# Patient Record
Sex: FEMALE | Race: BLACK OR AFRICAN AMERICAN | NOT HISPANIC OR LATINO | URBAN - METROPOLITAN AREA
[De-identification: names, ages, dates, MRNs, and addresses within clinical notes are randomized per-mention and may not be internally consistent; named-entity substitution may affect disease eponyms.]

---

## 2017-07-17 ENCOUNTER — INPATIENT (INPATIENT)
Facility: HOSPITAL | Age: 82
LOS: 4 days | Discharge: HOME | End: 2017-07-22
Attending: INTERNAL MEDICINE | Admitting: INTERNAL MEDICINE
Payer: MEDICARE

## 2017-07-17 VITALS
SYSTOLIC BLOOD PRESSURE: 148 MMHG | TEMPERATURE: 98 F | HEIGHT: 62 IN | HEART RATE: 50 BPM | OXYGEN SATURATION: 99 % | WEIGHT: 175.93 LBS | DIASTOLIC BLOOD PRESSURE: 64 MMHG | RESPIRATION RATE: 18 BRPM

## 2017-07-17 DIAGNOSIS — Z90.49 ACQUIRED ABSENCE OF OTHER SPECIFIED PARTS OF DIGESTIVE TRACT: Chronic | ICD-10-CM

## 2017-07-17 DIAGNOSIS — Z85.038 PERSONAL HISTORY OF OTHER MALIGNANT NEOPLASM OF LARGE INTESTINE: ICD-10-CM

## 2017-07-17 DIAGNOSIS — K56.60 UNSPECIFIED INTESTINAL OBSTRUCTION: ICD-10-CM

## 2017-07-17 DIAGNOSIS — I10 ESSENTIAL (PRIMARY) HYPERTENSION: ICD-10-CM

## 2017-07-17 DIAGNOSIS — R42 DIZZINESS AND GIDDINESS: ICD-10-CM

## 2017-07-17 DIAGNOSIS — Z90.49 ACQUIRED ABSENCE OF OTHER SPECIFIED PARTS OF DIGESTIVE TRACT: ICD-10-CM

## 2017-07-17 PROCEDURE — 99285 EMERGENCY DEPT VISIT HI MDM: CPT

## 2017-07-17 NOTE — ED ADULT TRIAGE NOTE - CHIEF COMPLAINT QUOTE
c/o dizziness x few days with generalized abdominal pain today + nausea denies vomiting or diarrhea states "room spinning sensation" worse upon supine position denies fever/chills denies headache

## 2017-07-18 DIAGNOSIS — N39.0 URINARY TRACT INFECTION, SITE NOT SPECIFIED: ICD-10-CM

## 2017-07-18 DIAGNOSIS — K56.69 OTHER INTESTINAL OBSTRUCTION: ICD-10-CM

## 2017-07-18 DIAGNOSIS — R42 DIZZINESS AND GIDDINESS: ICD-10-CM

## 2017-07-18 DIAGNOSIS — R74.8 ABNORMAL LEVELS OF OTHER SERUM ENZYMES: ICD-10-CM

## 2017-07-18 DIAGNOSIS — Z90.49 ACQUIRED ABSENCE OF OTHER SPECIFIED PARTS OF DIGESTIVE TRACT: Chronic | ICD-10-CM

## 2017-07-18 DIAGNOSIS — I10 ESSENTIAL (PRIMARY) HYPERTENSION: ICD-10-CM

## 2017-07-18 LAB
ALBUMIN SERPL ELPH-MCNC: 3.7 G/DL — SIGNIFICANT CHANGE UP (ref 3.3–5)
ALP SERPL-CCNC: 65 U/L — SIGNIFICANT CHANGE UP (ref 40–120)
ALT FLD-CCNC: 24 U/L — SIGNIFICANT CHANGE UP (ref 12–78)
ANION GAP SERPL CALC-SCNC: 7 MMOL/L — SIGNIFICANT CHANGE UP (ref 5–17)
ANION GAP SERPL CALC-SCNC: 7 MMOL/L — SIGNIFICANT CHANGE UP (ref 5–17)
APPEARANCE UR: CLEAR — SIGNIFICANT CHANGE UP
AST SERPL-CCNC: 13 U/L — LOW (ref 15–37)
BACTERIA # UR AUTO: ABNORMAL
BASOPHILS # BLD AUTO: 0 K/UL — SIGNIFICANT CHANGE UP (ref 0–0.2)
BASOPHILS # BLD AUTO: 0.1 K/UL — SIGNIFICANT CHANGE UP (ref 0–0.2)
BASOPHILS NFR BLD AUTO: 0.5 % — SIGNIFICANT CHANGE UP (ref 0–2)
BASOPHILS NFR BLD AUTO: 1.5 % — SIGNIFICANT CHANGE UP (ref 0–2)
BILIRUB SERPL-MCNC: 0.8 MG/DL — SIGNIFICANT CHANGE UP (ref 0.2–1.2)
BILIRUB UR-MCNC: NEGATIVE — SIGNIFICANT CHANGE UP
BUN SERPL-MCNC: 14 MG/DL — SIGNIFICANT CHANGE UP (ref 7–23)
BUN SERPL-MCNC: 17 MG/DL — SIGNIFICANT CHANGE UP (ref 7–23)
CALCIUM SERPL-MCNC: 10 MG/DL — SIGNIFICANT CHANGE UP (ref 8.5–10.1)
CALCIUM SERPL-MCNC: 9.8 MG/DL — SIGNIFICANT CHANGE UP (ref 8.5–10.1)
CHLORIDE SERPL-SCNC: 110 MMOL/L — HIGH (ref 96–108)
CHLORIDE SERPL-SCNC: 111 MMOL/L — HIGH (ref 96–108)
CHOLEST SERPL-MCNC: 177 MG/DL — SIGNIFICANT CHANGE UP (ref 10–199)
CK SERPL-CCNC: 133 U/L — SIGNIFICANT CHANGE UP (ref 26–192)
CO2 SERPL-SCNC: 26 MMOL/L — SIGNIFICANT CHANGE UP (ref 22–31)
CO2 SERPL-SCNC: 27 MMOL/L — SIGNIFICANT CHANGE UP (ref 22–31)
COLOR SPEC: YELLOW — SIGNIFICANT CHANGE UP
CREAT SERPL-MCNC: 1.17 MG/DL — SIGNIFICANT CHANGE UP (ref 0.5–1.3)
CREAT SERPL-MCNC: 1.29 MG/DL — SIGNIFICANT CHANGE UP (ref 0.5–1.3)
DIFF PNL FLD: ABNORMAL
EOSINOPHIL # BLD AUTO: 0 K/UL — SIGNIFICANT CHANGE UP (ref 0–0.5)
EOSINOPHIL # BLD AUTO: 0.1 K/UL — SIGNIFICANT CHANGE UP (ref 0–0.5)
EOSINOPHIL NFR BLD AUTO: 0.7 % — SIGNIFICANT CHANGE UP (ref 0–6)
EOSINOPHIL NFR BLD AUTO: 0.9 % — SIGNIFICANT CHANGE UP (ref 0–6)
GLUCOSE SERPL-MCNC: 110 MG/DL — HIGH (ref 70–99)
GLUCOSE SERPL-MCNC: 129 MG/DL — HIGH (ref 70–99)
GLUCOSE UR QL: NEGATIVE MG/DL — SIGNIFICANT CHANGE UP
HCT VFR BLD CALC: 34.4 % — LOW (ref 34.5–45)
HCT VFR BLD CALC: 36 % — SIGNIFICANT CHANGE UP (ref 34.5–45)
HDLC SERPL-MCNC: 31 MG/DL — LOW (ref 40–125)
HGB BLD-MCNC: 12.6 G/DL — SIGNIFICANT CHANGE UP (ref 11.5–15.5)
HGB BLD-MCNC: 12.7 G/DL — SIGNIFICANT CHANGE UP (ref 11.5–15.5)
KETONES UR-MCNC: NEGATIVE — SIGNIFICANT CHANGE UP
LACTATE SERPL-SCNC: 0.9 MMOL/L — SIGNIFICANT CHANGE UP (ref 0.7–2)
LEUKOCYTE ESTERASE UR-ACNC: ABNORMAL
LIDOCAIN IGE QN: 122 U/L — SIGNIFICANT CHANGE UP (ref 73–393)
LIPID PNL WITH DIRECT LDL SERPL: 109 MG/DL — SIGNIFICANT CHANGE UP
LYMPHOCYTES # BLD AUTO: 1.2 K/UL — SIGNIFICANT CHANGE UP (ref 1–3.3)
LYMPHOCYTES # BLD AUTO: 1.3 K/UL — SIGNIFICANT CHANGE UP (ref 1–3.3)
LYMPHOCYTES # BLD AUTO: 16.3 % — SIGNIFICANT CHANGE UP (ref 13–44)
LYMPHOCYTES # BLD AUTO: 19.9 % — SIGNIFICANT CHANGE UP (ref 13–44)
MCHC RBC-ENTMCNC: 31.3 PG — SIGNIFICANT CHANGE UP (ref 27–34)
MCHC RBC-ENTMCNC: 32.4 PG — SIGNIFICANT CHANGE UP (ref 27–34)
MCHC RBC-ENTMCNC: 35.2 GM/DL — SIGNIFICANT CHANGE UP (ref 32–36)
MCHC RBC-ENTMCNC: 36.6 GM/DL — HIGH (ref 32–36)
MCV RBC AUTO: 88.5 FL — SIGNIFICANT CHANGE UP (ref 80–100)
MCV RBC AUTO: 88.9 FL — SIGNIFICANT CHANGE UP (ref 80–100)
MONOCYTES # BLD AUTO: 0.4 K/UL — SIGNIFICANT CHANGE UP (ref 0–0.9)
MONOCYTES # BLD AUTO: 0.5 K/UL — SIGNIFICANT CHANGE UP (ref 0–0.9)
MONOCYTES NFR BLD AUTO: 6.2 % — SIGNIFICANT CHANGE UP (ref 2–14)
MONOCYTES NFR BLD AUTO: 7.7 % — SIGNIFICANT CHANGE UP (ref 2–14)
NEUTROPHILS # BLD AUTO: 4.5 K/UL — SIGNIFICANT CHANGE UP (ref 1.8–7.4)
NEUTROPHILS # BLD AUTO: 5.5 K/UL — SIGNIFICANT CHANGE UP (ref 1.8–7.4)
NEUTROPHILS NFR BLD AUTO: 70.2 % — SIGNIFICANT CHANGE UP (ref 43–77)
NEUTROPHILS NFR BLD AUTO: 76 % — SIGNIFICANT CHANGE UP (ref 43–77)
NITRITE UR-MCNC: NEGATIVE — SIGNIFICANT CHANGE UP
PH UR: 6 — SIGNIFICANT CHANGE UP (ref 5–8)
PLATELET # BLD AUTO: 289 K/UL — SIGNIFICANT CHANGE UP (ref 150–400)
PLATELET # BLD AUTO: 292 K/UL — SIGNIFICANT CHANGE UP (ref 150–400)
POTASSIUM SERPL-MCNC: 4.3 MMOL/L — SIGNIFICANT CHANGE UP (ref 3.5–5.3)
POTASSIUM SERPL-MCNC: 4.7 MMOL/L — SIGNIFICANT CHANGE UP (ref 3.5–5.3)
POTASSIUM SERPL-SCNC: 4.3 MMOL/L — SIGNIFICANT CHANGE UP (ref 3.5–5.3)
POTASSIUM SERPL-SCNC: 4.7 MMOL/L — SIGNIFICANT CHANGE UP (ref 3.5–5.3)
PROT SERPL-MCNC: 7.5 GM/DL — SIGNIFICANT CHANGE UP (ref 6–8.3)
PROT UR-MCNC: 30 MG/DL
RBC # BLD: 3.89 M/UL — SIGNIFICANT CHANGE UP (ref 3.8–5.2)
RBC # BLD: 4.05 M/UL — SIGNIFICANT CHANGE UP (ref 3.8–5.2)
RBC # FLD: 13 % — SIGNIFICANT CHANGE UP (ref 11–15)
RBC # FLD: 13 % — SIGNIFICANT CHANGE UP (ref 11–15)
RBC CASTS # UR COMP ASSIST: SIGNIFICANT CHANGE UP /HPF (ref 0–4)
SODIUM SERPL-SCNC: 144 MMOL/L — SIGNIFICANT CHANGE UP (ref 135–145)
SODIUM SERPL-SCNC: 144 MMOL/L — SIGNIFICANT CHANGE UP (ref 135–145)
SP GR SPEC: 1.01 — SIGNIFICANT CHANGE UP (ref 1.01–1.02)
T3 SERPL-MCNC: 104 NG/DL — SIGNIFICANT CHANGE UP (ref 80–200)
T4 AB SER-ACNC: 6.6 UG/DL — SIGNIFICANT CHANGE UP (ref 4.6–12)
TOTAL CHOLESTEROL/HDL RATIO MEASUREMENT: 5.7 RATIO — SIGNIFICANT CHANGE UP (ref 3.3–7.1)
TRIGL SERPL-MCNC: 185 MG/DL — HIGH (ref 10–149)
TROPONIN I SERPL-MCNC: 0.09 NG/ML — HIGH (ref 0.01–0.04)
TROPONIN I SERPL-MCNC: 0.1 NG/ML — HIGH (ref 0.01–0.04)
TROPONIN I SERPL-MCNC: 0.1 NG/ML — HIGH (ref 0.01–0.04)
TSH SERPL-MCNC: 2.24 UU/ML — SIGNIFICANT CHANGE UP (ref 0.36–3.74)
UROBILINOGEN FLD QL: NEGATIVE MG/DL — SIGNIFICANT CHANGE UP
WBC # BLD: 6.5 K/UL — SIGNIFICANT CHANGE UP (ref 3.8–10.5)
WBC # BLD: 7.2 K/UL — SIGNIFICANT CHANGE UP (ref 3.8–10.5)
WBC # FLD AUTO: 6.5 K/UL — SIGNIFICANT CHANGE UP (ref 3.8–10.5)
WBC # FLD AUTO: 7.2 K/UL — SIGNIFICANT CHANGE UP (ref 3.8–10.5)
WBC UR QL: ABNORMAL

## 2017-07-18 PROCEDURE — 71010: CPT | Mod: 26,77

## 2017-07-18 PROCEDURE — 70450 CT HEAD/BRAIN W/O DYE: CPT | Mod: 26

## 2017-07-18 PROCEDURE — 93010 ELECTROCARDIOGRAM REPORT: CPT

## 2017-07-18 PROCEDURE — 99223 1ST HOSP IP/OBS HIGH 75: CPT

## 2017-07-18 PROCEDURE — 43752 NASAL/OROGASTRIC W/TUBE PLMT: CPT

## 2017-07-18 PROCEDURE — 71010: CPT | Mod: 26

## 2017-07-18 PROCEDURE — 74176 CT ABD & PELVIS W/O CONTRAST: CPT | Mod: 26

## 2017-07-18 RX ORDER — SODIUM CHLORIDE 9 MG/ML
1000 INJECTION INTRAMUSCULAR; INTRAVENOUS; SUBCUTANEOUS ONCE
Qty: 0 | Refills: 0 | Status: COMPLETED | OUTPATIENT
Start: 2017-07-18 | End: 2017-07-18

## 2017-07-18 RX ORDER — MORPHINE SULFATE 50 MG/1
2 CAPSULE, EXTENDED RELEASE ORAL EVERY 6 HOURS
Qty: 0 | Refills: 0 | Status: DISCONTINUED | OUTPATIENT
Start: 2017-07-18 | End: 2017-07-22

## 2017-07-18 RX ORDER — ONDANSETRON 8 MG/1
4 TABLET, FILM COATED ORAL EVERY 6 HOURS
Qty: 0 | Refills: 0 | Status: DISCONTINUED | OUTPATIENT
Start: 2017-07-18 | End: 2017-07-22

## 2017-07-18 RX ORDER — AMLODIPINE BESYLATE 2.5 MG/1
10 TABLET ORAL DAILY
Qty: 0 | Refills: 0 | Status: DISCONTINUED | OUTPATIENT
Start: 2017-07-18 | End: 2017-07-22

## 2017-07-18 RX ORDER — MECLIZINE HCL 12.5 MG
25 TABLET ORAL ONCE
Qty: 0 | Refills: 0 | Status: COMPLETED | OUTPATIENT
Start: 2017-07-18 | End: 2017-07-18

## 2017-07-18 RX ORDER — SODIUM CHLORIDE 9 MG/ML
1000 INJECTION INTRAMUSCULAR; INTRAVENOUS; SUBCUTANEOUS
Qty: 0 | Refills: 0 | Status: DISCONTINUED | OUTPATIENT
Start: 2017-07-18 | End: 2017-07-22

## 2017-07-18 RX ORDER — HEPARIN SODIUM 5000 [USP'U]/ML
5000 INJECTION INTRAVENOUS; SUBCUTANEOUS EVERY 12 HOURS
Qty: 0 | Refills: 0 | Status: DISCONTINUED | OUTPATIENT
Start: 2017-07-18 | End: 2017-07-22

## 2017-07-18 RX ORDER — CEFTRIAXONE 500 MG/1
1 INJECTION, POWDER, FOR SOLUTION INTRAMUSCULAR; INTRAVENOUS ONCE
Qty: 0 | Refills: 0 | Status: COMPLETED | OUTPATIENT
Start: 2017-07-18 | End: 2017-07-18

## 2017-07-18 RX ORDER — PIPERACILLIN AND TAZOBACTAM 4; .5 G/20ML; G/20ML
3.38 INJECTION, POWDER, LYOPHILIZED, FOR SOLUTION INTRAVENOUS ONCE
Qty: 0 | Refills: 0 | Status: COMPLETED | OUTPATIENT
Start: 2017-07-18 | End: 2017-07-18

## 2017-07-18 RX ORDER — CEFTRIAXONE 500 MG/1
1 INJECTION, POWDER, FOR SOLUTION INTRAMUSCULAR; INTRAVENOUS EVERY 24 HOURS
Qty: 0 | Refills: 0 | Status: DISCONTINUED | OUTPATIENT
Start: 2017-07-19 | End: 2017-07-18

## 2017-07-18 RX ORDER — ASPIRIN/CALCIUM CARB/MAGNESIUM 324 MG
325 TABLET ORAL ONCE
Qty: 0 | Refills: 0 | Status: COMPLETED | OUTPATIENT
Start: 2017-07-18 | End: 2017-07-18

## 2017-07-18 RX ORDER — ATENOLOL 25 MG/1
25 TABLET ORAL DAILY
Qty: 0 | Refills: 0 | Status: DISCONTINUED | OUTPATIENT
Start: 2017-07-18 | End: 2017-07-22

## 2017-07-18 RX ORDER — LISINOPRIL 2.5 MG/1
20 TABLET ORAL DAILY
Qty: 0 | Refills: 0 | Status: DISCONTINUED | OUTPATIENT
Start: 2017-07-18 | End: 2017-07-22

## 2017-07-18 RX ORDER — PIPERACILLIN AND TAZOBACTAM 4; .5 G/20ML; G/20ML
3.38 INJECTION, POWDER, LYOPHILIZED, FOR SOLUTION INTRAVENOUS EVERY 8 HOURS
Qty: 0 | Refills: 0 | Status: DISCONTINUED | OUTPATIENT
Start: 2017-07-18 | End: 2017-07-18

## 2017-07-18 RX ADMIN — Medication 25 MILLIGRAM(S): at 02:10

## 2017-07-18 RX ADMIN — MORPHINE SULFATE 2 MILLIGRAM(S): 50 CAPSULE, EXTENDED RELEASE ORAL at 23:18

## 2017-07-18 RX ADMIN — SODIUM CHLORIDE 100 MILLILITER(S): 9 INJECTION INTRAMUSCULAR; INTRAVENOUS; SUBCUTANEOUS at 08:54

## 2017-07-18 RX ADMIN — MORPHINE SULFATE 2 MILLIGRAM(S): 50 CAPSULE, EXTENDED RELEASE ORAL at 09:46

## 2017-07-18 RX ADMIN — ATENOLOL 25 MILLIGRAM(S): 25 TABLET ORAL at 09:41

## 2017-07-18 RX ADMIN — CEFTRIAXONE 100 GRAM(S): 500 INJECTION, POWDER, FOR SOLUTION INTRAMUSCULAR; INTRAVENOUS at 04:03

## 2017-07-18 RX ADMIN — ONDANSETRON 4 MILLIGRAM(S): 8 TABLET, FILM COATED ORAL at 09:57

## 2017-07-18 RX ADMIN — SODIUM CHLORIDE 500 MILLILITER(S): 9 INJECTION INTRAMUSCULAR; INTRAVENOUS; SUBCUTANEOUS at 02:09

## 2017-07-18 RX ADMIN — MORPHINE SULFATE 2 MILLIGRAM(S): 50 CAPSULE, EXTENDED RELEASE ORAL at 10:14

## 2017-07-18 RX ADMIN — PIPERACILLIN AND TAZOBACTAM 200 GRAM(S): 4; .5 INJECTION, POWDER, LYOPHILIZED, FOR SOLUTION INTRAVENOUS at 08:39

## 2017-07-18 RX ADMIN — Medication 325 MILLIGRAM(S): at 04:29

## 2017-07-18 NOTE — ED PROVIDER NOTE - PHYSICAL EXAMINATION
GEN: Alert, NAD  HEAD: atraumatic, EOMI, PERRL, normal lids/conjunctiva  ENT: normal hearing, patent oropharynx without erythema/exudate, uvula midline  NECK: +supple, no tenderness/meningismus, +Trachea midline  PULM: Bilateral BS, normal resp effort, no wheeze/stridor/retractions  CV: RRR, no M/R/G, +dist ext pulses  ABD: soft, NT/ND  BACK: no CVAT, no midline tenderness  MSK: no edema/erythema/cyanosis  SKIN: no rash  NEURO: AAOx3, no sensory/motor deficits, CN 2-12 intact  neg pepper mcnair darlenee b/l  sotero nagel b/l

## 2017-07-18 NOTE — H&P ADULT - ASSESSMENT
82 y/o woman with history colon resection for Ca, presents with diffuse abdominal pain with abdominal distention; no nausea or vomiting, she has been passing stools. CT shows at least partial  SBO. She had an episode of vertigo, head CT shows mastoid effusion. Also noted is a UTI and elevated troponin. She denies chest pain or SOB.

## 2017-07-18 NOTE — H&P ADULT - NSHPPHYSICALEXAM_GEN_ALL_CORE
T(C): 36.7 (18 Jul 2017 04:50), Max: 36.7 (17 Jul 2017 22:00)  T(F): 98 (18 Jul 2017 04:50), Max: 98.1 (17 Jul 2017 22:00)  HR: 77 (18 Jul 2017 04:50) (50 - 77)  BP: 148/69 (18 Jul 2017 04:50) (148/64 - 154/77)  BP(mean): --  RR: 17 (18 Jul 2017 04:50) (17 - 20)  SpO2: 99% (18 Jul 2017 04:50) (97% - 99%)     PHYSICAL EXAM:  GENERAL: NAD, well-groomed, well-developed  HEAD:  Atraumatic, Normocephalic  EYES: EOMI, PERRLA, conjunctiva and sclera clear  ENMT: No tonsillar erythema, exudates, or enlargement; No lesions  NECK: Supple, No JVD  NERVOUS SYSTEM:  Alert & Oriented X3, CN 2-12 intact; Motor Strength 5/5 B/L upper and lower extremities; DTRs 2+ intact and symmetric  CHEST/LUNG: Clear to percussion bilaterally; No rales, rhonchi, wheezing, or rubs  HEART: Regular rate and rhythm; No murmurs, rubs, or gallops  ABDOMEN: Soft, Nontender, no rebound or guarding, mildly distended; Bowel sounds present  EXTREMITIES: + Peripheral Pulses, No clubbing, cyanosis, or edema  LYMPH: No lymphadenopathy noted  SKIN: No rashes or lesions

## 2017-07-18 NOTE — ED PROVIDER NOTE - CARE PLAN
Principal Discharge DX:	NSTEMI (non-ST elevated myocardial infarction)  Secondary Diagnosis:	Urinary tract infection without hematuria, site unspecified  Secondary Diagnosis:	Abdominal pain, unspecified location

## 2017-07-18 NOTE — PROGRESS NOTE ADULT - SUBJECTIVE AND OBJECTIVE BOX
Patient is a 81y old  Female who presents with a chief complaint of Abdominal pain for several days. (2017 06:41)      INTERVAL HPI/OVERNIGHT EVENTS: admitted in AM, had some ep of vomiting     MEDICATIONS  (STANDING):  ATENolol  Tablet 25 milliGRAM(s) Oral daily  heparin  Injectable 5000 Unit(s) SubCutaneous every 12 hours  sodium chloride 0.9%. 1000 milliLiter(s) (100 mL/Hr) IV Continuous <Continuous>  amLODIPine   Tablet 10 milliGRAM(s) Oral daily  lisinopril 20 milliGRAM(s) Oral daily  cefTRIAXone   IVPB 1 Gram(s) IV Intermittent every 24 hours    MEDICATIONS  (PRN):  morphine  - Injectable 2 milliGRAM(s) IV Push every 6 hours PRN Severe Pain (7 - 10)  ondansetron Injectable 4 milliGRAM(s) IV Push every 6 hours PRN Nausea and/or Vomiting      Allergies    No Known Allergies    Intolerances      REVIEW OF SYSTEMS:  CONSTITUTIONAL: No fever, weight loss, or fatigue  EYES: No eye pain, visual disturbances, or discharge  ENMT:  No difficulty hearing, + vertigo; No sinus or throat pain  NECK: No pain or stiffness  BREASTS: No pain, masses, or nipple discharge  RESPIRATORY: No cough, wheezing, chills or hemoptysis; No shortness of breath  CARDIOVASCULAR: No chest pain, palpitations, dizziness, or leg swelling  GASTROINTESTINAL: + abdominal  pain. No nausea, vomiting, or hematemesis; No diarrhea or constipation. No melena or hematochezia.  GENITOURINARY: No dysuria, frequency, hematuria, or incontinence  NEUROLOGICAL: No headaches, memory loss, loss of strength, numbness, or tremors  SKIN: No itching, burning, rashes, or lesions   LYMPH NODES: No enlarged glands  ENDOCRINE: No heat or cold intolerance; No hair loss  MUSCULOSKELETAL: No joint pain or swelling; No muscle, back, or extremity pain  PSYCHIATRIC: No depression, anxiety, mood swings, or difficulty sleeping  HEME/LYMPH: No easy bruising, or bleeding gums  ALLERGY AND IMMUNOLOGIC: No hives or eczema    Vital Signs Last 24 Hrs  T(C): 36.9 (2017 09:35), Max: 36.9 (2017 09:35)  T(F): 98.5 (2017 09:35), Max: 98.5 (2017 09:35)  HR: 56 (2017 11:08) (50 - 77)  BP: 152/55 (2017 11:08) (148/64 - 182/69)  BP(mean): --  RR: 18 (2017 11:08) (15 - 20)  SpO2: 97% (2017 11:08) (96% - 99%)    PHYSICAL EXAM:  GENERAL: NAD, well-groomed, well-developed  HEAD:  Atraumatic, Normocephalic  EYES: EOMI, PERRLA, conjunctiva and sclera clear  ENMT: No tonsillar erythema, exudates, or enlargement; No lesions  NECK: Supple, No JVD  NERVOUS SYSTEM:  Alert & Oriented X3, CN 2-12 intact; Motor Strength 5/5 B/L upper and lower extremities; DTRs 2+ intact and symmetric  CHEST/LUNG: Clear to percussion bilaterally; No rales, rhonchi, wheezing, or rubs  HEART: Regular rate and rhythm; No murmurs, rubs, or gallops  ABDOMEN: Soft, Nontender, no rebound or guarding, mildly distended; Bowel sounds present  EXTREMITIES: + Peripheral Pulses, No clubbing, cyanosis, or edema  LYMPH: No lymphadenopathy noted  SKIN: No rashes or lesions    LABS:                        12.7   6.5   )-----------( 289      ( 2017 09:47 )             36.0     07-18    144  |  111<H>  |  14  ----------------------------<  110<H>  4.3   |  26  |  1.17    Ca    9.8      2017 09:47    TPro  7.5  /  Alb  3.7  /  TBili  0.8  /  DBili  x   /  AST  13<L>  /  ALT  24  /  AlkPhos  65  07-18      Urinalysis Basic - ( 2017 02:20 )    Color: Yellow / Appearance: Clear / S.010 / pH: x  Gluc: x / Ketone: Negative  / Bili: Negative / Urobili: Negative mg/dL   Blood: x / Protein: 30 mg/dL / Nitrite: Negative   Leuk Esterase: Moderate / RBC: 0-2 /HPF / WBC 11-25   Sq Epi: x / Non Sq Epi: x / Bacteria: x      CAPILLARY BLOOD GLUCOSE          RADIOLOGY & ADDITIONAL TESTS:    Imaging Personally Reviewed:  [ ] YES  [ ] NO    Consultant(s) Notes Reviewed:  [ ] YES  [ ] NO    Care Discussed with Consultants/Other Providers [ ] YES  [ ] NO

## 2017-07-18 NOTE — H&P ADULT - NSHPLABSRESULTS_GEN_ALL_CORE
LABS:                        12.6   7.2   )-----------( 292      ( 2017 02:09 )             34.4     -    144  |  110<H>  |  17  ----------------------------<  129<H>  4.7   |  27  |  1.29    Ca    10.0      2017 02:09    TPro  7.5  /  Alb  3.7  /  TBili  0.8  /  DBili  x   /  AST  13<L>  /  ALT  24  /  AlkPhos  65        Urinalysis Basic - ( 2017 02:20 )    Color: Yellow / Appearance: Clear / S.010 / pH: x  Gluc: x / Ketone: Negative  / Bili: Negative / Urobili: Negative mg/dL   Blood: x / Protein: 30 mg/dL / Nitrite: Negative   Leuk Esterase: Moderate / RBC: 0-2 /HPF / WBC 11-25   Sq Epi: x / Non Sq Epi: x / Bacteria: x      CAPILLARY BLOOD GLUCOSE      RADIOLOGY & ADDITIONAL TESTS:   CT Abdomen and Pelvis No Cont (17 @ 04:02) >  Small bowel obstruction, at least partial, transition point right lower   quadrant. No secondary signs of ischemia at this time.     CT Head No Cont (17 @ 04:01) >  No acute intracranial hemorrhage, mass effect or evidence of acute   territorial infarct. Mild to moderate chronic microvascular change.  There is a right mastoid effusion.    CXR: no infiltrate      Imaging Personally Reviewed:  [ x] YES  [ ] NO    EKG: sinus@85 LVH

## 2017-07-18 NOTE — H&P ADULT - NSHPREVIEWOFSYSTEMS_GEN_ALL_CORE
REVIEW OF SYSTEMS:  CONSTITUTIONAL: No fever, weight loss, or fatigue  EYES: No eye pain, visual disturbances, or discharge  ENMT:  No difficulty hearing, + vertigo; No sinus or throat pain  NECK: No pain or stiffness  BREASTS: No pain, masses, or nipple discharge  RESPIRATORY: No cough, wheezing, chills or hemoptysis; No shortness of breath  CARDIOVASCULAR: No chest pain, palpitations, dizziness, or leg swelling  GASTROINTESTINAL: + abdominal  pain. No nausea, vomiting, or hematemesis; No diarrhea or constipation. No melena or hematochezia.  GENITOURINARY: No dysuria, frequency, hematuria, or incontinence  NEUROLOGICAL: No headaches, memory loss, loss of strength, numbness, or tremors  SKIN: No itching, burning, rashes, or lesions   LYMPH NODES: No enlarged glands  ENDOCRINE: No heat or cold intolerance; No hair loss  MUSCULOSKELETAL: No joint pain or swelling; No muscle, back, or extremity pain  PSYCHIATRIC: No depression, anxiety, mood swings, or difficulty sleeping  HEME/LYMPH: No easy bruising, or bleeding gums  ALLERGY AND IMMUNOLOGIC: No hives or eczema

## 2017-07-18 NOTE — CONSULT NOTE ADULT - PROBLEM SELECTOR RECOMMENDATION 3
Poorly controlled. Resume beta blocker, calcium channel blocker and ACE inhibitor. Continue to monitor.

## 2017-07-18 NOTE — ED PROVIDER NOTE - PRESENTING SYMPTOMS DETAILS
81F w colon ca s/p resection remotely, htn comes ni w gen abd danielle and lightheadedness. states abd danielle started today, feels bloating, no n/v/d. no weaknes/snumbness. also fenrández shad wks worth of room spinning sensation worse laying down. no headache, currently vertiginous sx resolved  ROS: No fever/chills, No photophobia/eye pain/changes in vision, No ear pain/sore throat/dysphagia, No chest pain/palpitations, no SOB/cough/wheeze/stridor, No N/V/D, no dysuria/frequency/discharge, No neck/back pain, no rash, no changes in neurological status/function.

## 2017-07-18 NOTE — ED PROVIDER NOTE - MEDICAL DECISION MAKING DETAILS
ct shows partial sbo although clnically abd exam benign, no n/v/d, passing flatus. surg consulted. pt admitted for nstemi, sbo

## 2017-07-18 NOTE — CONSULT NOTE ADULT - SUBJECTIVE AND OBJECTIVE BOX
Dr. Quentin Nava contact information [phone]    GENERAL SURGERY CONSULT NOTE    Patient is a 81y old  Female who presents with a chief complaint of Abdominal pain for several days. (2017 06:41)      HPI:  80 y/o woman PMH colon ca s/p resection remotely, HTN comes in with generalized abdominal pain and lightheadedness.  She states has had abd pain on and off for several days, crampy, diffuse, mod to mild intensity, feels bloating, but no  n/v/d; she has been passing stools. Denies chest pain, SOB, cough, weakness/numbness. Denies headache, had episode vertiginous sx which has now resolved.  No fever/chills, No photophobia/eye pain/changes in vision, No ear pain/sore throat/dysphagia, No chest pain/palpitations, no SOB/cough/wheeze/stridor, No N/V/D, no dysuria/frequency/discharge, No neck/back pain, no rash, no changes in neurological status/function. (2017 06:41)      PAST MEDICAL & SURGICAL HISTORY:  Colon cancer  HTN (hypertension)  History of cholecystectomy  S/P colon resection      Review of Systems:    I have reviewed 9 systems with the patient and the only positive findings were     MEDICATIONS  (STANDING):  ATENolol  Tablet 25 milliGRAM(s) Oral daily  heparin  Injectable 5000 Unit(s) SubCutaneous every 12 hours  sodium chloride 0.9%. 1000 milliLiter(s) (100 mL/Hr) IV Continuous <Continuous>  piperacillin/tazobactam IVPB. 3.375 Gram(s) IV Intermittent every 8 hours    MEDICATIONS  (PRN):  morphine  - Injectable 2 milliGRAM(s) IV Push every 6 hours PRN Severe Pain (7 - 10)  ondansetron Injectable 4 milliGRAM(s) IV Push every 6 hours PRN Nausea and/or Vomiting      Allergies    No Known Allergies    Intolerances      FAMILY HISTORY:  No pertinent family history in first degree relatives      Vital Signs Last 24 Hrs  T(C): 36.8 (2017 07:50), Max: 36.8 (2017 07:50)  T(F): 98.2 (2017 07:50), Max: 98.2 (2017 07:50)  HR: 57 (2017 08:45) (50 - 77)  BP: 169/74 (2017 08:45) (148/64 - 169/74)  BP(mean): --  RR: 16 (2017 08:45) (15 - 20)  SpO2: 96% (2017 08:45) (96% - 99%)    I&O's Detail    2017 07:01  -  2017 09:04  --------------------------------------------------------  IN:    Solution: 100 mL  Total IN: 100 mL    OUT:  Total OUT: 0 mL    Total NET: 100 mL          Physical Exam:    General:  Appears stated age, well-nourished, no distress - Creole speaking   Abdomen:  Softly disstended with mild tenderness    Neuro/Psych:  Alert, oriented to time, place and person       LABS:                        12.6   7.2   )-----------( 292      ( 2017 02:09 )             34.4     07-18    144  |  110<H>  |  17  ----------------------------<  129<H>  4.7   |  27  |  1.29    Ca    10.0      2017 02:09    TPro  7.5  /  Alb  3.7  /  TBili  0.8  /  DBili  x   /  AST  13<L>  /  ALT  24  /  AlkPhos  65  07-18      Urinalysis Basic - ( 2017 02:20 )    Color: Yellow / Appearance: Clear / S.010 / pH: x  Gluc: x / Ketone: Negative  / Bili: Negative / Urobili: Negative mg/dL   Blood: x / Protein: 30 mg/dL / Nitrite: Negative   Leuk Esterase: Moderate / RBC: 0-2 /HPF / WBC 11-25   Sq Epi: x / Non Sq Epi: x / Bacteria: x        RADIOLOGY & ADDITIONAL STUDIES:  Dr. Quentin Nava contact information [phone]    GENERAL SURGERY CONSULT NOTE    Patient is a 81y old  Female who presents with a chief complaint of Abdominal pain for several days. (2017 06:41)      HPI:  80 y/o woman PMH colon ca s/p resection remotely, HTN comes in with generalized abdominal pain and lightheadedness.  She states has had abd pain on and off for several days, crampy, diffuse, mod to mild intensity, feels bloating, but no  n/v/d; she has been passing stools. Denies chest pain, SOB, cough, weakness/numbness. Denies headache, had episode vertiginous sx which has now resolved.  No fever/chills, No photophobia/eye pain/changes in vision, No ear pain/sore throat/dysphagia, No chest pain/palpitations, no SOB/cough/wheeze/stridor, No N/V/D, no dysuria/frequency/discharge, No neck/back pain, no rash, no changes in neurological status/function. (2017 06:41)      PAST MEDICAL & SURGICAL HISTORY:  Colon cancer  HTN (hypertension)  History of cholecystectomy  S/P colon resection      Review of Systems:    I have reviewed 9 systems with the patient and the only positive findings were     MEDICATIONS  (STANDING):  ATENolol  Tablet 25 milliGRAM(s) Oral daily  heparin  Injectable 5000 Unit(s) SubCutaneous every 12 hours  sodium chloride 0.9%. 1000 milliLiter(s) (100 mL/Hr) IV Continuous <Continuous>  piperacillin/tazobactam IVPB. 3.375 Gram(s) IV Intermittent every 8 hours    MEDICATIONS  (PRN):  morphine  - Injectable 2 milliGRAM(s) IV Push every 6 hours PRN Severe Pain (7 - 10)  ondansetron Injectable 4 milliGRAM(s) IV Push every 6 hours PRN Nausea and/or Vomiting      Allergies    No Known Allergies    Intolerances        SOCIAL HISTORY          Smoking: Yes [ ]  No [ ]   ______pk yrs          ETOH  Yes [ ]  No [ ]  Social [ ]          DRUGS:  Yes [ ]  No [ ]  if so what______________    FAMILY HISTORY:  No pertinent family history in first degree relatives      Vital Signs Last 24 Hrs  T(C): 36.8 (2017 07:50), Max: 36.8 (2017 07:50)  T(F): 98.2 (2017 07:50), Max: 98.2 (2017 07:50)  HR: 57 (2017 08:45) (50 - 77)  BP: 169/74 (2017 08:45) (148/64 - 169/74)  BP(mean): --  RR: 16 (2017 08:45) (15 - 20)  SpO2: 96% (2017 08:45) (96% - 99%)    I&O's Detail    2017 07:01  -  2017 09:04  --------------------------------------------------------  IN:    Solution: 100 mL  Total IN: 100 mL    OUT:  Total OUT: 0 mL    Total NET: 100 mL          Physical Exam:    General:  Appears stated age, well-groomed, well-nourished, no distress - Creole speaking    Abdomen:  softly distended - with mild tenderness but no peritoneal findings    Neuro/Psych:  Alert, oriented tp time, place and person       LABS:                        12.6   7.2   )-----------( 292      ( 2017 02:09 )             34.4     07-18    144  |  110<H>  |  17  ----------------------------<  129<H>  4.7   |  27  |  1.29    Ca    10.0      2017 02:09    TPro  7.5  /  Alb  3.7  /  TBili  0.8  /  DBili  x   /  AST  13<L>  /  ALT  24  /  AlkPhos  65  07-18      Urinalysis Basic - ( 2017 02:20 )    Color: Yellow / Appearance: Clear / S.010 / pH: x  Gluc: x / Ketone: Negative  / Bili: Negative / Urobili: Negative mg/dL   Blood: x / Protein: 30 mg/dL / Nitrite: Negative   Leuk Esterase: Moderate / RBC: 0-2 /HPF / WBC 11-25   Sq Epi: x / Non Sq Epi: x / Bacteria: x        RADIOLOGY & ADDITIONAL STUDIES:    < from: CT Abdomen and Pelvis No Cont (17 @ 04:02) >  IMPRESSION:    Small bowel obstruction, at least partial, transition point right lower   quadrant. No secondary signs of ischemia at this time.    < end of copied text >
Infectious Diseases - Attending at Dr. Little    HPI:  80 y/o woman PMH colon ca s/p resection remotely, HTN comes in with generalized abdominal pain and lightheadedness.  She states has had abd pain on and off for several days, crampy, diffuse, mod to mild intensity, feels bloating, but no  n/v/d; she has been passing stools. Denies chest pain, SOB, cough, weakness/numbness. Denies headache, had episode vertiginous sx which has now resolved.  No fever/chills, No photophobia/eye pain/changes in vision, No ear pain/sore throat/dysphagia, No chest pain/palpitations, no SOB/cough/wheeze/stridor, No N/V/D, no dysuria/frequency/discharge, No neck/back pain, no rash, no changes in neurological status/function. (2017 06:41)  Pt;s family creole speaking helped during conversation    PAST MEDICAL & SURGICAL HISTORY:  Colon cancer  HTN (hypertension)  History of cholecystectomy  S/P colon resection      Allergies    No Known Allergies    Intolerances        FAMILY HISTORY:  No pertinent family history in first degree relatives      SOCIAL HISTORYno smoking    REVIEW OF SYSTEMS:    Constitutional: No fever, weight loss or fatigue  Eyes: No eye pain, visual disturbances, or discharge  ENT:  No difficulty hearing, tinnitus, vertigo; No sinus or throat pain  Neck: No pain or stiffness  Respiratory: No cough, wheezing, chills or hemoptysis  Cardiovascular: No chest pain, palpitations, shortness of breath, dizziness or leg swelling  Gastrointestinal:+ abdominal pain and vomiting  Genitourinary: No dysuria, frequency, hematuria or incontinence  Neurological: No headaches, memory loss, loss of strength, numbness or tremors  Skin: No itching, burning, rashes or lesions       MEDICATIONS  (STANDING):  ATENolol  Tablet 25 milliGRAM(s) Oral daily  heparin  Injectable 5000 Unit(s) SubCutaneous every 12 hours  sodium chloride 0.9%. 1000 milliLiter(s) (100 mL/Hr) IV Continuous <Continuous>  amLODIPine   Tablet 10 milliGRAM(s) Oral daily  lisinopril 20 milliGRAM(s) Oral daily    MEDICATIONS  (PRN):  morphine  - Injectable 2 milliGRAM(s) IV Push every 6 hours PRN Severe Pain (7 - 10)  ondansetron Injectable 4 milliGRAM(s) IV Push every 6 hours PRN Nausea and/or Vomiting      Vital Signs Last 24 Hrs  Tmax:afebrile  HR: 62 (2017 05:27) (53 - 62)  BP: 147/69 (2017 05:27) (138/67 - 147/69)  BP(mean): --  RR: 18 (2017 05:27) (18 - 18)  SpO2: 93% (2017 05:27) (93% - 98%)    PHYSICAL EXAM:    Constitutional:appears lethargic, well-groomed, well-developed  HEENT: PERRLA, EOMI, Normal Hearing, MMM, NG tube present  Neck: No LAD, No JVD  Back: Normal spine flexure, No CVA tenderness  Respiratory: CTAB/L  Cardiovascular: S1 and S2, RRR, no M/G/R  Gastrointestinal: abdominal pain present+, NGT +  Extremities: No peripheral edema  Vascular: 2+ peripheral pulses  Neurological: A/O x 3, no focal deficits  Skin: No rashes      LABS:             WBC 6.5  cr 1.17  Trop 0.96-->0.96-->0.75      Urinalysis Basic - ( 2017 02:20 )    Color: Yellow / Appearance: Clear / S.010 / pH: x  Gluc: x / Ketone: Negative  / Bili: Negative / Urobili: Negative mg/dL   Blood: x / Protein: 30 mg/dL / Nitrite: Negative   Leuk Esterase: Moderate / RBC: 0-2 /HPF / WBC 11-25   Sq Epi: x / Non Sq Epi: x / Bacteria: x        MICROBIOLOGY:  RECENT CULTURES:        RADIOLOGY & ADDITIONAL STUDIES:  < from: CT Abdomen and Pelvis No Cont (17 @ 04:02) >  IMPRESSION:    Small bowel obstruction, at least partial, transition point right lower   quadrant. No secondary signs of ischemia at this time.    < end of copied text >
CARDIOLOGY CONSULT NOTE    Patient is a Creole speaking 81y Female with a stated history of colon ca s/p resection remotely, chronic HTN.   Admitted with c/o generalized abdominal pain on and off for several days, crampy, diffuse, mod to mild intensity, feels bloating, but no overt n/v/d, normal BM's. Denied chest pain, SOB, cough, weakness/numbness. Denied headache, but had single episode of lightheadedness which appears to have resolved. No fever/chills, No photophobia/eye pain/changes in vision, No ear pain/sore throat/dysphagia, No dysuria/frequency/discharge, No neck/back pain, no rash, no changes in neurological status/function.       REVIEW OF SYSTEMS: Not available, patient spoke poor English  MEDICATIONS  (STANDING):  ATENolol  Tablet 25 milliGRAM(s) Oral daily  Amlodipine-Benazapril      ALLERGIES: No Known Allergies      FAMILY HISTORY:  No pertinent family history in first degree relatives      PHYSICAL EXAMINATION:  -----------------------------  T(C): 36.9 (07-18-17 @ 09:35), Max: 36.9 (07-18-17 @ 09:35)  HR: 59 (07-18-17 @ 09:35) (50 - 77)  BP: 182/69 (07-18-17 @ 09:35) (148/64 - 182/69)  RR: 16 (07-18-17 @ 09:35) (15 - 20)  SpO2: 97% (07-18-17 @ 09:35) (96% - 99%)  Wt(kg): --    07-18 @ 07:01  -  07-18 @ 09:39  --------------------------------------------------------  IN:    Solution: 100 mL  Total IN: 100 mL    OUT:  Total OUT: 0 mL    Total NET: 100 mL        Height (cm): 157.48 (07-17 @ 22:00)  Weight (kg): 79.8 (07-17 @ 22:00)  BMI (kg/m2): 32.2 (07-17 @ 22:00)  BSA (m2): 1.81 (07-17 @ 22:00)    Constitutional: well developed, normal appearance, well groomed, well nourished, no deformities and no acute distress.   Eyes: the conjunctiva exhibited no abnormalities and the eyelids demonstrated no xanthelasmas.   HEENT: normal oral mucosa, no oral pallor and no oral cyanosis.   Neck: normal jugular venous A waves present, normal jugular venous V waves present and no jugular venous sauer A waves.   Pulmonary: no respiratory distress, normal respiratory rhythm and effort, no accessory muscle use and lungs were clear to auscultation bilaterally.   Cardiovascular: heart rate and rhythm were normal, normal S1 and S2 and no murmur, gallop, rub, heave or thrill are present.   Abdomen: soft, non-tender, no hepato-splenomegaly and no abdominal mass palpated.   Musculoskeletal: the gait could not be assessed..   Extremities: no clubbing of the fingernails, no localized cyanosis, no petechial hemorrhages and no ischemic changes.   Skin: normal skin color and pigmentation, no rash, no venous stasis, no skin lesions, no skin ulcer and no xanthoma was observed.   Psychiatric: oriented to person, place, and time, the affect was normal, the mood was normal and not feeling anxious.     LABS:   --------  07-18    144  |  110<H>  |  17  ----------------------------<  129<H>  4.7   |  27  |  1.29    Ca    10.0      18 Jul 2017 02:09    TPro  7.5  /  Alb  3.7  /  TBili  0.8  /  DBili  x   /  AST  13<L>  /  ALT  24  /  AlkPhos  65  07-18                         12.6   7.2   )-----------( 292      ( 18 Jul 2017 02:09 )             34.4         07-18 @ 02:09 CPK total:--, CKMB --, Troponin I - .096 ng/mL<H>          RADIOLOGY:  -----------------    < from: Xray Chest 1 View AP/PA. (07.18.17 @ 02:41) >    EXAM:  CHEST SINGLE VIEW                            PROCEDURE DATE:  07/18/2017          INTERPRETATION:  PROCEDURE: AP view of the chest.    CLINICAL INFORMATION: Chest pain    There is no prior radiograph available for comparison at the time of this   report.    FINDINGS:        There are no focal air space opacities. The cardiac and mediastinal   contours are prominent, which may be due to magnification from AP   technique and shallow inspiration. The osseous structures are intact.    IMPRESSION:    No focal air space opacities.                  CANDI MERIDA M.D., ATTENDING RADIOLOGIST  This document has been electronically signed. Jul 18 2017  8:26AM                < end of copied text >    < from: CT Abdomen and Pelvis No Cont (07.18.17 @ 04:02) >    EXAM:  CT ABDOMEN AND PELVIS                            PROCEDURE DATE:  07/18/2017          INTERPRETATION:  CLINICAL HISTORY: Generalized abdominal pain.    TECHNIQUE: CT of the abdomen and pelvis is performed with axial 3.75mm   images without the administration of IV contrast.  Oral contrast was   withheld as per the referring clinician's request. Sagittal and coronal   reformations are provided.     COMPARISON: CT of the abdomen and pelvis from 8/23/2016    FINDINGS:   The heart is mildly enlarged. The lung bases are clear.    Evaluation of the solid organs and vasculature is limited without the   administration of intravenous contrast. Lack of oral contrast limits   evaluation of the bowel for certain disease processes.    The unenhanced appearance of the liver,  spleen, pancreas, adrenal   glands, and kidneys is unremarkable. Gallbladder is surgically removed.    There are mildly distended loops of small bowel which appear to have a   single transition point in the right lower quadrant to collapsed distal   small bowel. There is no free air or ascites. There is no abnormal bowel   wall thickening. Patient is status post appendectomy.    There is no abdominal or pelvic lymphadenopathy. The abdominal aorta is   normal in caliber.    The urinary bladder is underdistended which sends evaluation of the wall   thickness. There are no pelvic soft tissue masses.    There are no acute osseous abnormalities.    IMPRESSION:    Small bowel obstruction, at least partial, transition point right lower   quadrant. No secondary signs of ischemia at this time.                DANISHA LAWRENCE M.D., RADIOLOGIST  This document has been electronically signed. Jul 18 2017  4:30AM                < end of copied text >    ECG: NSR+APC's, LVH with repol abn

## 2017-07-18 NOTE — CHART NOTE - NSCHARTNOTEFT_GEN_A_CORE
asked to place NGT for SBO    salem NGT placed  without difficulty  + output  +placement by Auscultation     CXR for confirmation

## 2017-07-18 NOTE — CONSULT NOTE ADULT - ASSESSMENT
In the emergency room the patient was found to have a STEMI and is admitted to telemetry  I reviewed the CT scan- the stomach is moderately large. I have suggested that the patient have an NG tube placed to decompress the stomach.  We will follow with you.

## 2017-07-18 NOTE — H&P ADULT - HISTORY OF PRESENT ILLNESS
80 y/o woman PMH colon ca s/p resection remotely, HTN comes in with generalized abdominal pain and lightheadedness.  She states has had abd pain on and off for several days, crampy, diffuse, mod to mild intensity, feels bloating, but no  n/v/d; she has been passing stools. Denies chest pain, SOB, cough, weakness/numbness. Denies headache, had episode vertiginous sx which has now resolved.  No fever/chills, No photophobia/eye pain/changes in vision, No ear pain/sore throat/dysphagia, No chest pain/palpitations, no SOB/cough/wheeze/stridor, No N/V/D, no dysuria/frequency/discharge, No neck/back pain, no rash, no changes in neurological status/function.

## 2017-07-18 NOTE — CONSULT NOTE ADULT - PROBLEM SELECTOR RECOMMENDATION 2
Non-pathognomonic for myocardial infarction at this time. Patient not having any chest pain at present. Further risk stratification pending. Serial cardiac enzymes to be followed, serial EKG, echo pending. Continue beta blocker. Hold aspirin due to possible need for surgical intervention if SBO worsens.

## 2017-07-18 NOTE — ED ADULT NURSE NOTE - OBJECTIVE STATEMENT
pt brought in by daughter with c/o dizziness  for few days  , with abd. pain  started today , denies vomiting or diarrhea but nausea , not in any distress, denies chest pain a s well

## 2017-07-18 NOTE — PROGRESS NOTE ADULT - PROBLEM SELECTOR PLAN 3
cardio consult appreciated, trend trops, ekg does not reflect acute event. no active chest pain. monitor tele, tte,

## 2017-07-19 DIAGNOSIS — R82.90 UNSPECIFIED ABNORMAL FINDINGS IN URINE: ICD-10-CM

## 2017-07-19 DIAGNOSIS — R10.9 UNSPECIFIED ABDOMINAL PAIN: ICD-10-CM

## 2017-07-19 LAB
ANION GAP SERPL CALC-SCNC: 6 MMOL/L — SIGNIFICANT CHANGE UP (ref 5–17)
BASOPHILS # BLD AUTO: 0.1 K/UL — SIGNIFICANT CHANGE UP (ref 0–0.2)
BASOPHILS NFR BLD AUTO: 1.1 % — SIGNIFICANT CHANGE UP (ref 0–2)
BUN SERPL-MCNC: 11 MG/DL — SIGNIFICANT CHANGE UP (ref 7–23)
CALCIUM SERPL-MCNC: 8.9 MG/DL — SIGNIFICANT CHANGE UP (ref 8.5–10.1)
CHLORIDE SERPL-SCNC: 115 MMOL/L — HIGH (ref 96–108)
CO2 SERPL-SCNC: 25 MMOL/L — SIGNIFICANT CHANGE UP (ref 22–31)
CREAT SERPL-MCNC: 1.09 MG/DL — SIGNIFICANT CHANGE UP (ref 0.5–1.3)
CULTURE RESULTS: SIGNIFICANT CHANGE UP
EOSINOPHIL # BLD AUTO: 0.1 K/UL — SIGNIFICANT CHANGE UP (ref 0–0.5)
EOSINOPHIL NFR BLD AUTO: 2.5 % — SIGNIFICANT CHANGE UP (ref 0–6)
GLUCOSE SERPL-MCNC: 100 MG/DL — HIGH (ref 70–99)
HCT VFR BLD CALC: 29.6 % — LOW (ref 34.5–45)
HGB BLD-MCNC: 10.6 G/DL — LOW (ref 11.5–15.5)
LYMPHOCYTES # BLD AUTO: 1 K/UL — SIGNIFICANT CHANGE UP (ref 1–3.3)
LYMPHOCYTES # BLD AUTO: 20.8 % — SIGNIFICANT CHANGE UP (ref 13–44)
MAGNESIUM SERPL-MCNC: 2 MG/DL — SIGNIFICANT CHANGE UP (ref 1.6–2.6)
MCHC RBC-ENTMCNC: 31.1 PG — SIGNIFICANT CHANGE UP (ref 27–34)
MCHC RBC-ENTMCNC: 35.7 GM/DL — SIGNIFICANT CHANGE UP (ref 32–36)
MCV RBC AUTO: 87.2 FL — SIGNIFICANT CHANGE UP (ref 80–100)
MONOCYTES # BLD AUTO: 0.4 K/UL — SIGNIFICANT CHANGE UP (ref 0–0.9)
MONOCYTES NFR BLD AUTO: 8.9 % — SIGNIFICANT CHANGE UP (ref 2–14)
NEUTROPHILS # BLD AUTO: 3.2 K/UL — SIGNIFICANT CHANGE UP (ref 1.8–7.4)
NEUTROPHILS NFR BLD AUTO: 66.7 % — SIGNIFICANT CHANGE UP (ref 43–77)
PHOSPHATE SERPL-MCNC: 3.1 MG/DL — SIGNIFICANT CHANGE UP (ref 2.5–4.5)
PLATELET # BLD AUTO: 253 K/UL — SIGNIFICANT CHANGE UP (ref 150–400)
POTASSIUM SERPL-MCNC: 4.1 MMOL/L — SIGNIFICANT CHANGE UP (ref 3.5–5.3)
POTASSIUM SERPL-SCNC: 4.1 MMOL/L — SIGNIFICANT CHANGE UP (ref 3.5–5.3)
RBC # BLD: 3.4 M/UL — LOW (ref 3.8–5.2)
RBC # FLD: 13.2 % — SIGNIFICANT CHANGE UP (ref 11–15)
SODIUM SERPL-SCNC: 146 MMOL/L — HIGH (ref 135–145)
SPECIMEN SOURCE: SIGNIFICANT CHANGE UP
TROPONIN I SERPL-MCNC: 0.07 NG/ML — HIGH (ref 0.01–0.04)
WBC # BLD: 4.8 K/UL — SIGNIFICANT CHANGE UP (ref 3.8–10.5)
WBC # FLD AUTO: 4.8 K/UL — SIGNIFICANT CHANGE UP (ref 3.8–10.5)

## 2017-07-19 PROCEDURE — 74020: CPT | Mod: 26

## 2017-07-19 PROCEDURE — 93306 TTE W/DOPPLER COMPLETE: CPT | Mod: 26

## 2017-07-19 PROCEDURE — 99232 SBSQ HOSP IP/OBS MODERATE 35: CPT

## 2017-07-19 PROCEDURE — 99233 SBSQ HOSP IP/OBS HIGH 50: CPT

## 2017-07-19 RX ORDER — CEFTRIAXONE 500 MG/1
1 INJECTION, POWDER, FOR SOLUTION INTRAMUSCULAR; INTRAVENOUS EVERY 24 HOURS
Qty: 0 | Refills: 0 | Status: DISCONTINUED | OUTPATIENT
Start: 2017-07-20 | End: 2017-07-20

## 2017-07-19 RX ORDER — CEFTRIAXONE 500 MG/1
INJECTION, POWDER, FOR SOLUTION INTRAMUSCULAR; INTRAVENOUS
Qty: 0 | Refills: 0 | Status: DISCONTINUED | OUTPATIENT
Start: 2017-07-19 | End: 2017-07-20

## 2017-07-19 RX ORDER — CEFTRIAXONE 500 MG/1
1 INJECTION, POWDER, FOR SOLUTION INTRAMUSCULAR; INTRAVENOUS ONCE
Qty: 0 | Refills: 0 | Status: COMPLETED | OUTPATIENT
Start: 2017-07-19 | End: 2017-07-19

## 2017-07-19 RX ADMIN — HEPARIN SODIUM 5000 UNIT(S): 5000 INJECTION INTRAVENOUS; SUBCUTANEOUS at 17:39

## 2017-07-19 RX ADMIN — LISINOPRIL 20 MILLIGRAM(S): 2.5 TABLET ORAL at 06:37

## 2017-07-19 RX ADMIN — CEFTRIAXONE 100 GRAM(S): 500 INJECTION, POWDER, FOR SOLUTION INTRAMUSCULAR; INTRAVENOUS at 15:09

## 2017-07-19 RX ADMIN — MORPHINE SULFATE 2 MILLIGRAM(S): 50 CAPSULE, EXTENDED RELEASE ORAL at 00:15

## 2017-07-19 RX ADMIN — ATENOLOL 25 MILLIGRAM(S): 25 TABLET ORAL at 06:37

## 2017-07-19 RX ADMIN — HEPARIN SODIUM 5000 UNIT(S): 5000 INJECTION INTRAVENOUS; SUBCUTANEOUS at 06:39

## 2017-07-19 RX ADMIN — SODIUM CHLORIDE 100 MILLILITER(S): 9 INJECTION INTRAMUSCULAR; INTRAVENOUS; SUBCUTANEOUS at 06:39

## 2017-07-19 RX ADMIN — AMLODIPINE BESYLATE 10 MILLIGRAM(S): 2.5 TABLET ORAL at 06:37

## 2017-07-19 NOTE — PROGRESS NOTE ADULT - SUBJECTIVE AND OBJECTIVE BOX
Patient is a 81y old  Female who presents with a chief complaint of Abdominal pain for several days. (2017 06:41)      INTERVAL HPI/OVERNIGHT EVENTS:no events, no bm/gas    MEDICATIONS  (STANDING):  ATENolol  Tablet 25 milliGRAM(s) Oral daily  heparin  Injectable 5000 Unit(s) SubCutaneous every 12 hours  sodium chloride 0.9%. 1000 milliLiter(s) (100 mL/Hr) IV Continuous <Continuous>  amLODIPine   Tablet 10 milliGRAM(s) Oral daily  lisinopril 20 milliGRAM(s) Oral daily  cefTRIAXone   IVPB      cefTRIAXone   IVPB 1 Gram(s) IV Intermittent once    MEDICATIONS  (PRN):  morphine  - Injectable 2 milliGRAM(s) IV Push every 6 hours PRN Severe Pain (7 - 10)  ondansetron Injectable 4 milliGRAM(s) IV Push every 6 hours PRN Nausea and/or Vomiting      Allergies    No Known Allergies    Intolerances        REVIEW OF SYSTEMS:  CONSTITUTIONAL: No fever, weight loss, or fatigue  EYES: No eye pain, visual disturbances, or discharge  ENMT:  No difficulty hearing, + vertigo; No sinus or throat pain  NECK: No pain or stiffness  BREASTS: No pain, masses, or nipple discharge  RESPIRATORY: No cough, wheezing, chills or hemoptysis; No shortness of breath  CARDIOVASCULAR: No chest pain, palpitations, dizziness, or leg swelling  GASTROINTESTINAL: + abdominal  pain. No nausea, vomiting, or hematemesis; No diarrhea or constipation. No melena or hematochezia.  GENITOURINARY: No dysuria, frequency, hematuria, or incontinence  NEUROLOGICAL: No headaches, memory loss, loss of strength, numbness, or tremors  SKIN: No itching, burning, rashes, or lesions   Vital Signs Last 24 Hrs  T(C): 37.1 (2017 12:18), Max: 37.1 (2017 00:54)  T(F): 98.8 (2017 12:18), Max: 98.8 (2017 00:54)  HR: 57 (2017 12:18) (53 - 62)  BP: 139/58 (2017 12:18) (138/67 - 147/69)  BP(mean): --  RR: 18 (2017 12:18) (18 - 18)  SpO2: 95% (2017 12:18) (93% - 98%)    PHYSICAL EXAM:  GENERAL: NAD, well-groomed, well-developed  HEAD:  Atraumatic, Normocephalic  EYES: EOMI, PERRLA, conjunctiva and sclera clear  ENMT: No tonsillar erythema, exudates, or enlargement; No lesions  NECK: Supple, No JVD  NERVOUS SYSTEM:  Alert & Oriented X3, CN 2-12 intact; Motor Strength 5/5 B/L upper and lower extremities; DTRs 2+ intact and symmetric  CHEST/LUNG: Clear to percussion bilaterally; No rales, rhonchi, wheezing, or rubs  HEART: Regular rate and rhythm; No murmurs, rubs, or gallops  ABDOMEN: Soft, Nontender, no rebound or guarding, mildly distended; Bowel sounds present  EXTREMITIES: + Peripheral Pulses, No clubbing, cyanosis, or edema  LYMPH: No lymphadenopathy noted  SKIN: No rashes or lesions    LABS:                        10.6   4.8   )-----------( 253      ( 2017 04:11 )             29.6     07-    146<H>  |  115<H>  |  11  ----------------------------<  100<H>  4.1   |  25  |  1.09    Ca    8.9      2017 03:59  Phos  3.1     -  Mg     2.0     -    TPro  7.5  /  Alb  3.7  /  TBili  0.8  /  DBili  x   /  AST  13<L>  /  ALT  24  /  AlkPhos  65  07-18      Urinalysis Basic - ( 2017 02:20 )    Color: Yellow / Appearance: Clear / S.010 / pH: x  Gluc: x / Ketone: Negative  / Bili: Negative / Urobili: Negative mg/dL   Blood: x / Protein: 30 mg/dL / Nitrite: Negative   Leuk Esterase: Moderate / RBC: 0-2 /HPF / WBC 11-25   Sq Epi: x / Non Sq Epi: x / Bacteria: x      CAPILLARY BLOOD GLUCOSE          RADIOLOGY & ADDITIONAL TESTS:    Imaging Personally Reviewed:  [ ] YES  [ ] NO    Consultant(s) Notes Reviewed:  [ ] YES  [ ] NO    Care Discussed with Consultants/Other Providers [ ] YES  [ ] NO

## 2017-07-19 NOTE — PROGRESS NOTE ADULT - ASSESSMENT
80 y/o woman with history colon resection for Ca, presents with diffuse abdominal pain with abdominal distention; no nausea or vomiting, she has been passing stools. CT shows at least partial  SBO. She had an episode of vertigo, head CT shows mastoid effusion. Also noted is a UTI and elevated troponin. She denies chest pain or SOB.

## 2017-07-19 NOTE — PROGRESS NOTE ADULT - PROBLEM SELECTOR PLAN 1
NPO, IV fluids, Ng tube , pain control , surgery following Surgery f/u  NPO, NGT to suction  monitor electrolytes  IV hydration, pain control  No evidence of ischemia noted on CAT scan.

## 2017-07-19 NOTE — PROGRESS NOTE ADULT - SUBJECTIVE AND OBJECTIVE BOX
Patient is a 81y old  Female who presents with a chief complaint of Abdominal pain for several days. (18 Jul 2017 06:41)      PAST MEDICAL & SURGICAL HISTORY:  Colon cancer  HTN (hypertension)  History of cholecystectomy  S/P colon resection      INTERVAL HISTORY: Resting in bed, not in any acute distress  	  MEDICATIONS:  MEDICATIONS  (STANDING):  ATENolol  Tablet 25 milliGRAM(s) Oral daily  heparin  Injectable 5000 Unit(s) SubCutaneous every 12 hours  sodium chloride 0.9%. 1000 milliLiter(s) (100 mL/Hr) IV Continuous <Continuous>  amLODIPine   Tablet 10 milliGRAM(s) Oral daily  lisinopril 20 milliGRAM(s) Oral daily    MEDICATIONS  (PRN):  morphine  - Injectable 2 milliGRAM(s) IV Push every 6 hours PRN Severe Pain (7 - 10)  ondansetron Injectable 4 milliGRAM(s) IV Push every 6 hours PRN Nausea and/or Vomiting      Vitals:  T(F): 98.8 (07-19-17 @ 05:27), Max: 98.8 (07-19-17 @ 00:54)  HR: 62 (07-19-17 @ 05:27) (53 - 62)  BP: 147/69 (07-19-17 @ 05:27) (138/67 - 147/69)  RR: 18 (07-19-17 @ 05:27) (18 - 18)  SpO2: 93% (07-19-17 @ 05:27) (93% - 98%)    07-18 @ 07:01  -  07-19 @ 07:00  --------------------------------------------------------  IN:    sodium chloride 0.9%.: 1200 mL    Solution: 100 mL  Total IN: 1300 mL    OUT:    Voided: 700 mL  Total OUT: 700 mL    Total NET: 600 mL      Weight (kg): 79.8 (07-18 @ 11:08)  BMI (kg/m2): 32.2 (07-18 @ 11:08)      PHYSICAL EXAM:  Neuro: Awake, responsive  CV: S1 S2 RRR  Lungs: CTABL  GI: Soft, BS +, ND, NT  Extremities: No edema    TELEMETRY: RSR  	      RADIOLOGY: < from: Xray Chest 1 View AP/PA. (07.18.17 @ 02:41) >  There are no focal air space opacities. The cardiac and mediastinal   contours are prominent, which may be due to magnification from AP   technique and shallow inspiration. The osseous structures are intact.    IMPRESSION:    No focal air space opacities.    < end of copied text >      < from: Xray Abdomen Multiple Views (07.19.17 @ 09:36) >  FINDINGS AND   IMPRESSION: There is an enteric tube with the tip in the stomach.   Surgical clips are noted in the right hemiabdomen. There is a nonspecific   bowel gas pattern with a few prominent air-filled loops of small bowel in   the midabdomen. No gross free air identified.    < end of copied text >    < from: CT Abdomen and Pelvis No Cont (07.18.17 @ 04:02) >  mall bowel obstruction, at least partial, transition point right lower   quadrant. No secondary signs of ischemia at this time.    < end of copied text >        DIAGNOSTIC TESTING:    [P ] Echocardiogram:    LABS:	 	    CARDIAC MARKERS:  Troponin I, Serum: .075 ng/mL (07-19 @ 03:59)  Troponin I, Serum: .089 ng/mL (07-18 @ 18:13)  Troponin I, Serum: .096 ng/mL (07-18 @ 09:47)  Troponin I, Serum: .096 ng/mL (07-18 @ 02:09)          19 Jul 2017 03:59    146    |  115    |  11     ----------------------------<  100    4.1     |  25     |  1.09   18 Jul 2017 09:47    144    |  111    |  14     ----------------------------<  110    4.3     |  26     |  1.17   18 Jul 2017 02:09    144    |  110    |  17     ----------------------------<  129    4.7     |  27     |  1.29     Ca    8.9        19 Jul 2017 03:59  Phos  3.1       19 Jul 2017 03:59  Mg     2.0       19 Jul 2017 03:59    TPro  7.5    /  Alb  3.7    /  TBili  0.8    /  DBili  x      /  AST  13     /  ALT  24     /  AlkPhos  65     18 Jul 2017 02:09                          10.6   4.8   )-----------( 253      ( 19 Jul 2017 04:11 )             29.6 ,                       12.7   6.5   )-----------( 289      ( 18 Jul 2017 09:47 )             36.0 ,                       12.6   7.2   )-----------( 292      ( 18 Jul 2017 02:09 )             34.4     Lipid Profile: 07-18 @ 14:54  HDL/Total Cholesterol: 5.7 RATIO  HDL Chol:              31 mg/dL  Serum Chol:            177 mg/dL  Direct LDL:            109 mg/dL  Triglycerides:         185 mg/dL      TSH: Thyroid Stimulating Hormone, Serum: 2.240 uU/mL (07-18 @ 09:47) Patient is a 81y old  Female who presents with a chief complaint of Abdominal pain for several days. (18 Jul 2017 06:41)      PAST MEDICAL & SURGICAL HISTORY:  Colon cancer  HTN (hypertension)  History of cholecystectomy  S/P colon resection      INTERVAL HISTORY: Resting in bed, not in any acute distress,  denies any sob, chest pain or abdomina pain, has some abdominal discomfort, no c/o nausea or vomiting, NGT to low suction.  	  MEDICATIONS:  MEDICATIONS  (STANDING):  ATENolol  Tablet 25 milliGRAM(s) Oral daily  heparin  Injectable 5000 Unit(s) SubCutaneous every 12 hours  sodium chloride 0.9%. 1000 milliLiter(s) (100 mL/Hr) IV Continuous <Continuous>  amLODIPine   Tablet 10 milliGRAM(s) Oral daily  lisinopril 20 milliGRAM(s) Oral daily    MEDICATIONS  (PRN):  morphine  - Injectable 2 milliGRAM(s) IV Push every 6 hours PRN Severe Pain (7 - 10)  ondansetron Injectable 4 milliGRAM(s) IV Push every 6 hours PRN Nausea and/or Vomiting      Vitals:  T(F): 98.8 (07-19-17 @ 05:27), Max: 98.8 (07-19-17 @ 00:54)  HR: 62 (07-19-17 @ 05:27) (53 - 62)  BP: 147/69 (07-19-17 @ 05:27) (138/67 - 147/69)  RR: 18 (07-19-17 @ 05:27) (18 - 18)  SpO2: 93% (07-19-17 @ 05:27) (93% - 98%)    07-18 @ 07:01  -  07-19 @ 07:00  --------------------------------------------------------  IN:    sodium chloride 0.9%.: 1200 mL    Solution: 100 mL  Total IN: 1300 mL    OUT:    Voided: 700 mL  Total OUT: 700 mL    Total NET: 600 mL      Weight (kg): 79.8 (07-18 @ 11:08)  BMI (kg/m2): 32.2 (07-18 @ 11:08)      PHYSICAL EXAM:  Neuro: Awake, responsive  CV: S1 S2 RRR, 2/6 soft systolic murmur  Lungs: CTABL  GI: Soft, BS +, ND, NT, + NGT  Extremities: No edema    TELEMETRY: RSR  	      RADIOLOGY: < from: Xray Chest 1 View AP/PA. (07.18.17 @ 02:41) >  There are no focal air space opacities. The cardiac and mediastinal   contours are prominent, which may be due to magnification from AP   technique and shallow inspiration. The osseous structures are intact.    IMPRESSION:    No focal air space opacities.    < end of copied text >      < from: Xray Abdomen Multiple Views (07.19.17 @ 09:36) >  FINDINGS AND   IMPRESSION: There is an enteric tube with the tip in the stomach.   Surgical clips are noted in the right hemiabdomen. There is a nonspecific   bowel gas pattern with a few prominent air-filled loops of small bowel in   the midabdomen. No gross free air identified.    < end of copied text >    < from: CT Abdomen and Pelvis No Cont (07.18.17 @ 04:02) >  mall bowel obstruction, at least partial, transition point right lower   quadrant. No secondary signs of ischemia at this time.    < end of copied text >        DIAGNOSTIC TESTING:    [P ] Echocardiogram:    LABS:	 	    CARDIAC MARKERS:  Troponin I, Serum: .075 ng/mL (07-19 @ 03:59)  Troponin I, Serum: .089 ng/mL (07-18 @ 18:13)  Troponin I, Serum: .096 ng/mL (07-18 @ 09:47)  Troponin I, Serum: .096 ng/mL (07-18 @ 02:09)          19 Jul 2017 03:59    146    |  115    |  11     ----------------------------<  100    4.1     |  25     |  1.09   18 Jul 2017 09:47    144    |  111    |  14     ----------------------------<  110    4.3     |  26     |  1.17   18 Jul 2017 02:09    144    |  110    |  17     ----------------------------<  129    4.7     |  27     |  1.29     Ca    8.9        19 Jul 2017 03:59  Phos  3.1       19 Jul 2017 03:59  Mg     2.0       19 Jul 2017 03:59    TPro  7.5    /  Alb  3.7    /  TBili  0.8    /  DBili  x      /  AST  13     /  ALT  24     /  AlkPhos  65     18 Jul 2017 02:09                          10.6   4.8   )-----------( 253      ( 19 Jul 2017 04:11 )             29.6 ,                       12.7   6.5   )-----------( 289      ( 18 Jul 2017 09:47 )             36.0 ,                       12.6   7.2   )-----------( 292      ( 18 Jul 2017 02:09 )             34.4     Lipid Profile: 07-18 @ 14:54  HDL/Total Cholesterol: 5.7 RATIO  HDL Chol:              31 mg/dL  Serum Chol:            177 mg/dL  Direct LDL:            109 mg/dL  Triglycerides:         185 mg/dL      TSH: Thyroid Stimulating Hormone, Serum: 2.240 uU/mL (07-18 @ 09:47)

## 2017-07-19 NOTE — PROGRESS NOTE ADULT - PROBLEM SELECTOR PLAN 1
NPO, IV fluids, Ng tube , pain control , surgery following  pt still did not have bm/gas. Abd xray noted

## 2017-07-19 NOTE — PROGRESS NOTE ADULT - SUBJECTIVE AND OBJECTIVE BOX
SURGERY PROGRESS HPI:  Pt seen and examined at bedside. Denies pain and complaints. States that she is feeling better than yesterday. Pt tolerating NGT. Pt denies nausea and vomiting. No recent BM/flatus. Pt denies chest pain, SOB, dizziness, fever, chills.     Vital Signs Last 24 Hrs  T(C): 37.1 (2017 05:27), Max: 37.1 (2017 00:54)  T(F): 98.8 (2017 05:27), Max: 98.8 (2017 00:54)  HR: 62 (2017 05:27) (53 - 62)  BP: 147/69 (2017 05:27) (138/67 - 182/69)  BP(mean): --  RR: 18 (2017 05:27) (15 - 18)  SpO2: 93% (2017 05:27) (93% - 98%)      PHYSICAL EXAM:    GENERAL: NAD  HEAD:  NGT with 400cc output since insertion yesterday  CHEST/LUNG: Clear to ausculation, bilaterally   HEART: RRR S1S2  ABDOMEN: non distended, minimal BS, soft, non tender, no guarding  EXTREMITIES:  calf soft, non tender b/l    I&O's Detail    2017 07:01  -  2017 06:25  --------------------------------------------------------  IN:    Solution: 100 mL  Total IN: 100 mL    OUT:  Total OUT: 0 mL    Total NET: 100 mL          LABS:                        10.6   4.8   )-----------( 253      ( 2017 04:11 )             29.6     07-19    146<H>  |  115<H>  |  11  ----------------------------<  100<H>  4.1   |  25  |  1.09    Ca    8.9      2017 03:59  Phos  3.1     07-  Mg     2.0     07-    TPro  7.5  /  Alb  3.7  /  TBili  0.8  /  DBili  x   /  AST  13<L>  /  ALT  24  /  AlkPhos  65  07-18      Urinalysis Basic - ( 2017 02:20 )    Color: Yellow / Appearance: Clear / S.010 / pH: x  Gluc: x / Ketone: Negative  / Bili: Negative / Urobili: Negative mg/dL   Blood: x / Protein: 30 mg/dL / Nitrite: Negative   Leuk Esterase: Moderate / RBC: 0-2 /HPF / WBC 11-25   Sq Epi: x / Non Sq Epi: x / Bacteria: x    Assessment: 82 y/o woman with history colon resection for Ca, presents with diffuse abdominal pain with abdominal distention; no nausea or vomiting, she has been passing stools prior to admission. CT shows at least partial SBO. NGT in.    Plan:  -continue NGT to LWS  -monitor bowel function  -pain management prn  -continue DVT prophylaxis, OOB, Ambulating   -f/u labs  -continue medical management  -will discuss pt with surgical attending

## 2017-07-19 NOTE — PROGRESS NOTE ADULT - ASSESSMENT
82 y/o woman with history colon resection for Ca, presents with diffuse abdominal pain with abdominal distention; no nausea or vomiting, she has been passing stools. CT shows at least partial  SBO. She had an episode of vertigo, head CT shows mastoid effusion. Also noted is a UTI and elevated troponin. She denies chest pain or SOB. 80 y/o woman with history colon resection for Ca, presents with diffuse abdominal pain with abdominal distention;  CT shows at least partial SBO. She had an episode of vertigo, head CT shows mastoid effusion. + UTI, mild troponin release.

## 2017-07-19 NOTE — PROGRESS NOTE ADULT - PROBLEM SELECTOR PLAN 3
cardio consult appreciated, trend trops, ekg does not reflect acute event. no active chest pain. monitor tele, tte, Non-pathognomonic for myocardial infarction at this time.   Patient not having any chest pain at present.    Troponin down trending, no acute findings in EKG  Echo pending.   Continue beta blocker.  c/w telemetry

## 2017-07-19 NOTE — PROGRESS NOTE ADULT - PROBLEM SELECTOR PLAN 2
monitor BP, continue medications BP better controlled now  c/w Norvasc and atenolol BP better controlled now  c/w Norvasc, lisinopril, and atenolol

## 2017-07-20 LAB
ANION GAP SERPL CALC-SCNC: 11 MMOL/L — SIGNIFICANT CHANGE UP (ref 5–17)
BUN SERPL-MCNC: 13 MG/DL — SIGNIFICANT CHANGE UP (ref 7–23)
CALCIUM SERPL-MCNC: 9.4 MG/DL — SIGNIFICANT CHANGE UP (ref 8.5–10.1)
CHLORIDE SERPL-SCNC: 115 MMOL/L — HIGH (ref 96–108)
CO2 SERPL-SCNC: 21 MMOL/L — LOW (ref 22–31)
CREAT SERPL-MCNC: 0.93 MG/DL — SIGNIFICANT CHANGE UP (ref 0.5–1.3)
GLUCOSE SERPL-MCNC: 64 MG/DL — LOW (ref 70–99)
HCT VFR BLD CALC: 31 % — LOW (ref 34.5–45)
HGB BLD-MCNC: 10.7 G/DL — LOW (ref 11.5–15.5)
MAGNESIUM SERPL-MCNC: 1.9 MG/DL — SIGNIFICANT CHANGE UP (ref 1.6–2.6)
MCHC RBC-ENTMCNC: 30.6 PG — SIGNIFICANT CHANGE UP (ref 27–34)
MCHC RBC-ENTMCNC: 34.5 GM/DL — SIGNIFICANT CHANGE UP (ref 32–36)
MCV RBC AUTO: 88.8 FL — SIGNIFICANT CHANGE UP (ref 80–100)
PHOSPHATE SERPL-MCNC: 2.6 MG/DL — SIGNIFICANT CHANGE UP (ref 2.5–4.5)
PLATELET # BLD AUTO: 176 K/UL — SIGNIFICANT CHANGE UP (ref 150–400)
POTASSIUM SERPL-MCNC: 4 MMOL/L — SIGNIFICANT CHANGE UP (ref 3.5–5.3)
POTASSIUM SERPL-SCNC: 4 MMOL/L — SIGNIFICANT CHANGE UP (ref 3.5–5.3)
RBC # BLD: 3.49 M/UL — LOW (ref 3.8–5.2)
RBC # FLD: 13 % — SIGNIFICANT CHANGE UP (ref 11–15)
SODIUM SERPL-SCNC: 147 MMOL/L — HIGH (ref 135–145)
WBC # BLD: 5.4 K/UL — SIGNIFICANT CHANGE UP (ref 3.8–10.5)
WBC # FLD AUTO: 5.4 K/UL — SIGNIFICANT CHANGE UP (ref 3.8–10.5)

## 2017-07-20 PROCEDURE — 99233 SBSQ HOSP IP/OBS HIGH 50: CPT

## 2017-07-20 RX ADMIN — AMLODIPINE BESYLATE 10 MILLIGRAM(S): 2.5 TABLET ORAL at 07:00

## 2017-07-20 RX ADMIN — HEPARIN SODIUM 5000 UNIT(S): 5000 INJECTION INTRAVENOUS; SUBCUTANEOUS at 17:36

## 2017-07-20 RX ADMIN — ATENOLOL 25 MILLIGRAM(S): 25 TABLET ORAL at 07:00

## 2017-07-20 RX ADMIN — LISINOPRIL 20 MILLIGRAM(S): 2.5 TABLET ORAL at 07:00

## 2017-07-20 RX ADMIN — SODIUM CHLORIDE 100 MILLILITER(S): 9 INJECTION INTRAMUSCULAR; INTRAVENOUS; SUBCUTANEOUS at 07:00

## 2017-07-20 NOTE — PROGRESS NOTE ADULT - PROBLEM SELECTOR PLAN 1
Surgery f/u  NPO, NGT to suction  monitor electrolytes  IV hydration, pain control  No evidence of ischemia noted on CAT scan. Surgery f/u  NPO  monitor electrolytes  IV hydration, pain control  No evidence of ischemia noted on CAT scan.

## 2017-07-20 NOTE — PROGRESS NOTE ADULT - SUBJECTIVE AND OBJECTIVE BOX
Patient is a 81y old  Female who presents with a chief complaint of Abdominal pain for several days. (18 Jul 2017 06:41)    PAST MEDICAL & SURGICAL HISTORY:  Colon cancer  HTN (hypertension)  History of cholecystectomy  S/P colon resection    INTERVAL HISTORY: resting in bed, not in acute distress, denies any chest pain or SOB  	  MEDICATIONS:  MEDICATIONS  (STANDING):  ATENolol  Tablet 25 milliGRAM(s) Oral daily  heparin  Injectable 5000 Unit(s) SubCutaneous every 12 hours  sodium chloride 0.9%. 1000 milliLiter(s) (100 mL/Hr) IV Continuous <Continuous>  amLODIPine   Tablet 10 milliGRAM(s) Oral daily  lisinopril 20 milliGRAM(s) Oral daily  cefTRIAXone   IVPB      cefTRIAXone   IVPB 1 Gram(s) IV Intermittent every 24 hours    MEDICATIONS  (PRN):  morphine  - Injectable 2 milliGRAM(s) IV Push every 6 hours PRN Severe Pain (7 - 10)  ondansetron Injectable 4 milliGRAM(s) IV Push every 6 hours PRN Nausea and/or Vomiting    Vitals:  T(F): 98.6 (07-20-17 @ 11:02), Max: 98.8 (07-19-17 @ 12:18)  HR: 54 (07-20-17 @ 11:02) (54 - 67)  BP: 135/57 (07-20-17 @ 11:02) (135/57 - 151/64)  RR: 17 (07-20-17 @ 11:02) (17 - 18)  SpO2: 100% (07-20-17 @ 11:02) (94% - 100%)    07-19 @ 07:01  -  07-20 @ 07:00  --------------------------------------------------------  IN:  Total IN: 0 mL    OUT:    Nasoenteral Tube: 400 mL    Voided: 600 mL  Total OUT: 1000 mL    Total NET: -1000 mL    Weight (kg): 79.8 (07-18 @ 11:08)  BMI (kg/m2): 32.2 (07-18 @ 11:08)    PHYSICAL EXAM:  Neuro: Awake, responsive  CV: S1 S2 RRR  Lungs: CTABL  GI: Soft, BS +, ND, NT  Extremities: No edema    TELEMETRY: RSR     RADIOLOGY: < from: Xray Abdomen Multiple Views (07.19.17 @ 09:36) >  IMPRESSION: There is an enteric tube with the tip in the stomach.   Surgical clips are noted in the right hemiabdomen. There is a nonspecific   bowel gas pattern with a few prominent air-filled loops of small bowel in   the midabdomen. Nogross free air identified.    < end of copied text >      DIAGNOSTIC TESTING:    [ x] Echocardiogram:< from: TTE Echo Doppler w/o Cont (07.19.17 @ 15:12) >   1. Left ventricular ejection fraction, by visual estimation, is 60 to   65%.   2. Mild mitral and tricuspid valve regurgitation.   3. Thickening of the anterior and posterior mitral valve leaflets.   4. Mild aortic and pulmonic valve regurgitation.   5. Estimated pulmonary artery systolic pressure is 37.6 mmHg assuming a   right atrial pressure of 5 mmHg, which is consistent with mild pulmonary   hypertension.    < end of copied text >    LABS:	 	    CARDIAC MARKERS:  Troponin I, Serum: .075 ng/mL (07-19 @ 03:59)  Troponin I, Serum: .089 ng/mL (07-18 @ 18:13)  Troponin I, Serum: .096 ng/mL (07-18 @ 09:47)  Troponin I, Serum: .096 ng/mL (07-18 @ 02:09)    20 Jul 2017 07:44    147    |  115    |  13     ----------------------------<  64     4.0     |  21     |  0.93   19 Jul 2017 03:59    146    |  115    |  11     ----------------------------<  100    4.1     |  25     |  1.09   18 Jul 2017 09:47    144    |  111    |  14     ----------------------------<  110    4.3     |  26     |  1.17     Ca    9.4        20 Jul 2017 07:44  Phos  2.6       20 Jul 2017 07:44  Mg     1.9       20 Jul 2017 07:44                            10.7   5.4   )-----------( 176      ( 20 Jul 2017 07:44 )             31.0 ,                       10.6   4.8   )-----------( 253      ( 19 Jul 2017 04:11 )             29.6 ,                       12.7   6.5   )-----------( 289      ( 18 Jul 2017 09:47 )             36.0 ,                       12.6   7.2   )-----------( 292      ( 18 Jul 2017 02:09 )             34.4     Lipid Profile: 07-18 @ 14:54  HDL/Total Cholesterol: 5.7 RATIO  HDL Chol:              31 mg/dL  Serum Chol:            177 mg/dL  Direct LDL:            109 mg/dL  Triglycerides:         185 mg/dL    TSH: Thyroid Stimulating Hormone, Serum: 2.240 uU/mL (07-18 @ 09:47) Patient is a 81y old  Female who presents with a chief complaint of Abdominal pain for several days. (18 Jul 2017 06:41)    PAST MEDICAL & SURGICAL HISTORY:  Colon cancer  HTN (hypertension)  History of cholecystectomy  S/P colon resection    INTERVAL HISTORY: resting in bed, not in acute distress, denies any chest pain or SOB, denies any abdominal pain, nausea or vomiting.   + flatus. 	  MEDICATIONS:  MEDICATIONS  (STANDING):  ATENolol  Tablet 25 milliGRAM(s) Oral daily  heparin  Injectable 5000 Unit(s) SubCutaneous every 12 hours  sodium chloride 0.9%. 1000 milliLiter(s) (100 mL/Hr) IV Continuous <Continuous>  amLODIPine   Tablet 10 milliGRAM(s) Oral daily  lisinopril 20 milliGRAM(s) Oral daily  cefTRIAXone   IVPB      cefTRIAXone   IVPB 1 Gram(s) IV Intermittent every 24 hours    MEDICATIONS  (PRN):  morphine  - Injectable 2 milliGRAM(s) IV Push every 6 hours PRN Severe Pain (7 - 10)  ondansetron Injectable 4 milliGRAM(s) IV Push every 6 hours PRN Nausea and/or Vomiting    Vitals:  T(F): 98.6 (07-20-17 @ 11:02), Max: 98.8 (07-19-17 @ 12:18)  HR: 54 (07-20-17 @ 11:02) (54 - 67)  BP: 135/57 (07-20-17 @ 11:02) (135/57 - 151/64)  RR: 17 (07-20-17 @ 11:02) (17 - 18)  SpO2: 100% (07-20-17 @ 11:02) (94% - 100%)    07-19 @ 07:01  -  07-20 @ 07:00  --------------------------------------------------------  IN:  Total IN: 0 mL    OUT:    Nasoenteral Tube: 400 mL    Voided: 600 mL  Total OUT: 1000 mL    Total NET: -1000 mL    Weight (kg): 79.8 (07-18 @ 11:08)  BMI (kg/m2): 32.2 (07-18 @ 11:08)    PHYSICAL EXAM:  Neuro: Awake, responsive  CV: S1 S2 RRR  Lungs: CTABL  GI: Soft, BS +, ND, NT  Extremities: No edema    TELEMETRY: RSR     RADIOLOGY: < from: Xray Abdomen Multiple Views (07.19.17 @ 09:36) >  IMPRESSION: There is an enteric tube with the tip in the stomach.   Surgical clips are noted in the right hemiabdomen. There is a nonspecific   bowel gas pattern with a few prominent air-filled loops of small bowel in   the midabdomen. Nogross free air identified.    < end of copied text >      DIAGNOSTIC TESTING:    [ x] Echocardiogram:< from: TTE Echo Doppler w/o Cont (07.19.17 @ 15:12) >   1. Left ventricular ejection fraction, by visual estimation, is 60 to   65%.   2. Mild mitral and tricuspid valve regurgitation.   3. Thickening of the anterior and posterior mitral valve leaflets.   4. Mild aortic and pulmonic valve regurgitation.   5. Estimated pulmonary artery systolic pressure is 37.6 mmHg assuming a   right atrial pressure of 5 mmHg, which is consistent with mild pulmonary   hypertension.    < end of copied text >    LABS:	 	    CARDIAC MARKERS:  Troponin I, Serum: .075 ng/mL (07-19 @ 03:59)  Troponin I, Serum: .089 ng/mL (07-18 @ 18:13)  Troponin I, Serum: .096 ng/mL (07-18 @ 09:47)  Troponin I, Serum: .096 ng/mL (07-18 @ 02:09)    20 Jul 2017 07:44    147    |  115    |  13     ----------------------------<  64     4.0     |  21     |  0.93   19 Jul 2017 03:59    146    |  115    |  11     ----------------------------<  100    4.1     |  25     |  1.09   18 Jul 2017 09:47    144    |  111    |  14     ----------------------------<  110    4.3     |  26     |  1.17     Ca    9.4        20 Jul 2017 07:44  Phos  2.6       20 Jul 2017 07:44  Mg     1.9       20 Jul 2017 07:44                            10.7   5.4   )-----------( 176      ( 20 Jul 2017 07:44 )             31.0 ,                       10.6   4.8   )-----------( 253      ( 19 Jul 2017 04:11 )             29.6 ,                       12.7   6.5   )-----------( 289      ( 18 Jul 2017 09:47 )             36.0 ,                       12.6   7.2   )-----------( 292      ( 18 Jul 2017 02:09 )             34.4     Lipid Profile: 07-18 @ 14:54  HDL/Total Cholesterol: 5.7 RATIO  HDL Chol:              31 mg/dL  Serum Chol:            177 mg/dL  Direct LDL:            109 mg/dL  Triglycerides:         185 mg/dL    TSH: Thyroid Stimulating Hormone, Serum: 2.240 uU/mL (07-18 @ 09:47)

## 2017-07-20 NOTE — PHYSICAL THERAPY INITIAL EVALUATION ADULT - PLANNED THERAPY INTERVENTIONS, PT EVAL
postural re-education/stretching/neuromuscular re-education/gait training/transfer training/lumbar stabilization/strengthening/balance training

## 2017-07-20 NOTE — PROGRESS NOTE ADULT - SUBJECTIVE AND OBJECTIVE BOX
Patient is a 81y old  Female who presents with a chief complaint of Abdominal pain for several days. (18 Jul 2017 06:41)      INTERVAL HPI/OVERNIGHT EVENTS: feels better today, still no bm/flatus    MEDICATIONS  (STANDING):  ATENolol  Tablet 25 milliGRAM(s) Oral daily  heparin  Injectable 5000 Unit(s) SubCutaneous every 12 hours  sodium chloride 0.9%. 1000 milliLiter(s) (100 mL/Hr) IV Continuous <Continuous>  amLODIPine   Tablet 10 milliGRAM(s) Oral daily  lisinopril 20 milliGRAM(s) Oral daily  cefTRIAXone   IVPB      cefTRIAXone   IVPB 1 Gram(s) IV Intermittent every 24 hours    MEDICATIONS  (PRN):  morphine  - Injectable 2 milliGRAM(s) IV Push every 6 hours PRN Severe Pain (7 - 10)  ondansetron Injectable 4 milliGRAM(s) IV Push every 6 hours PRN Nausea and/or Vomiting      Allergies    No Known Allergies    Intolerances        REVIEW OF SYSTEMS:  CONSTITUTIONAL: No fever, weight loss, or fatigue  EYES: No eye pain, visual disturbances, or discharge  ENMT:  No difficulty hearing, tinnitus, vertigo; No sinus or throat pain  NECK: No pain or stiffness  BREASTS: No pain, masses, or nipple discharge  RESPIRATORY: No cough, wheezing, chills or hemoptysis; No shortness of breath  CARDIOVASCULAR: No chest pain, palpitations, dizziness, or leg swelling  GASTROINTESTINAL: Mild abd pain. No nausea, vomiting, or hematemesis; No diarrhea or constipation. No melena or hematochezia.  GENITOURINARY: No dysuria, frequency, hematuria, or incontinence  NEUROLOGICAL: No headaches, memory loss, loss of strength, numbness, or tremors  SKIN: No itching, burning, rashes, or lesions   LYMPH NODES: No enlarged glands  ENDOCRINE: No heat or cold intolerance; No hair loss  MUSCULOSKELETAL: No joint pain or swelling; No muscle, back, or extremity pain  PSYCHIATRIC: No depression, anxiety, mood swings, or difficulty sleeping  HEME/LYMPH: No easy bruising, or bleeding gums  ALLERGY AND IMMUNOLOGIC: No hives or eczema    Vital Signs Last 24 Hrs  T(C): 37 (20 Jul 2017 11:02), Max: 37.1 (19 Jul 2017 16:39)  T(F): 98.6 (20 Jul 2017 11:02), Max: 98.8 (19 Jul 2017 16:39)  HR: 54 (20 Jul 2017 11:02) (54 - 67)  BP: 135/57 (20 Jul 2017 11:02) (135/57 - 151/64)  BP(mean): --  RR: 17 (20 Jul 2017 11:02) (17 - 18)  SpO2: 100% (20 Jul 2017 11:02) (94% - 100%)    PHYSICAL EXAM:  GENERAL: NAD, well-groomed, well-developed  HEAD:  Atraumatic, Normocephalic  EYES: EOMI, PERRLA, conjunctiva and sclera clear  ENMT: No tonsillar erythema, exudates, or enlargement; Moist mucous membranes, Good dentition, No lesions  NECK: Supple, No JVD, Normal thyroid  NERVOUS SYSTEM:  Alert & Oriented X3, Good concentration; Motor Strength 5/5 B/L upper and lower extremities; DTRs 2+ intact and symmetric  CHEST/LUNG: Clear to percussion bilaterally; No rales, rhonchi, wheezing, or rubs  HEART: Regular rate and rhythm; No murmurs, rubs, or gallops  ABDOMEN: Soft, Nontender, Nondistended; Bowel sounds present  EXTREMITIES:  2+ Peripheral Pulses, No clubbing, cyanosis, or edema  LYMPH: No lymphadenopathy noted  SKIN: No rashes or lesions    LABS:                        10.7   5.4   )-----------( 176      ( 20 Jul 2017 07:44 )             31.0     07-20    147<H>  |  115<H>  |  13  ----------------------------<  64<L>  4.0   |  21<L>  |  0.93    Ca    9.4      20 Jul 2017 07:44  Phos  2.6     07-20  Mg     1.9     07-20          CAPILLARY BLOOD GLUCOSE          RADIOLOGY & ADDITIONAL TESTS:    Imaging Personally Reviewed:  [ ] YES  [ ] NO    Consultant(s) Notes Reviewed:  [ ] YES  [ ] NO    Care Discussed with Consultants/Other Providers [ ] YES  [ ] NO

## 2017-07-20 NOTE — PROGRESS NOTE ADULT - PROBLEM SELECTOR PLAN 3
cardio consult appreciated, trend trops, ekg does not reflect acute event. no active chest pain.   tele w no events, echo wnl

## 2017-07-20 NOTE — PHYSICAL THERAPY INITIAL EVALUATION ADULT - MODIFIED CLINICAL TEST OF SENSORY INTEGRATION IN BALANCE TEST
Barthel Index: Feeding Score _10__, Bathing Score _0__, Grooming Score _ 5__, Dressing Score _5__, Bowels Score _5__, Bladder Score _5__, Toilet Score _5__, Transfers Score _10__, Mobility Score _10__, Stairs Score _0__,     Total Score _55__

## 2017-07-20 NOTE — PHYSICAL THERAPY INITIAL EVALUATION ADULT - CRITERIA FOR SKILLED THERAPEUTIC INTERVENTIONS
risk reduction/prevention/anticipated discharge recommendation/functional limitations in following categories/rehab potential/predicted duration of therapy intervention/impairments found/therapy frequency

## 2017-07-20 NOTE — PHYSICAL THERAPY INITIAL EVALUATION ADULT - ADDITIONAL COMMENTS
As per patient, lives c daughter in private house without stair steps to enter. Has HHA. Used a cane to ambulate indoors and outdoors c supervision.

## 2017-07-20 NOTE — PROGRESS NOTE ADULT - ASSESSMENT
Likely resolving ileus. Suggest: begin clear fluids and monitor tolerance. If any nausea/vomiting will need repeat imaging - likely abd CT with p.o. contrast

## 2017-07-20 NOTE — PROGRESS NOTE ADULT - PROBLEM SELECTOR PLAN 3
Non-pathognomonic for myocardial infarction at this time.   Patient not having any chest pain at present.    Troponin down trending, no acute findings in EKG  Echo pending.   Continue beta blocker.  c/w telemetry Non-pathognomonic for myocardial infarction at this time.   Patient not having any chest pain at present.    Troponin down trending, no acute findings in EKG  Echo: normal EF  Continue beta blocker.  can d/c telemetry

## 2017-07-20 NOTE — PHYSICAL THERAPY INITIAL EVALUATION ADULT - GENERAL OBSERVATIONS, REHAB EVAL
Patient encountered supine in bed, vital signs as charted. Attachments: cardiac monitor, IV fluid. AAOx4. [facility], intelligible english. Reports generalized abdominal pain (unable to rate); denies shortness of breath. Chest Auscultation: clear breathsounds throughout. Patient encountered supine in bed, vital signs as charted. Attachments: cardiac monitor, IV fluid. AAOx4. Declined [facility] services, intelligible english. Reports generalized abdominal pain (unable to rate); denies shortness of breath. Chest Auscultation: clear breathsounds throughout.

## 2017-07-20 NOTE — PHYSICAL THERAPY INITIAL EVALUATION ADULT - IMPAIRMENTS FOUND, PT EVAL
aerobic capacity/endurance/ROM/neuromotor development and sensory integration/ventilation and respiration/gas exchange/posture/joint integrity and mobility/gait, locomotion, and balance/muscle strength

## 2017-07-20 NOTE — PROGRESS NOTE ADULT - SUBJECTIVE AND OBJECTIVE BOX
Minimal NG output overnight. Patient self-discontinued her NG tube this AM. Has passed flatus but no BM. No C/O abd pain.    O/E abd is softly distended and completely non-tender.

## 2017-07-20 NOTE — PROGRESS NOTE ADULT - ASSESSMENT
82 y/o woman with history colon resection for Ca, presents with diffuse abdominal pain with abdominal distention;  CT shows at least partial SBO. She had an episode of vertigo, head CT shows mastoid effusion. + UTI, mild troponin release. 82 y/o woman with history colon resection for Ca, presents with diffuse abdominal pain with abdominal distention;  CT shows at least partial SBO. NGT now out. head CT shows mastoid effusion. + UTI, mild troponin release. Echo: normal EF

## 2017-07-21 ENCOUNTER — TRANSCRIPTION ENCOUNTER (OUTPATIENT)
Age: 82
End: 2017-07-21

## 2017-07-21 PROBLEM — Z00.00 ENCOUNTER FOR PREVENTIVE HEALTH EXAMINATION: Status: ACTIVE | Noted: 2017-07-21

## 2017-07-21 LAB
ALBUMIN SERPL ELPH-MCNC: 2.8 G/DL — LOW (ref 3.3–5)
ALP SERPL-CCNC: 80 U/L — SIGNIFICANT CHANGE UP (ref 40–120)
ALT FLD-CCNC: 50 U/L — SIGNIFICANT CHANGE UP (ref 12–78)
ANION GAP SERPL CALC-SCNC: 10 MMOL/L — SIGNIFICANT CHANGE UP (ref 5–17)
AST SERPL-CCNC: 27 U/L — SIGNIFICANT CHANGE UP (ref 15–37)
BILIRUB SERPL-MCNC: 1.1 MG/DL — SIGNIFICANT CHANGE UP (ref 0.2–1.2)
BUN SERPL-MCNC: 10 MG/DL — SIGNIFICANT CHANGE UP (ref 7–23)
CALCIUM SERPL-MCNC: 9.2 MG/DL — SIGNIFICANT CHANGE UP (ref 8.5–10.1)
CHLORIDE SERPL-SCNC: 110 MMOL/L — HIGH (ref 96–108)
CO2 SERPL-SCNC: 25 MMOL/L — SIGNIFICANT CHANGE UP (ref 22–31)
CREAT SERPL-MCNC: 0.89 MG/DL — SIGNIFICANT CHANGE UP (ref 0.5–1.3)
GLUCOSE SERPL-MCNC: 85 MG/DL — SIGNIFICANT CHANGE UP (ref 70–99)
HCT VFR BLD CALC: 31.4 % — LOW (ref 34.5–45)
HGB BLD-MCNC: 10.2 G/DL — LOW (ref 11.5–15.5)
MAGNESIUM SERPL-MCNC: 1.7 MG/DL — SIGNIFICANT CHANGE UP (ref 1.6–2.6)
MCHC RBC-ENTMCNC: 30.3 PG — SIGNIFICANT CHANGE UP (ref 27–34)
MCHC RBC-ENTMCNC: 32.3 GM/DL — SIGNIFICANT CHANGE UP (ref 32–36)
MCV RBC AUTO: 93.9 FL — SIGNIFICANT CHANGE UP (ref 80–100)
PHOSPHATE SERPL-MCNC: 2.3 MG/DL — LOW (ref 2.5–4.5)
PLATELET # BLD AUTO: 244 K/UL — SIGNIFICANT CHANGE UP (ref 150–400)
POTASSIUM SERPL-MCNC: 3.8 MMOL/L — SIGNIFICANT CHANGE UP (ref 3.5–5.3)
POTASSIUM SERPL-SCNC: 3.8 MMOL/L — SIGNIFICANT CHANGE UP (ref 3.5–5.3)
PROT SERPL-MCNC: 6.1 GM/DL — SIGNIFICANT CHANGE UP (ref 6–8.3)
RBC # BLD: 3.35 M/UL — LOW (ref 3.8–5.2)
RBC # FLD: 13 % — SIGNIFICANT CHANGE UP (ref 11–15)
SODIUM SERPL-SCNC: 145 MMOL/L — SIGNIFICANT CHANGE UP (ref 135–145)
WBC # BLD: 4 K/UL — SIGNIFICANT CHANGE UP (ref 3.8–10.5)
WBC # FLD AUTO: 4 K/UL — SIGNIFICANT CHANGE UP (ref 3.8–10.5)

## 2017-07-21 PROCEDURE — 99233 SBSQ HOSP IP/OBS HIGH 50: CPT

## 2017-07-21 PROCEDURE — 99232 SBSQ HOSP IP/OBS MODERATE 35: CPT

## 2017-07-21 RX ORDER — MAGNESIUM OXIDE 400 MG ORAL TABLET 241.3 MG
400 TABLET ORAL
Qty: 0 | Refills: 0 | Status: COMPLETED | OUTPATIENT
Start: 2017-07-21 | End: 2017-07-21

## 2017-07-21 RX ORDER — POTASSIUM PHOSPHATE, MONOBASIC POTASSIUM PHOSPHATE, DIBASIC 236; 224 MG/ML; MG/ML
15 INJECTION, SOLUTION INTRAVENOUS ONCE
Qty: 0 | Refills: 0 | Status: COMPLETED | OUTPATIENT
Start: 2017-07-21 | End: 2017-07-21

## 2017-07-21 RX ADMIN — MAGNESIUM OXIDE 400 MG ORAL TABLET 400 MILLIGRAM(S): 241.3 TABLET ORAL at 14:48

## 2017-07-21 RX ADMIN — SODIUM CHLORIDE 100 MILLILITER(S): 9 INJECTION INTRAMUSCULAR; INTRAVENOUS; SUBCUTANEOUS at 11:02

## 2017-07-21 RX ADMIN — HEPARIN SODIUM 5000 UNIT(S): 5000 INJECTION INTRAVENOUS; SUBCUTANEOUS at 17:07

## 2017-07-21 RX ADMIN — POTASSIUM PHOSPHATE, MONOBASIC POTASSIUM PHOSPHATE, DIBASIC 63.75 MILLIMOLE(S): 236; 224 INJECTION, SOLUTION INTRAVENOUS at 11:01

## 2017-07-21 RX ADMIN — MAGNESIUM OXIDE 400 MG ORAL TABLET 400 MILLIGRAM(S): 241.3 TABLET ORAL at 17:07

## 2017-07-21 RX ADMIN — AMLODIPINE BESYLATE 10 MILLIGRAM(S): 2.5 TABLET ORAL at 06:35

## 2017-07-21 RX ADMIN — LISINOPRIL 20 MILLIGRAM(S): 2.5 TABLET ORAL at 06:35

## 2017-07-21 NOTE — PROGRESS NOTE ADULT - SUBJECTIVE AND OBJECTIVE BOX
Patient is a 81y old  Female who presents with a chief complaint of Abdominal pain for several days. (18 Jul 2017 06:41)      PAST MEDICAL & SURGICAL HISTORY:  Colon cancer  HTN (hypertension)  History of cholecystectomy  S/P colon resection      INTERVAL HISTORY: resting in bed, not in any acute distress  	  MEDICATIONS:  MEDICATIONS  (STANDING):  ATENolol  Tablet 25 milliGRAM(s) Oral daily  heparin  Injectable 5000 Unit(s) SubCutaneous every 12 hours  sodium chloride 0.9%. 1000 milliLiter(s) (100 mL/Hr) IV Continuous <Continuous>  amLODIPine   Tablet 10 milliGRAM(s) Oral daily  lisinopril 20 milliGRAM(s) Oral daily  potassium phosphate IVPB 15 milliMole(s) IV Intermittent once    MEDICATIONS  (PRN):  morphine  - Injectable 2 milliGRAM(s) IV Push every 6 hours PRN Severe Pain (7 - 10)  ondansetron Injectable 4 milliGRAM(s) IV Push every 6 hours PRN Nausea and/or Vomiting      Vitals:  T(F): 98.2 (07-21-17 @ 05:36), Max: 98.6 (07-20-17 @ 11:02)  HR: 51 (07-21-17 @ 05:36) (46 - 54)  BP: 134/66 (07-21-17 @ 05:36) (119/48 - 135/57)  RR: 18 (07-21-17 @ 05:36) (17 - 18)  SpO2: 95% (07-21-17 @ 05:36) (95% - 100%)  Wt(kg): --    Weight (kg): 79.8 (07-18 @ 11:08)  BMI (kg/m2): 32.2 (07-18 @ 11:08)      PHYSICAL EXAM:  Neuro: Awake, responsive  CV: S1 S2 RRR  Lungs: CTABL  GI: Soft, BS +, ND, NT  Extremities: No edema    radiology:   < from: CT Abdomen and Pelvis No Cont (07.18.17 @ 04:02) >  Small bowel obstruction, at least partial, transition point right lower   quadrant. No secondary signs of ischemia at this time.    < end of copied text >    DIAGNOSTIC TESTING:    [ x] Echocardiogram: < from: TTE Echo Doppler w/o Cont (07.19.17 @ 15:12) >   1. Left ventricular ejection fraction, by visual estimation, is 60 to   65%.   2. Mild mitral and tricuspid valve regurgitation.   3. Thickening of the anterior and posterior mitral valve leaflets.   4. Mild aortic and pulmonic valve regurgitation.   5. Estimated pulmonary artery systolic pressure is 37.6 mmHg assuming a   right atrial pressure of 5 mmHg, which is consistent with mild pulmonary   hypertension.    < end of copied text >    LABS:	 	    CARDIAC MARKERS:  Troponin I, Serum: .075 ng/mL (07-19 @ 03:59)  Troponin I, Serum: .089 ng/mL (07-18 @ 18:13)    21 Jul 2017 07:09    145    |  110    |  10     ----------------------------<  85     3.8     |  25     |  0.89   20 Jul 2017 07:44    147    |  115    |  13     ----------------------------<  64     4.0     |  21     |  0.93   19 Jul 2017 03:59    146    |  115    |  11     ----------------------------<  100    4.1     |  25     |  1.09     Ca    9.2        21 Jul 2017 07:09  Phos  2.3       21 Jul 2017 07:09  Mg     1.7       21 Jul 2017 07:09    TPro  6.1    /  Alb  2.8    /  TBili  1.1    /  DBili  x      /  AST  27     /  ALT  50     /  AlkPhos  80     21 Jul 2017 07:09                          10.2   4.0   )-----------( 244      ( 21 Jul 2017 07:09 )             31.4 ,                       10.7   5.4   )-----------( 176      ( 20 Jul 2017 07:44 )             31.0 ,                       10.6   4.8   )-----------( 253      ( 19 Jul 2017 04:11 )             29.6     Lipid Profile: 07-18 @ 14:54  HDL/Total Cholesterol: 5.7 RATIO  HDL Chol:              31 mg/dL  Serum Chol:            177 mg/dL  Direct LDL:            109 mg/dL  Triglycerides:         185 mg/dL    TSH: Thyroid Stimulating Hormone, Serum: 2.240 uU/mL (07-18 @ 09:47) Patient is a 81y old  Female who presents with a chief complaint of Abdominal pain for several days. (18 Jul 2017 06:41)      PAST MEDICAL & SURGICAL HISTORY:  Colon cancer  HTN (hypertension)  History of cholecystectomy  S/P colon resection      INTERVAL HISTORY: resting in bed, not in any acute distress, denies any chest pain or sob, tolerating clear liquids, no c/o nausea , vomiting or abdominal pain.  	  MEDICATIONS:  MEDICATIONS  (STANDING):  ATENolol  Tablet 25 milliGRAM(s) Oral daily  heparin  Injectable 5000 Unit(s) SubCutaneous every 12 hours  sodium chloride 0.9%. 1000 milliLiter(s) (100 mL/Hr) IV Continuous <Continuous>  amLODIPine   Tablet 10 milliGRAM(s) Oral daily  lisinopril 20 milliGRAM(s) Oral daily  potassium phosphate IVPB 15 milliMole(s) IV Intermittent once    MEDICATIONS  (PRN):  morphine  - Injectable 2 milliGRAM(s) IV Push every 6 hours PRN Severe Pain (7 - 10)  ondansetron Injectable 4 milliGRAM(s) IV Push every 6 hours PRN Nausea and/or Vomiting      Vitals:  T(F): 98.2 (07-21-17 @ 05:36), Max: 98.6 (07-20-17 @ 11:02)  HR: 51 (07-21-17 @ 05:36) (46 - 54)  BP: 134/66 (07-21-17 @ 05:36) (119/48 - 135/57)  RR: 18 (07-21-17 @ 05:36) (17 - 18)  SpO2: 95% (07-21-17 @ 05:36) (95% - 100%)    Weight (kg): 79.8 (07-18 @ 11:08)  BMI (kg/m2): 32.2 (07-18 @ 11:08)      PHYSICAL EXAM:  Neuro: Awake, responsive  CV: S1 S2 RRR  Lungs: CTABL  GI: Soft, BS +, ND, NT  Extremities: No edema    radiology:   < from: CT Abdomen and Pelvis No Cont (07.18.17 @ 04:02) >  Small bowel obstruction, at least partial, transition point right lower   quadrant. No secondary signs of ischemia at this time.    < end of copied text >    DIAGNOSTIC TESTING:    [ x] Echocardiogram: < from: TTE Echo Doppler w/o Cont (07.19.17 @ 15:12) >   1. Left ventricular ejection fraction, by visual estimation, is 60 to   65%.   2. Mild mitral and tricuspid valve regurgitation.   3. Thickening of the anterior and posterior mitral valve leaflets.   4. Mild aortic and pulmonic valve regurgitation.   5. Estimated pulmonary artery systolic pressure is 37.6 mmHg assuming a   right atrial pressure of 5 mmHg, which is consistent with mild pulmonary   hypertension.    < end of copied text >    LABS:	 	    CARDIAC MARKERS:  Troponin I, Serum: .075 ng/mL (07-19 @ 03:59)  Troponin I, Serum: .089 ng/mL (07-18 @ 18:13)    21 Jul 2017 07:09    145    |  110    |  10     ----------------------------<  85     3.8     |  25     |  0.89   20 Jul 2017 07:44    147    |  115    |  13     ----------------------------<  64     4.0     |  21     |  0.93   19 Jul 2017 03:59    146    |  115    |  11     ----------------------------<  100    4.1     |  25     |  1.09     Ca    9.2        21 Jul 2017 07:09  Phos  2.3       21 Jul 2017 07:09  Mg     1.7       21 Jul 2017 07:09    TPro  6.1    /  Alb  2.8    /  TBili  1.1    /  DBili  x      /  AST  27     /  ALT  50     /  AlkPhos  80     21 Jul 2017 07:09                          10.2   4.0   )-----------( 244      ( 21 Jul 2017 07:09 )             31.4 ,                       10.7   5.4   )-----------( 176      ( 20 Jul 2017 07:44 )             31.0 ,                       10.6   4.8   )-----------( 253      ( 19 Jul 2017 04:11 )             29.6     Lipid Profile: 07-18 @ 14:54  HDL/Total Cholesterol: 5.7 RATIO  HDL Chol:              31 mg/dL  Serum Chol:            177 mg/dL  Direct LDL:            109 mg/dL  Triglycerides:         185 mg/dL    TSH: Thyroid Stimulating Hormone, Serum: 2.240 uU/mL (07-18 @ 09:47)

## 2017-07-21 NOTE — PROGRESS NOTE ADULT - PROBLEM SELECTOR PLAN 1
Tolerating clear liquid diet, pain improved ,   surgical f/u Tolerating clear liquid diet, pain improved ,   d/c as per surgical f/u

## 2017-07-21 NOTE — PROGRESS NOTE ADULT - PROBLEM SELECTOR PLAN 1
Surgery f/u  Clear liquids  monitor electrolytes  IV hydration, pain control  No evidence of ischemia noted on CAT scan. Surgery f/u  Tolerating Clear liquids  monitor electrolytes  IV hydration, pain control  No evidence of ischemia noted on CAT scan.

## 2017-07-21 NOTE — PROGRESS NOTE ADULT - SUBJECTIVE AND OBJECTIVE BOX
Surgery NP note    Patient seen and examined bedside   No new complaints offered.   No Abdominal pain, Denies nausea and vomiting. Tolerating diet.  +  flatus/BM.       T(F): 98 (07-21-17 @ 15:58), Max: 98.4 (07-20-17 @ 17:35)  HR: 48 (07-21-17 @ 15:58) (46 - 53)  BP: 145/66 (07-21-17 @ 15:58) (119/48 - 145/66)  RR: 17 (07-21-17 @ 15:58) (17 - 18)  SpO2: 98% (07-21-17 @ 15:58) (95% - 98%)  Wt(kg): --  CAPILLARY BLOOD GLUCOSE          PHYSICAL EXAM:  General: NAD.   Neuro:  Alert & oriented x 3  HEENT: NCAT, EOMI, conjunctiva clear  CV: +S1+S2 regular rate and rhythm  Lung: clear to ausculation bilaterally, respirations nonlabored, good inspiratory effort  Abdomen: soft,  non tender, no distention, + bowel sounds  Extremities: no pedal edema or calf tenderness noted   : No CVA or SP tenderness    LABS:                        10.2   4.0   )-----------( 244      ( 21 Jul 2017 07:09 )             31.4     07-21    145  |  110<H>  |  10  ----------------------------<  85  3.8   |  25  |  0.89    Ca    9.2      21 Jul 2017 07:09  Phos  2.3     07-21  Mg     1.7     07-21    TPro  6.1  /  Alb  2.8<L>  /  TBili  1.1  /  DBili  x   /  AST  27  /  ALT  50  /  AlkPhos  80  07-21      I&O's Detail      Assessment: 82 y/o woman admitted with SBO, now resolving, history colon resection for Ca,     Plan:  -Advance to regular diet  -continue VTE prophylaxis with SQ heparin and SCDs  -Increase activity with PT, OOB, Ambulate  -Patient instructed on and encouraged incentive spirometry use  -f/u AM labs  -will discuss with surgical attending for Recommendations

## 2017-07-21 NOTE — PROGRESS NOTE ADULT - ASSESSMENT
82 y/o woman with history colon resection for Ca, presents with diffuse abdominal pain with abdominal distention;  CT shows at least partial SBO. Tolerating clear liquids now. head CT shows mastoid effusion. + UTI, mild troponin release. Echo: normal EF 80 y/o woman with history colon resection for Ca, presents with diffuse abdominal pain with abdominal distention;  CT shows at least partial SBO. Now with + BM, Tolerating clear liquid. head CT shows mastoid effusion. + UTI, mild troponin release. Echo: normal EF

## 2017-07-21 NOTE — DISCHARGE NOTE ADULT - MEDICATION SUMMARY - MEDICATIONS TO TAKE
I will START or STAY ON the medications listed below when I get home from the hospital:    amlodipine-benazepril 10 mg-20 mg oral capsule  -- 1 cap(s) by mouth once a day  -- Indication: For Essential hypertension    atenolol 50 mg oral tablet  -- 1 tab(s) by mouth once a day  -- Indication: For Essential hypertension    Colace sodium 50 mg oral capsule  -- 1 cap(s) by mouth once a day  -- Medication should be taken with plenty of water.    -- Indication: For constipation    MiraLax - oral powder for reconstitution  -- 17 gram(s) by mouth once a day  -- Dilute this medication with liquid before administration.  It is very important that you take or use this exactly as directed.  Do not skip doses or discontinue unless directed by your doctor.    -- Indication: For constipation

## 2017-07-21 NOTE — DISCHARGE NOTE ADULT - HOSPITAL COURSE
80 y/o woman with history colon resection for Ca, presents with diffuse abdominal pain with abdominal distention; no nausea or vomiting, she has been passing stools. CT shows at least partial  SBO. She had an episode of vertigo, head CT shows mastoid effusion. Also noted is a UTI and elevated troponin. She denies chest pain or SOB.    Problem/Plan - 1:  ·  Problem: SBO (small bowel obstruction).  Plan: Tolerating clear liquid diet, pain resolved      Problem/Plan - 2:  ·  Problem: Essential hypertension.  Plan: , continue home medications,.     Problem/Plan - 3:  ·  Problem: Troponin level elevated.  Plan: cardio consult appreciated, , ekg does not reflect acute event. no active chest pain.   tele w no events, echo wnl.     Problem/Plan - 4:  ·  Problem: Vertigo.  Plan: given meclizine, Sx resolved; possible related to mastoid effusion.     Problem/Plan - 5:  ·  Problem: Urinary tract infection with hematuria, site unspecified.  Plan: completed abx as per ID.

## 2017-07-21 NOTE — PROGRESS NOTE ADULT - PROBLEM SELECTOR PLAN 3
Non-pathognomonic for myocardial infarction at this time.   Patient not having any chest pain at present.    Troponin down trending, no acute findings in EKG  Echo: normal EF  Continue beta blocker.

## 2017-07-21 NOTE — DISCHARGE NOTE ADULT - PATIENT PORTAL LINK FT
“You can access the [facility] Patient Portal, offered by [facility], by registering with the following website: [email or web address]”

## 2017-07-21 NOTE — PROGRESS NOTE ADULT - ATTENDING COMMENTS
Wilfred and examined this date.
Cardiovascular status stable, advancing diet as per surgery. No active cardiac issues, we'll follow only as needed.
CV stable. surgery eval continues.

## 2017-07-21 NOTE — DISCHARGE NOTE ADULT - CARE PLAN
Principal Discharge DX:	SBO (small bowel obstruction)  Goal:	f/u w pcp  Instructions for follow-up, activity and diet:	f/u with pcp, activity as tolerated  Secondary Diagnosis:	Essential hypertension  Secondary Diagnosis:	Troponin level elevated  Secondary Diagnosis:	Urinary tract infection without hematuria, site unspecified  Secondary Diagnosis:	Vertigo

## 2017-07-21 NOTE — DISCHARGE NOTE ADULT - SECONDARY DIAGNOSIS.
Essential hypertension Troponin level elevated Urinary tract infection without hematuria, site unspecified Vertigo

## 2017-07-21 NOTE — PROGRESS NOTE ADULT - SUBJECTIVE AND OBJECTIVE BOX
Patient is a 81y old  Female who presents with a chief complaint of Abdominal pain for several days. (18 Jul 2017 06:41)      INTERVAL HPI/OVERNIGHT EVENTS: No events at night, patient states she had bm and passing gas    MEDICATIONS  (STANDING):  ATENolol  Tablet 25 milliGRAM(s) Oral daily  heparin  Injectable 5000 Unit(s) SubCutaneous every 12 hours  sodium chloride 0.9%. 1000 milliLiter(s) (100 mL/Hr) IV Continuous <Continuous>  amLODIPine   Tablet 10 milliGRAM(s) Oral daily  lisinopril 20 milliGRAM(s) Oral daily  magnesium oxide 400 milliGRAM(s) Oral three times a day with meals    MEDICATIONS  (PRN):  morphine  - Injectable 2 milliGRAM(s) IV Push every 6 hours PRN Severe Pain (7 - 10)  ondansetron Injectable 4 milliGRAM(s) IV Push every 6 hours PRN Nausea and/or Vomiting      Allergies    No Known Allergies    Intolerances        REVIEW OF SYSTEMS:  CONSTITUTIONAL: No fever, weight loss, or fatigue  EYES: No eye pain, visual disturbances, or discharge  ENMT:  No difficulty hearing, tinnitus, vertigo; No sinus or throat pain  NECK: No pain or stiffness  BREASTS: No pain, masses, or nipple discharge  RESPIRATORY: No cough, wheezing, chills or hemoptysis; No shortness of breath  CARDIOVASCULAR: No chest pain, palpitations, dizziness, or leg swelling  GASTROINTESTINAL: No abdominal or epigastric pain. No nausea, vomiting, or hematemesis; No diarrhea or constipation. No melena or hematochezia.  GENITOURINARY: No dysuria, frequency, hematuria, or incontinence  NEUROLOGICAL: No headaches, memory loss, loss of strength, numbness, or tremors  SKIN: No itching, burning, rashes, or lesions   LYMPH NODES: No enlarged glands  ENDOCRINE: No heat or cold intolerance; No hair loss  MUSCULOSKELETAL: No joint pain or swelling; No muscle, back, or extremity pain  PSYCHIATRIC: No depression, anxiety, mood swings, or difficulty sleeping  HEME/LYMPH: No easy bruising, or bleeding gums  ALLERGY AND IMMUNOLOGIC: No hives or eczema    Vital Signs Last 24 Hrs  T(C): 36.8 (21 Jul 2017 12:24), Max: 36.9 (20 Jul 2017 17:35)  T(F): 98.2 (21 Jul 2017 12:24), Max: 98.4 (20 Jul 2017 17:35)  HR: 46 (21 Jul 2017 12:24) (46 - 53)  BP: 135/74 (21 Jul 2017 12:24) (119/48 - 135/74)  BP(mean): --  RR: 18 (21 Jul 2017 05:36) (18 - 18)  SpO2: 95% (21 Jul 2017 05:36) (95% - 96%)    PHYSICAL EXAM:  GENERAL: NAD, well-groomed, well-developed  HEAD:  Atraumatic, Normocephalic  EYES: EOMI, PERRLA, conjunctiva and sclera clear  ENMT: No tonsillar erythema, exudates, or enlargement; Moist mucous membranes, Good dentition, No lesions  NECK: Supple, No JVD, Normal thyroid  NERVOUS SYSTEM:  Alert & Oriented X3, Good concentration; Motor Strength 5/5 B/L upper and lower extremities; DTRs 2+ intact and symmetric  CHEST/LUNG: Clear to percussion bilaterally; No rales, rhonchi, wheezing, or rubs  HEART: Regular rate and rhythm; No murmurs, rubs, or gallops  ABDOMEN: Soft, Nontender, Nondistended; Bowel sounds present  EXTREMITIES:  2+ Peripheral Pulses, No clubbing, cyanosis, or edema  LYMPH: No lymphadenopathy noted  SKIN: No rashes or lesions    LABS:                        10.2   4.0   )-----------( 244      ( 21 Jul 2017 07:09 )             31.4     07-21    145  |  110<H>  |  10  ----------------------------<  85  3.8   |  25  |  0.89    Ca    9.2      21 Jul 2017 07:09  Phos  2.3     07-21  Mg     1.7     07-21    TPro  6.1  /  Alb  2.8<L>  /  TBili  1.1  /  DBili  x   /  AST  27  /  ALT  50  /  AlkPhos  80  07-21        CAPILLARY BLOOD GLUCOSE          RADIOLOGY & ADDITIONAL TESTS:    Imaging Personally Reviewed:  [ ] YES  [ ] NO    Consultant(s) Notes Reviewed:  [ ] YES  [ ] NO    Care Discussed with Consultants/Other Providers [ ] YES  [ ] NO

## 2017-07-22 VITALS
HEART RATE: 50 BPM | RESPIRATION RATE: 16 BRPM | TEMPERATURE: 98 F | SYSTOLIC BLOOD PRESSURE: 134 MMHG | OXYGEN SATURATION: 96 % | DIASTOLIC BLOOD PRESSURE: 57 MMHG

## 2017-07-22 PROCEDURE — 99238 HOSP IP/OBS DSCHRG MGMT 30/<: CPT

## 2017-07-22 RX ADMIN — HEPARIN SODIUM 5000 UNIT(S): 5000 INJECTION INTRAVENOUS; SUBCUTANEOUS at 05:48

## 2017-07-22 RX ADMIN — ATENOLOL 25 MILLIGRAM(S): 25 TABLET ORAL at 05:48

## 2017-07-22 RX ADMIN — AMLODIPINE BESYLATE 10 MILLIGRAM(S): 2.5 TABLET ORAL at 05:47

## 2017-07-22 RX ADMIN — LISINOPRIL 20 MILLIGRAM(S): 2.5 TABLET ORAL at 05:48

## 2017-07-22 NOTE — PROGRESS NOTE ADULT - PROBLEM SELECTOR PLAN 4
given meclizine, Sx resolved; possible related to mastoid effusion

## 2017-07-22 NOTE — PROGRESS NOTE ADULT - PROBLEM SELECTOR PLAN 5
completed abx as per ID
d/c zosyn, start ceftriaxone, f/u urine cx
on ceftriaxone, f/u culture
d/c zosyn, start ceftriaxone, f/u urine cx

## 2017-07-22 NOTE — PROGRESS NOTE ADULT - PROBLEM SELECTOR PROBLEM 3
Troponin level elevated

## 2017-07-22 NOTE — PROGRESS NOTE ADULT - PROBLEM SELECTOR PROBLEM 5
Urinary tract infection with hematuria, site unspecified

## 2017-07-22 NOTE — PROGRESS NOTE ADULT - SUBJECTIVE AND OBJECTIVE BOX
Patient is a 81y old  Female who presents with a chief complaint of Abdominal pain for several days. (18 Jul 2017 06:41)      INTERVAL HPI/OVERNIGHT EVENTS: No events at night, tolerated regular diet    MEDICATIONS  (STANDING):  ATENolol  Tablet 25 milliGRAM(s) Oral daily  heparin  Injectable 5000 Unit(s) SubCutaneous every 12 hours  sodium chloride 0.9%. 1000 milliLiter(s) (100 mL/Hr) IV Continuous <Continuous>  amLODIPine   Tablet 10 milliGRAM(s) Oral daily  lisinopril 20 milliGRAM(s) Oral daily    MEDICATIONS  (PRN):  morphine  - Injectable 2 milliGRAM(s) IV Push every 6 hours PRN Severe Pain (7 - 10)  ondansetron Injectable 4 milliGRAM(s) IV Push every 6 hours PRN Nausea and/or Vomiting        Allergies    No Known Allergies    Intolerances        REVIEW OF SYSTEMS:  CONSTITUTIONAL: No fever, weight loss, or fatigue  EYES: No eye pain, visual disturbances, or discharge  ENMT:  No difficulty hearing, tinnitus, vertigo; No sinus or throat pain  NECK: No pain or stiffness  BREASTS: No pain, masses, or nipple discharge  RESPIRATORY: No cough, wheezing, chills or hemoptysis; No shortness of breath  CARDIOVASCULAR: No chest pain, palpitations, dizziness, or leg swelling  GASTROINTESTINAL: No abdominal or epigastric pain. No nausea, vomiting, or hematemesis; No diarrhea or constipation. No melena or hematochezia.  GENITOURINARY: No dysuria, frequency, hematuria, or incontinence  NEUROLOGICAL: No headaches, memory loss, loss of strength, numbness, or tremors  SKIN: No itching, burning, rashes, or lesions   LYMPH NODES: No enlarged glands  ENDOCRINE: No heat or cold intolerance; No hair loss  MUSCULOSKELETAL: No joint pain or swelling; No muscle, back, or extremity pain  PSYCHIATRIC: No depression, anxiety, mood swings, or difficulty sleeping  HEME/LYMPH: No easy bruising, or bleeding gums  ALLERGY AND IMMUNOLOGIC: No hives or eczema      Vital Signs Last 24 Hrs  T(C): 36.4 (22 Jul 2017 05:31), Max: 36.8 (21 Jul 2017 12:24)  T(F): 97.6 (22 Jul 2017 05:31), Max: 98.2 (21 Jul 2017 12:24)  HR: 60 (22 Jul 2017 05:31) (46 - 60)  BP: 146/84 (22 Jul 2017 05:37) (135/74 - 154/59)  BP(mean): --  RR: 16 (22 Jul 2017 05:31) (16 - 18)  SpO2: 5% (22 Jul 2017 05:31) (5% - 98%)    PHYSICAL EXAM:  GENERAL: NAD, well-groomed, well-developed  HEAD:  Atraumatic, Normocephalic  EYES: EOMI, PERRLA, conjunctiva and sclera clear  ENMT: No tonsillar erythema, exudates, or enlargement; Moist mucous membranes, Good dentition, No lesions  NECK: Supple, No JVD, Normal thyroid  NERVOUS SYSTEM:  Alert & Oriented X3, Good concentration; Motor Strength 5/5 B/L upper and lower extremities; DTRs 2+ intact and symmetric  CHEST/LUNG: Clear to percussion bilaterally; No rales, rhonchi, wheezing, or rubs  HEART: Regular rate and rhythm; No murmurs, rubs, or gallops  ABDOMEN: Soft, Nontender, Nondistended; Bowel sounds present  EXTREMITIES:  2+ Peripheral Pulses, No clubbing, cyanosis, or edema  LYMPH: No lymphadenopathy noted  SKIN: No rashes or lesions    LABS:                                    10.2   4.0   )-----------( 244      ( 21 Jul 2017 07:09 )             31.4   07-21    145  |  110<H>  |  10  ----------------------------<  85  3.8   |  25  |  0.89    Ca    9.2      21 Jul 2017 07:09  Phos  2.3     07-21  Mg     1.7     07-21    TPro  6.1  /  Alb  2.8<L>  /  TBili  1.1  /  DBili  x   /  AST  27  /  ALT  50  /  AlkPhos  80  07-21      CAPILLARY BLOOD GLUCOSE          RADIOLOGY & ADDITIONAL TESTS:    Imaging Personally Reviewed:  [ ] YES  [ ] NO    Consultant(s) Notes Reviewed:  [ ] YES  [ ] NO    Care Discussed with Consultants/Other Providers [ ] YES  [ ] NO Patient is a 81y old  Female who presents with a chief complaint of Abdominal pain for several days. (18 Jul 2017 06:41)      INTERVAL HPI/OVERNIGHT EVENTS: No events at night, tolerated regular diet,     MEDICATIONS  (STANDING):  ATENolol  Tablet 25 milliGRAM(s) Oral daily  heparin  Injectable 5000 Unit(s) SubCutaneous every 12 hours  sodium chloride 0.9%. 1000 milliLiter(s) (100 mL/Hr) IV Continuous <Continuous>  amLODIPine   Tablet 10 milliGRAM(s) Oral daily  lisinopril 20 milliGRAM(s) Oral daily    MEDICATIONS  (PRN):  morphine  - Injectable 2 milliGRAM(s) IV Push every 6 hours PRN Severe Pain (7 - 10)  ondansetron Injectable 4 milliGRAM(s) IV Push every 6 hours PRN Nausea and/or Vomiting        Allergies    No Known Allergies    Intolerances        REVIEW OF SYSTEMS:  CONSTITUTIONAL: No fever, weight loss, or fatigue  EYES: No eye pain, visual disturbances, or discharge  ENMT:  No difficulty hearing, tinnitus, vertigo; No sinus or throat pain  NECK: No pain or stiffness  BREASTS: No pain, masses, or nipple discharge  RESPIRATORY: No cough, wheezing, chills or hemoptysis; No shortness of breath  CARDIOVASCULAR: No chest pain, palpitations, dizziness, or leg swelling  GASTROINTESTINAL: No abdominal or epigastric pain. No nausea, vomiting, or hematemesis; No diarrhea or constipation. No melena or hematochezia.  GENITOURINARY: No dysuria, frequency, hematuria, or incontinence  NEUROLOGICAL: No headaches, memory loss, loss of strength, numbness, or tremors  SKIN: No itching, burning, rashes, or lesions   LYMPH NODES: No enlarged glands  ENDOCRINE: No heat or cold intolerance; No hair loss  MUSCULOSKELETAL: No joint pain or swelling; No muscle, back, or extremity pain  PSYCHIATRIC: No depression, anxiety, mood swings, or difficulty sleeping  HEME/LYMPH: No easy bruising, or bleeding gums  ALLERGY AND IMMUNOLOGIC: No hives or eczema      Vital Signs Last 24 Hrs  T(C): 36.4 (22 Jul 2017 05:31), Max: 36.8 (21 Jul 2017 12:24)  T(F): 97.6 (22 Jul 2017 05:31), Max: 98.2 (21 Jul 2017 12:24)  HR: 60 (22 Jul 2017 05:31) (46 - 60)  BP: 146/84 (22 Jul 2017 05:37) (135/74 - 154/59)  BP(mean): --  RR: 16 (22 Jul 2017 05:31) (16 - 18)  SpO2: 5% (22 Jul 2017 05:31) (5% - 98%)    PHYSICAL EXAM:  GENERAL: NAD, well-groomed, well-developed  HEAD:  Atraumatic, Normocephalic  EYES: EOMI, PERRLA, conjunctiva and sclera clear  ENMT: No tonsillar erythema, exudates, or enlargement; Moist mucous membranes, Good dentition, No lesions  NECK: Supple, No JVD, Normal thyroid  NERVOUS SYSTEM:  Alert & Oriented X3, Good concentration; Motor Strength 5/5 B/L upper and lower extremities; DTRs 2+ intact and symmetric  CHEST/LUNG: Clear to percussion bilaterally; No rales, rhonchi, wheezing, or rubs  HEART: Regular rate and rhythm; No murmurs, rubs, or gallops  ABDOMEN: Soft, Nontender, Nondistended; Bowel sounds present  EXTREMITIES:  2+ Peripheral Pulses, No clubbing, cyanosis, or edema  LYMPH: No lymphadenopathy noted  SKIN: No rashes or lesions    LABS:                                    10.2   4.0   )-----------( 244      ( 21 Jul 2017 07:09 )             31.4   07-21    145  |  110<H>  |  10  ----------------------------<  85  3.8   |  25  |  0.89    Ca    9.2      21 Jul 2017 07:09  Phos  2.3     07-21  Mg     1.7     07-21    TPro  6.1  /  Alb  2.8<L>  /  TBili  1.1  /  DBili  x   /  AST  27  /  ALT  50  /  AlkPhos  80  07-21      CAPILLARY BLOOD GLUCOSE          RADIOLOGY & ADDITIONAL TESTS:    Imaging Personally Reviewed:  [ ] YES  [ ] NO    Consultant(s) Notes Reviewed:  [ ] YES  [ ] NO    Care Discussed with Consultants/Other Providers [ ] YES  [ ] NO

## 2017-07-22 NOTE — PROGRESS NOTE ADULT - PROBLEM SELECTOR PROBLEM 1
SBO (small bowel obstruction)

## 2017-07-23 LAB
CULTURE RESULTS: SIGNIFICANT CHANGE UP
CULTURE RESULTS: SIGNIFICANT CHANGE UP
SPECIMEN SOURCE: SIGNIFICANT CHANGE UP
SPECIMEN SOURCE: SIGNIFICANT CHANGE UP

## 2017-09-23 NOTE — H&P ADULT - PROBLEM SELECTOR PROBLEM 4
Patient here for right 1st finger injury yesterday during Volleyball. Finger hit metal pole and floor.   Vertigo

## 2021-01-21 NOTE — PHYSICAL THERAPY INITIAL EVALUATION ADULT - SITTING BALANCE: DYNAMIC

 

Volpara breast density b.

 

Digital Woman Screen Mammo: January 21, 2021 - Exam #: 

[id number]

Bilateral CC and MLO view(s) were taken.

 

Technologist: Sona Long, Technologist

Prior study comparison: January 17, 2020, bilateral digital mammo

screening bilat, performed at [facility].  June 21, 2018, bilateral digital mammo screening bilat, performed at 

[facility].

 

FINDINGS: There are scattered fibroglandular densities.  There 

has been no change in the appearance of the mammogram from the 

prior studies.  There is a mild amount of residual fibroglandular

tissue which is fairly symmetric. There is no interval 

development of dominant mass, architectural distortion, or 

clustered microcalcification suggestive of malignancy.

 

Assessment: BI-RADS/ACR category 1 mammogram. Negative Mammogram.

 

Recommendation

Routine screening mammogram in 1 year (for women over age 40).

This mammogram was interpreted with the aid of an FDA-approved 

computer-aided dectection system.

 

Electronically Signed By: Tai Martinez MD 01/21/21 0941 normal balance

## 2021-06-01 NOTE — PATIENT PROFILE ADULT. - NS TRANSFER PATIENT BELONGINGS
None Abdominal Pain    Many things can cause abdominal pain. . Your health care provider will do a physical exam to determine if there is a dangerous cause of your pain; blood tests and imaging can sometimes help determine the cause of your pain. However, in many cases, no cause may be found and you may need further testing as an outpatient. Monitor your abdominal pain for any changes.     SEEK IMMEDIATE MEDICAL CARE IF YOU HAVE ANY OF THE FOLLOWING SYMPTOMS: worsening abdominal pain, uncontrollable vomiting, profuse diarrhea, inability to have bowel movements or pass gas, black or bloody stools, fever accompanying chest pain or back pain, or fainting. These symptoms may represent a serious problem that is an emergency. Do not wait to see if the symptoms will go away. Get medical help right away. Call 911 and do not drive yourself to the hospital.

## 2022-04-10 ENCOUNTER — EMERGENCY (EMERGENCY)
Facility: HOSPITAL | Age: 87
LOS: 1 days | Discharge: HOME | End: 2022-04-10
Attending: EMERGENCY MEDICINE | Admitting: EMERGENCY MEDICINE
Payer: MEDICARE

## 2022-04-10 VITALS
SYSTOLIC BLOOD PRESSURE: 175 MMHG | HEIGHT: 62 IN | HEART RATE: 43 BPM | OXYGEN SATURATION: 100 % | RESPIRATION RATE: 16 BRPM | TEMPERATURE: 97 F | DIASTOLIC BLOOD PRESSURE: 50 MMHG

## 2022-04-10 VITALS
SYSTOLIC BLOOD PRESSURE: 150 MMHG | TEMPERATURE: 97 F | DIASTOLIC BLOOD PRESSURE: 69 MMHG | OXYGEN SATURATION: 100 % | RESPIRATION RATE: 15 BRPM | HEART RATE: 70 BPM

## 2022-04-10 DIAGNOSIS — Z90.49 ACQUIRED ABSENCE OF OTHER SPECIFIED PARTS OF DIGESTIVE TRACT: Chronic | ICD-10-CM

## 2022-04-10 LAB
ALBUMIN SERPL ELPH-MCNC: 4.2 G/DL — SIGNIFICANT CHANGE UP (ref 3.3–5)
ALP SERPL-CCNC: 60 U/L — SIGNIFICANT CHANGE UP (ref 40–120)
ALT FLD-CCNC: 12 U/L — SIGNIFICANT CHANGE UP (ref 4–33)
ANION GAP SERPL CALC-SCNC: 10 MMOL/L — SIGNIFICANT CHANGE UP (ref 7–14)
APTT BLD: 30.3 SEC — SIGNIFICANT CHANGE UP (ref 27–36.3)
AST SERPL-CCNC: 17 U/L — SIGNIFICANT CHANGE UP (ref 4–32)
BASOPHILS # BLD AUTO: 0.02 K/UL — SIGNIFICANT CHANGE UP (ref 0–0.2)
BASOPHILS NFR BLD AUTO: 0.3 % — SIGNIFICANT CHANGE UP (ref 0–2)
BILIRUB SERPL-MCNC: 0.9 MG/DL — SIGNIFICANT CHANGE UP (ref 0.2–1.2)
BLD GP AB SCN SERPL QL: NEGATIVE — SIGNIFICANT CHANGE UP
BLOOD GAS VENOUS COMPREHENSIVE RESULT: SIGNIFICANT CHANGE UP
BUN SERPL-MCNC: 12 MG/DL — SIGNIFICANT CHANGE UP (ref 7–23)
CALCIUM SERPL-MCNC: 10.1 MG/DL — SIGNIFICANT CHANGE UP (ref 8.4–10.5)
CHLORIDE SERPL-SCNC: 108 MMOL/L — HIGH (ref 98–107)
CO2 SERPL-SCNC: 23 MMOL/L — SIGNIFICANT CHANGE UP (ref 22–31)
CREAT SERPL-MCNC: 1.26 MG/DL — SIGNIFICANT CHANGE UP (ref 0.5–1.3)
EGFR: 42 ML/MIN/1.73M2 — LOW
EOSINOPHIL # BLD AUTO: 0.05 K/UL — SIGNIFICANT CHANGE UP (ref 0–0.5)
EOSINOPHIL NFR BLD AUTO: 0.8 % — SIGNIFICANT CHANGE UP (ref 0–6)
FLUAV AG NPH QL: SIGNIFICANT CHANGE UP
FLUBV AG NPH QL: SIGNIFICANT CHANGE UP
GLUCOSE SERPL-MCNC: 97 MG/DL — SIGNIFICANT CHANGE UP (ref 70–99)
HCT VFR BLD CALC: 35.4 % — SIGNIFICANT CHANGE UP (ref 34.5–45)
HGB BLD-MCNC: 11.5 G/DL — SIGNIFICANT CHANGE UP (ref 11.5–15.5)
IANC: 3.88 K/UL — SIGNIFICANT CHANGE UP (ref 1.8–7.4)
IMM GRANULOCYTES NFR BLD AUTO: 0.3 % — SIGNIFICANT CHANGE UP (ref 0–1.5)
INR BLD: 1.06 RATIO — SIGNIFICANT CHANGE UP (ref 0.88–1.16)
LIDOCAIN IGE QN: 22 U/L — SIGNIFICANT CHANGE UP (ref 7–60)
LYMPHOCYTES # BLD AUTO: 1.44 K/UL — SIGNIFICANT CHANGE UP (ref 1–3.3)
LYMPHOCYTES # BLD AUTO: 24.4 % — SIGNIFICANT CHANGE UP (ref 13–44)
MCHC RBC-ENTMCNC: 29.3 PG — SIGNIFICANT CHANGE UP (ref 27–34)
MCHC RBC-ENTMCNC: 32.5 GM/DL — SIGNIFICANT CHANGE UP (ref 32–36)
MCV RBC AUTO: 90.3 FL — SIGNIFICANT CHANGE UP (ref 80–100)
MONOCYTES # BLD AUTO: 0.5 K/UL — SIGNIFICANT CHANGE UP (ref 0–0.9)
MONOCYTES NFR BLD AUTO: 8.5 % — SIGNIFICANT CHANGE UP (ref 2–14)
NEUTROPHILS # BLD AUTO: 3.88 K/UL — SIGNIFICANT CHANGE UP (ref 1.8–7.4)
NEUTROPHILS NFR BLD AUTO: 65.7 % — SIGNIFICANT CHANGE UP (ref 43–77)
NRBC # BLD: 0 /100 WBCS — SIGNIFICANT CHANGE UP
NRBC # FLD: 0 K/UL — SIGNIFICANT CHANGE UP
PLATELET # BLD AUTO: 300 K/UL — SIGNIFICANT CHANGE UP (ref 150–400)
POTASSIUM SERPL-MCNC: 4.3 MMOL/L — SIGNIFICANT CHANGE UP (ref 3.5–5.3)
POTASSIUM SERPL-SCNC: 4.3 MMOL/L — SIGNIFICANT CHANGE UP (ref 3.5–5.3)
PROT SERPL-MCNC: 6.6 G/DL — SIGNIFICANT CHANGE UP (ref 6–8.3)
PROTHROM AB SERPL-ACNC: 12.3 SEC — SIGNIFICANT CHANGE UP (ref 10.5–13.4)
RBC # BLD: 3.92 M/UL — SIGNIFICANT CHANGE UP (ref 3.8–5.2)
RBC # FLD: 14.4 % — SIGNIFICANT CHANGE UP (ref 10.3–14.5)
RH IG SCN BLD-IMP: POSITIVE — SIGNIFICANT CHANGE UP
RSV RNA NPH QL NAA+NON-PROBE: SIGNIFICANT CHANGE UP
SARS-COV-2 RNA SPEC QL NAA+PROBE: SIGNIFICANT CHANGE UP
SODIUM SERPL-SCNC: 141 MMOL/L — SIGNIFICANT CHANGE UP (ref 135–145)
TROPONIN T, HIGH SENSITIVITY RESULT: 18 NG/L — SIGNIFICANT CHANGE UP
TROPONIN T, HIGH SENSITIVITY RESULT: 25 NG/L — SIGNIFICANT CHANGE UP
WBC # BLD: 5.91 K/UL — SIGNIFICANT CHANGE UP (ref 3.8–10.5)
WBC # FLD AUTO: 5.91 K/UL — SIGNIFICANT CHANGE UP (ref 3.8–10.5)

## 2022-04-10 PROCEDURE — 71045 X-RAY EXAM CHEST 1 VIEW: CPT | Mod: 26

## 2022-04-10 PROCEDURE — 74177 CT ABD & PELVIS W/CONTRAST: CPT | Mod: 26,MA

## 2022-04-10 PROCEDURE — 99285 EMERGENCY DEPT VISIT HI MDM: CPT | Mod: GC

## 2022-04-10 PROCEDURE — 99285 EMERGENCY DEPT VISIT HI MDM: CPT

## 2022-04-10 RX ORDER — MORPHINE SULFATE 50 MG/1
4 CAPSULE, EXTENDED RELEASE ORAL ONCE
Refills: 0 | Status: DISCONTINUED | OUTPATIENT
Start: 2022-04-10 | End: 2022-04-10

## 2022-04-10 RX ORDER — SODIUM CHLORIDE 9 MG/ML
1000 INJECTION INTRAMUSCULAR; INTRAVENOUS; SUBCUTANEOUS ONCE
Refills: 0 | Status: COMPLETED | OUTPATIENT
Start: 2022-04-10 | End: 2022-04-10

## 2022-04-10 RX ORDER — ONDANSETRON 8 MG/1
4 TABLET, FILM COATED ORAL ONCE
Refills: 0 | Status: COMPLETED | OUTPATIENT
Start: 2022-04-10 | End: 2022-04-10

## 2022-04-10 RX ADMIN — MORPHINE SULFATE 4 MILLIGRAM(S): 50 CAPSULE, EXTENDED RELEASE ORAL at 10:15

## 2022-04-10 RX ADMIN — ONDANSETRON 4 MILLIGRAM(S): 8 TABLET, FILM COATED ORAL at 09:36

## 2022-04-10 RX ADMIN — SODIUM CHLORIDE 1000 MILLILITER(S): 9 INJECTION INTRAMUSCULAR; INTRAVENOUS; SUBCUTANEOUS at 09:37

## 2022-04-10 RX ADMIN — MORPHINE SULFATE 4 MILLIGRAM(S): 50 CAPSULE, EXTENDED RELEASE ORAL at 09:36

## 2022-04-10 NOTE — ED PROVIDER NOTE - PROGRESS NOTE DETAILS
Joseph Frankel PGY3: CT with dilated loops of bowel. Surgery consulted for possible SBO although no transition point. Surgery does not think patient is obstructed. They have cleared her from their standpoint. As patient with no known etiology at this point of CT findings, admission was offered, however patient would like to go home and follow up with GI. Patient tolerating PO. Patient gave urine sample but daughter put it into a culture and threw out the rest. As patient with no urinary symptoms and no signs of pyelo/cysititis on CT will send culture and follow up if positive. Will dc with GI follow up. Discussed plan and return precautions with patient and daughter who understands and agrees. All questions answered. Joseph Frankel PGY3: CT with dilated loops of bowel. Surgery consulted for possible SBO although no transition point. Surgery does not think patient is obstructed. They have cleared her from their standpoint. As patient with no known etiology at this point of CT findings, admission was offered, however patient would like to go home and follow up with GI. Patient feeling much improved and has not required any further pain medication. Patient with no abd tenderness on reexam. Patient tolerating PO. Patient gave urine sample but daughter put it into a culture and threw out the rest. As patient with no urinary symptoms and no signs of pyelo/cysititis on CT will send culture and follow up if positive. Will dc with GI follow up. Discussed plan and return precautions with patient and daughter who understands and agrees. All questions answered.

## 2022-04-10 NOTE — ED PROVIDER NOTE - PATIENT PORTAL LINK FT
You can access the FollowMyHealth Patient Portal offered by [facility] by registering at the following website: [email or web address]. By joining [employer]’s FollowMyHealth portal, you will also be able to view your health information using other applications (apps) compatible with our system.

## 2022-04-10 NOTE — ED PROVIDER NOTE - CLINICAL SUMMARY MEDICAL DECISION MAKING FREE TEXT BOX
86f h/o colon ca s/p resection and sbo in past, presents to ed for severe constant abd pain since last night a/w nausea. On exam is saw cardic, otherwise vs ok, nontoxic, ao3, unlabored breathing, abd diffusely tender. Patient endorses h/o known bradycardia and abnl ekg-when todays compared to prior appears unchanged. Given complaints will check labs, ct a/p, urine, pain control, hydrate-eval for diff including but not limited to elec disturbances, organ dysfunction, infection, obstruction, monitor and reass.

## 2022-04-10 NOTE — ED PROVIDER NOTE - CHIEF COMPLAINT
The patient is a 86y Female complaining of  The patient is a 86y Female complaining of abdominal pain.

## 2022-04-10 NOTE — CONSULT NOTE ADULT - SUBJECTIVE AND OBJECTIVE BOX
HPI: 86y F PMH HTN, Colon Cancer s/p prior subtotal resection presents with 1 day of worsening abdominal pain. Patient reports pain started around 9PM last night, sharp, non-radiating, localized to the upper abdomen. Patient endorses some nausea, denies fevers, chills, recent sick contacts, headache, chest pain, SOB, diarrhea, dysuria, hematuria. Patient has had similar pain in the past and was hospitalized previously for non-operative management of an SBO, and reports this pain feels similar to when she had an SBO. General Surgery consulted for further recommendations. Patient reports last BM yesterday and last gas this morning.     Interview conducted using [email or web address] Deanna Creole  ID#[id number] and patient's daughter, Nelda, at bedside.       PAST MEDICAL & SURGICAL HISTORY:  HTN (hypertension)    Colon cancer    S/P colon resection    History of cholecystectomy        Allergies    No Known Allergies    Intolerances        SOCIAL HISTORY: Lives with daughter    FAMILY HISTORY:  No pertinent family history in first degree relatives        Physical Exam:  General: NAD, resting comfortably  Neuro: A/O x 3, CNs II-XII grossly intact, normal sensation, no focal deficits  HEENT: NC/AT, EOMI, normal hearing  Pulmonary: normal resp effort on RA  Cardiovascular: RRR  Abdominal: soft, mild tenderness to palpation in epigastrum, negative Parrish's sign,  well-healed RUQ and midline abdominal scars noted  Extremities: WWP      Vital Signs Last 24 Hrs  T(C): 36.1 (10 Apr 2022 18:41), Max: 36.7 (10 Apr 2022 09:43)  T(F): 97 (10 Apr 2022 18:41), Max: 98 (10 Apr 2022 09:43)  HR: 70 (10 Apr 2022 18:41) (43 - 70)  BP: 150/69 (10 Apr 2022 18:41) (148/65 - 175/50)  BP(mean): --  RR: 15 (10 Apr 2022 18:41) (15 - 17)  SpO2: 100% (10 Apr 2022 18:41) (100% - 100%)    I&O's Summary          LABS:                        11.5   5.91  )-----------( 300      ( 10 Apr 2022 09:55 )             35.4     04-10    141  |  108<H>  |  12  ----------------------------<  97  4.3   |  23  |  1.26    Ca    10.1      10 Apr 2022 09:55    TPro  6.6  /  Alb  4.2  /  TBili  0.9  /  DBili  x   /  AST  17  /  ALT  12  /  AlkPhos  60  04-10    PT/INR - ( 10 Apr 2022 09:55 )   PT: 12.3 sec;   INR: 1.06 ratio         PTT - ( 10 Apr 2022 09:55 )  PTT:30.3 sec    CAPILLARY BLOOD GLUCOSE        LIVER FUNCTIONS - ( 10 Apr 2022 09:55 )  Alb: 4.2 g/dL / Pro: 6.6 g/dL / ALK PHOS: 60 U/L / ALT: 12 U/L / AST: 17 U/L / GGT: x               RADIOLOGY & ADDITIONAL STUDIES:  < from: CT Abdomen and Pelvis w/ IV Cont (04.10.22 @ 12:04) >  FINDINGS:  LOWER CHEST: Cardiomegaly. Bibasilar subsegmental atelectasis.    LIVER: Within normal limits.  BILE DUCTS: CBD measures up to 1.2 cm, which may reflect   postcholecystectomy state.  GALLBLADDER: Status post cholecystectomy.  SPLEEN: Within normal limits.  PANCREAS: Within normal limits.  ADRENALS: Within normal limits.  KIDNEYS/URETERS: Bilateral renal cysts. No hydronephrosis.    BLADDER: Within normal limits.  REPRODUCTIVE ORGANS: Small 1.0 cm subserosal fibroid.    BOWEL: Mild mural wall thickening of the distal stomach/proximal   duodenum. Dilated and thickened loops of small bowel with air-fluid   levels without discrete transition point identified. Distal loops of   small bowel are decreased in caliber. Status post appendectomy. The large   bowel appears normal in caliber.  PERITONEUM: Mild free fluid in the left hemipelvis..  VESSELS: Atherosclerotic changes.  RETROPERITONEUM/LYMPH NODES: No lymphadenopathy.  ABDOMINAL WALL:There are bilateral fat-containing inguinal hernias.   Tissue is identified in the left inguinal hernia which may represent left   adnexa.  BONES: Degenerative changes.    IMPRESSION:  Dilated and thickened loops of small bowel with air-fluid levels without   discrete transition point. There are decreased caliber distal bowel   loops. Findings suggest an infectious, inflammatory or potentially   ischemic etiology rather than small bowel obstruction. Gas and stool seen   throughout the colon and rectum.    Mild mural wall thickening of the distal stomach/proximal duodenum.   Findings may represent gastritis/duodenitis.    < end of copied text >

## 2022-04-10 NOTE — ED PROVIDER NOTE - NSFOLLOWUPINSTRUCTIONS_ED_ALL_ED_FT
Please follow up with a gastroenterologist for your pain. Please bring your attached results.     Please also follow up with your surgeon that performed your surgery.    You may use tylenol for pain. Please do not exceed 3250 mg in 24 hours.     Please see general info below regarding abdominal pain.    Abdominal Pain    Many things can cause abdominal pain. Many times, abdominal pain is not caused by a disease and will improve without treatment. Your health care provider will do a physical exam to determine if there is a dangerous cause of your pain; blood tests and imaging may help determine the cause of your pain. However, in many cases, no cause may be found and you may need further testing as an outpatient. Monitor your abdominal pain for any changes.     SEEK IMMEDIATE MEDICAL CARE IF YOU HAVE ANY OF THE FOLLOWING SYMPTOMS: worsening abdominal pain, uncontrollable vomiting, profuse diarrhea, inability to have bowel movements or pass gas, black or bloody stools, fever accompanying chest pain or back pain, or fainting. These symptoms may represent a serious problem that is an emergency. Do not wait to see if the symptoms will go away. Get medical help right away. Call 911 and do not drive yourself to the hospital.

## 2022-04-10 NOTE — ED PROVIDER NOTE - NS ED ROS FT
ROS:  GENERAL: No fever, no chills  EYES: no change in vision  HEENT: no trouble swallowing, no trouble speaking  CARDIAC: no chest pain  PULMONARY: no cough, no shortness of breath  GI: + abdominal pain,+nausea, no vomiting, no diarrhea, no constipation  : No dysuria, no frequency, no change in appearance, or odor of urine  SKIN: no rashes  NEURO: no headache, no weakness  MSK: No joint pain

## 2022-04-10 NOTE — CONSULT NOTE ADULT - ASSESSMENT
86y F PMH HTN, Colon Cancer s/p prior subtotal resection, prior SBO, presents with 1 day of worsening abdominal pain. General Surgery consulted to rule out early SBO. CT with dilated loops of bowel, thickened loops of stomach, duodenum, and other bowel, but no evidence of obstruction, thickened stomach and duodenum. Patient without evidence of obstruction at this time. Imaging and patient's presentation more consistent with inflammatory vs. infectious enteritis.     - No acute surgical intervention at this time  - Recommend GI/medical evaluation  - Will continue to follow if patient admitted    Patient discussed with attending, Dr. Wayne Olmedo MD  PGY2 Consult Resident  B Team Surgery (Acute Care Surgery)  [id number]

## 2022-04-10 NOTE — ED PROVIDER NOTE - OBJECTIVE STATEMENT
Offered language line -patient prefers daughter at bedside Nelda chung to translate.  86f presents to the ed for abd pain. Has been ongoing intermittently for years-has been evaluated for it with no acute findings. This pain became severe last night and constant, diffuse in abd, does not radiate elsewhere, a/w nausea. No fevers, chills, ha, cp, sob, diarrhea, dysuria, hematuria. Of note patient has h/o colon CA s/p resection and sbo in past. Also has h/o abnormal ekg and slow hr.

## 2022-04-10 NOTE — CONSULT NOTE ADULT - ATTENDING COMMENTS
Patient with gastritits duodenitis no transition point inconsistent with SBO.  recommend  npo ivf  no acute intervention  gi evaluation  po trial when nausea improves    I have reviewed the history, pertinent labs and imaging, and discussed the care with the consult resident.    The active issues are:  1. gastritis    The Acute Care Surgery (B Team) Attending Group Practice:  Dr. Quyen Torre, Dr. Sony Dunne, Dr. Spencer Cornejo, Dr. Alex Black,     urgent issues - spectra [phone]  nonurgent issues - [phone]  patient appointments or afterhours - [phone]

## 2022-04-10 NOTE — ED PROVIDER NOTE - NSFOLLOWUPCLINICS_GEN_ALL_ED_FT
[facility]  Gastroenterology  [address] [place] [address] [address]  [place] [address]  Phone: [phone]  Fax:

## 2022-04-10 NOTE — ED PROVIDER NOTE - PHYSICAL EXAMINATION
vital signs: T(C): 36.3 (04-10-22 @ 08:48), Max: 36.3 (04-10-22 @ 08:48)  HR: 43 (04-10-22 @ 08:48) (43 - 43)  BP: 175/50 (04-10-22 @ 08:48) (175/50 - 175/50)  BP(mean): --  RR: 16 (04-10-22 @ 08:48) (16 - 16)  SpO2: 100% (04-10-22 @ 08:48) (100% - 100%)  GEN - NAD; well appearing; A+O x3   HEAD - NC/AT   EYES- PERRL, EOMI  ENT: Airway patent, mmm, Oral cavity and pharynx normal. No inflammation, swelling, exudate, or lesions.    NECK: Neck supple  PULMONARY - CTA b/l, symmetric breath sounds.   CARDIAC -s1s2, saw RR, no M,G,R  ABDOMEN - +BS, ND, tender diffusely to abd, soft, no guarding  BACK - no CVA tenderness, Normal  spine   EXTREMITIES - FROM, no edema   SKIN - no rash or bruising   NEUROLOGIC - alert, speech clear, no focal deficits  PSYCH -nl mood/affect, nl insight.

## 2022-04-10 NOTE — ED ADULT TRIAGE NOTE - CHIEF COMPLAINT QUOTE
pt c/o generalized abd pain since this am.  pt c/o nausea denies v/d.  pt also c/o dizziness x 1 week.  pt found to be bradycardic.  PMH: colon ca and bowel resection

## 2022-04-10 NOTE — ED ADULT NURSE NOTE - OBJECTIVE STATEMENT
Pt A/Ox4 states she has had chronic abdominal pain, but the pain became severe last night, denies vomiting, +nausea. states the pain is diffuse denies radiation of pain. Denies f/c, diarrhea or urinary changes. Pt denies chest pain or SOB, noted to be bradycardic in triage, placed on CM, HR 58 at this time, denies dizziness or lightheadedness. Pt appears well otherwise, breathing even and unlabored, skin warm and dry. PIV inserted with aseptic technique, blood drawn, labeled at bedside with 2 pt identifiers and sent to lab. Pt and family aware of POC, NAD. Medicated per order. Will continue to monitor.

## 2022-04-11 LAB
CULTURE RESULTS: SIGNIFICANT CHANGE UP
SPECIMEN SOURCE: SIGNIFICANT CHANGE UP

## 2022-07-02 ENCOUNTER — INPATIENT (INPATIENT)
Facility: HOSPITAL | Age: 87
LOS: 7 days | Discharge: HOME | End: 2022-07-10
Attending: STUDENT IN AN ORGANIZED HEALTH CARE EDUCATION/TRAINING PROGRAM | Admitting: STUDENT IN AN ORGANIZED HEALTH CARE EDUCATION/TRAINING PROGRAM

## 2022-07-02 VITALS
TEMPERATURE: 97 F | SYSTOLIC BLOOD PRESSURE: 150 MMHG | OXYGEN SATURATION: 99 % | HEIGHT: 62 IN | HEART RATE: 54 BPM | RESPIRATION RATE: 16 BRPM | DIASTOLIC BLOOD PRESSURE: 57 MMHG

## 2022-07-02 DIAGNOSIS — Z90.49 ACQUIRED ABSENCE OF OTHER SPECIFIED PARTS OF DIGESTIVE TRACT: Chronic | ICD-10-CM

## 2022-07-02 LAB
ALBUMIN SERPL ELPH-MCNC: 4.3 G/DL — SIGNIFICANT CHANGE UP (ref 3.3–5)
ALP SERPL-CCNC: 67 U/L — SIGNIFICANT CHANGE UP (ref 40–120)
ALT FLD-CCNC: 9 U/L — SIGNIFICANT CHANGE UP (ref 4–33)
ANION GAP SERPL CALC-SCNC: 15 MMOL/L — HIGH (ref 7–14)
APPEARANCE UR: CLEAR — SIGNIFICANT CHANGE UP
AST SERPL-CCNC: 17 U/L — SIGNIFICANT CHANGE UP (ref 4–32)
BACTERIA # UR AUTO: NEGATIVE — SIGNIFICANT CHANGE UP
BASE EXCESS BLDV CALC-SCNC: -2.6 MMOL/L — LOW (ref -2–3)
BASOPHILS # BLD AUTO: 0.03 K/UL — SIGNIFICANT CHANGE UP (ref 0–0.2)
BASOPHILS NFR BLD AUTO: 0.4 % — SIGNIFICANT CHANGE UP (ref 0–2)
BILIRUB SERPL-MCNC: 0.6 MG/DL — SIGNIFICANT CHANGE UP (ref 0.2–1.2)
BILIRUB UR-MCNC: NEGATIVE — SIGNIFICANT CHANGE UP
BLOOD GAS VENOUS COMPREHENSIVE RESULT: SIGNIFICANT CHANGE UP
BUN SERPL-MCNC: 13 MG/DL — SIGNIFICANT CHANGE UP (ref 7–23)
CALCIUM SERPL-MCNC: 10.7 MG/DL — HIGH (ref 8.4–10.5)
CHLORIDE BLDV-SCNC: 109 MMOL/L — HIGH (ref 96–108)
CHLORIDE SERPL-SCNC: 108 MMOL/L — HIGH (ref 98–107)
CO2 BLDV-SCNC: 26.1 MMOL/L — HIGH (ref 22–26)
CO2 SERPL-SCNC: 17 MMOL/L — LOW (ref 22–31)
COLOR SPEC: YELLOW — SIGNIFICANT CHANGE UP
CREAT SERPL-MCNC: 1.27 MG/DL — SIGNIFICANT CHANGE UP (ref 0.5–1.3)
DIFF PNL FLD: NEGATIVE — SIGNIFICANT CHANGE UP
EGFR: 41 ML/MIN/1.73M2 — LOW
EOSINOPHIL # BLD AUTO: 0.04 K/UL — SIGNIFICANT CHANGE UP (ref 0–0.5)
EOSINOPHIL NFR BLD AUTO: 0.5 % — SIGNIFICANT CHANGE UP (ref 0–6)
EPI CELLS # UR: 1 /HPF — SIGNIFICANT CHANGE UP (ref 0–5)
GAS PNL BLDV: 140 MMOL/L — SIGNIFICANT CHANGE UP (ref 136–145)
GAS PNL BLDV: SIGNIFICANT CHANGE UP
GLUCOSE BLDV-MCNC: 106 MG/DL — HIGH (ref 70–99)
GLUCOSE SERPL-MCNC: 104 MG/DL — HIGH (ref 70–99)
GLUCOSE UR QL: NEGATIVE — SIGNIFICANT CHANGE UP
HCO3 BLDV-SCNC: 24 MMOL/L — SIGNIFICANT CHANGE UP (ref 22–29)
HCT VFR BLD CALC: 36.9 % — SIGNIFICANT CHANGE UP (ref 34.5–45)
HCT VFR BLDA CALC: 38 % — SIGNIFICANT CHANGE UP (ref 34.5–46.5)
HGB BLD CALC-MCNC: 12.5 G/DL — SIGNIFICANT CHANGE UP (ref 11.5–15.5)
HGB BLD-MCNC: 12.1 G/DL — SIGNIFICANT CHANGE UP (ref 11.5–15.5)
HYALINE CASTS # UR AUTO: 2 /LPF — SIGNIFICANT CHANGE UP (ref 0–7)
IANC: 5.28 K/UL — SIGNIFICANT CHANGE UP (ref 1.8–7.4)
IMM GRANULOCYTES NFR BLD AUTO: 0.4 % — SIGNIFICANT CHANGE UP (ref 0–1.5)
KETONES UR-MCNC: NEGATIVE — SIGNIFICANT CHANGE UP
LACTATE BLDV-MCNC: 1 MMOL/L — SIGNIFICANT CHANGE UP (ref 0.5–2)
LEUKOCYTE ESTERASE UR-ACNC: ABNORMAL
LYMPHOCYTES # BLD AUTO: 1.43 K/UL — SIGNIFICANT CHANGE UP (ref 1–3.3)
LYMPHOCYTES # BLD AUTO: 19.2 % — SIGNIFICANT CHANGE UP (ref 13–44)
MCHC RBC-ENTMCNC: 30.2 PG — SIGNIFICANT CHANGE UP (ref 27–34)
MCHC RBC-ENTMCNC: 32.8 GM/DL — SIGNIFICANT CHANGE UP (ref 32–36)
MCV RBC AUTO: 92 FL — SIGNIFICANT CHANGE UP (ref 80–100)
MONOCYTES # BLD AUTO: 0.63 K/UL — SIGNIFICANT CHANGE UP (ref 0–0.9)
MONOCYTES NFR BLD AUTO: 8.5 % — SIGNIFICANT CHANGE UP (ref 2–14)
NEUTROPHILS # BLD AUTO: 5.28 K/UL — SIGNIFICANT CHANGE UP (ref 1.8–7.4)
NEUTROPHILS NFR BLD AUTO: 71 % — SIGNIFICANT CHANGE UP (ref 43–77)
NITRITE UR-MCNC: NEGATIVE — SIGNIFICANT CHANGE UP
NRBC # BLD: 0 /100 WBCS — SIGNIFICANT CHANGE UP
NRBC # FLD: 0 K/UL — SIGNIFICANT CHANGE UP
PCO2 BLDV: 51 MMHG — HIGH (ref 39–42)
PH BLDV: 7.29 — LOW (ref 7.32–7.43)
PH UR: 6 — SIGNIFICANT CHANGE UP (ref 5–8)
PLATELET # BLD AUTO: 272 K/UL — SIGNIFICANT CHANGE UP (ref 150–400)
PO2 BLDV: 24 MMHG — SIGNIFICANT CHANGE UP
POTASSIUM BLDV-SCNC: 4.5 MMOL/L — SIGNIFICANT CHANGE UP (ref 3.5–5.1)
POTASSIUM SERPL-MCNC: 4.4 MMOL/L — SIGNIFICANT CHANGE UP (ref 3.5–5.3)
POTASSIUM SERPL-SCNC: 4.4 MMOL/L — SIGNIFICANT CHANGE UP (ref 3.5–5.3)
PROT SERPL-MCNC: 7.9 G/DL — SIGNIFICANT CHANGE UP (ref 6–8.3)
PROT UR-MCNC: ABNORMAL
RBC # BLD: 4.01 M/UL — SIGNIFICANT CHANGE UP (ref 3.8–5.2)
RBC # FLD: 13.7 % — SIGNIFICANT CHANGE UP (ref 10.3–14.5)
RBC CASTS # UR COMP ASSIST: 5 /HPF — HIGH (ref 0–4)
SAO2 % BLDV: 30.5 % — SIGNIFICANT CHANGE UP
SODIUM SERPL-SCNC: 140 MMOL/L — SIGNIFICANT CHANGE UP (ref 135–145)
SP GR SPEC: 1.02 — SIGNIFICANT CHANGE UP (ref 1–1.05)
UROBILINOGEN FLD QL: SIGNIFICANT CHANGE UP
WBC # BLD: 7.44 K/UL — SIGNIFICANT CHANGE UP (ref 3.8–10.5)
WBC # FLD AUTO: 7.44 K/UL — SIGNIFICANT CHANGE UP (ref 3.8–10.5)
WBC UR QL: SIGNIFICANT CHANGE UP /HPF (ref 0–5)

## 2022-07-02 PROCEDURE — 99285 EMERGENCY DEPT VISIT HI MDM: CPT

## 2022-07-02 RX ORDER — CEFTRIAXONE 500 MG/1
1000 INJECTION, POWDER, FOR SOLUTION INTRAMUSCULAR; INTRAVENOUS ONCE
Refills: 0 | Status: COMPLETED | OUTPATIENT
Start: 2022-07-02 | End: 2022-07-02

## 2022-07-02 RX ORDER — KETOROLAC TROMETHAMINE 30 MG/ML
30 SYRINGE (ML) INJECTION ONCE
Refills: 0 | Status: DISCONTINUED | OUTPATIENT
Start: 2022-07-02 | End: 2022-07-02

## 2022-07-02 RX ORDER — ACETAMINOPHEN 500 MG
1000 TABLET ORAL ONCE
Refills: 0 | Status: COMPLETED | OUTPATIENT
Start: 2022-07-02 | End: 2022-07-02

## 2022-07-02 RX ORDER — ONDANSETRON 8 MG/1
4 TABLET, FILM COATED ORAL ONCE
Refills: 0 | Status: COMPLETED | OUTPATIENT
Start: 2022-07-02 | End: 2022-07-02

## 2022-07-02 RX ADMIN — Medication 30 MILLIGRAM(S): at 23:42

## 2022-07-02 RX ADMIN — Medication 400 MILLIGRAM(S): at 20:58

## 2022-07-02 RX ADMIN — CEFTRIAXONE 100 MILLIGRAM(S): 500 INJECTION, POWDER, FOR SOLUTION INTRAMUSCULAR; INTRAVENOUS at 23:42

## 2022-07-02 RX ADMIN — ONDANSETRON 4 MILLIGRAM(S): 8 TABLET, FILM COATED ORAL at 20:58

## 2022-07-02 NOTE — ED PROVIDER NOTE - OBJECTIVE STATEMENT
85 yo F PMHx of colon cancer 20 years prior (s[ , cholecystectomy, HTN presents with constipation x 2 weeks. Has had very small BMs in the last two weeks. Associated with nausea and left sided ongoing abdominal pain, no vomiting. Says she is passing gas. No fevers, chills, urinary problems.

## 2022-07-02 NOTE — ED PROVIDER NOTE - PHYSICAL EXAMINATION
GENERAL: no acute distress, non-toxic appearing  HEAD: normocephalic, atraumatic  HEENT: normal conjunctiva, oral mucosa moist, neck supple  CARDIAC: regular rate and rhythm, normal S1 and S2,  no appreciable murmurs  PULM: clear to ascultation bilaterally, no crackles, rales, rhonchi, or wheezing  GI: +LUQ, LLQ, RLQ TTP, abdomen distended, soft, no guarding or rebound tenderness  NEURO: alert and oriented x 3, normal speech, moving all extremities  MSK: no visible deformities, no peripheral edema, calf tenderness/redness/swelling  SKIN: no visible rashes, dry, well-perfused  PSYCH: appropriate mood and affect

## 2022-07-02 NOTE — ED PROVIDER NOTE - CLINICAL SUMMARY MEDICAL DECISION MAKING FREE TEXT BOX
Older female s/p abdominal surgeries and colon cancer presents with constipation with distended abdomen and diffuse tenderness. Will CTAP to assess for SBO, obtain labs and reassess.

## 2022-07-02 NOTE — ED ADULT NURSE NOTE - OBJECTIVE STATEMENT
Warren RN  received pt in bed A and OX 3 in NAD resting comfortably, c/o diffuse abd pain, and small hard stools for 1 month, denies fever chills, reports stool are brown in color, abd distended  and hard, but non tender, skin color appropriate for ethnicity, pt is well hydrated and nourished, orders noted and completed, fall precautions in place.

## 2022-07-02 NOTE — ED PROVIDER NOTE - ATTENDING CONTRIBUTION TO CARE
Attending Statement: I have personally seen and examined this patient. I have fully participated in the care of this patient. I have reviewed all pertinent clinical information, including history physical exam, plan and the Resident's note and agree except as noted  86yoF hx of HTN, hx colon ca in remission pw abdominal pain and constipation for two weeks. Lower abdominal pain w nausea, and decrease po intake. no vomiting. no BM. +passing gas. no fever/chills. no dysuria Vital signs noted. elderly  female. no distress. no work of breathing. soft nondistended tender left abdomen. no rebound    plan labs, ct A/P, pain med prn

## 2022-07-02 NOTE — ED PROVIDER NOTE - NS ED ROS FT
GENERAL: no fever, no chills, no weight loss  EYES: no change in vision, no irritation, no discharge, no redness, no pain  HEENT: no trouble swallowing or speaking, no throat pain, no ear pain  CARDIAC: no chest pain, no palpitations   PULMONARY: no cough, no shortness of breath, no wheezing  GI: + abdominal pain, + nausea, no vomiting, no diarrhea, + constipation, no melena, no hematochezia, no hematemesis  : no changes in urination, no dysuria, no frequency, no hematuria, no discharge  SKIN: no rashes  NEURO: no headache, no numbness, no weakness  MSK: no joint pain, no muscle pain, no back pain, no calf pain

## 2022-07-02 NOTE — ED ADULT TRIAGE NOTE - CHIEF COMPLAINT QUOTE
Pt c/o constipation x 2 wks, reports she went to [facility] yesterday was provided meds to no relief. Endorsing abd pain, n/v. denies fever, chills. LBM yesterday, "only a small amt." PMHx: colon cancer, HTN

## 2022-07-03 ENCOUNTER — TRANSCRIPTION ENCOUNTER (OUTPATIENT)
Age: 87
End: 2022-07-03

## 2022-07-03 DIAGNOSIS — Z29.9 ENCOUNTER FOR PROPHYLACTIC MEASURES, UNSPECIFIED: ICD-10-CM

## 2022-07-03 DIAGNOSIS — K21.9 GASTRO-ESOPHAGEAL REFLUX DISEASE WITHOUT ESOPHAGITIS: ICD-10-CM

## 2022-07-03 DIAGNOSIS — K56.609 UNSPECIFIED INTESTINAL OBSTRUCTION, UNSPECIFIED AS TO PARTIAL VERSUS COMPLETE OBSTRUCTION: ICD-10-CM

## 2022-07-03 DIAGNOSIS — I10 ESSENTIAL (PRIMARY) HYPERTENSION: ICD-10-CM

## 2022-07-03 LAB
CULTURE RESULTS: SIGNIFICANT CHANGE UP
SARS-COV-2 RNA SPEC QL NAA+PROBE: SIGNIFICANT CHANGE UP
SPECIMEN SOURCE: SIGNIFICANT CHANGE UP

## 2022-07-03 PROCEDURE — 99232 SBSQ HOSP IP/OBS MODERATE 35: CPT | Mod: GC

## 2022-07-03 PROCEDURE — 74177 CT ABD & PELVIS W/CONTRAST: CPT | Mod: 26,MA

## 2022-07-03 PROCEDURE — 99222 1ST HOSP IP/OBS MODERATE 55: CPT | Mod: GC

## 2022-07-03 PROCEDURE — 99223 1ST HOSP IP/OBS HIGH 75: CPT | Mod: GC

## 2022-07-03 RX ORDER — AMLODIPINE BESYLATE 2.5 MG/1
10 TABLET ORAL EVERY 24 HOURS
Refills: 0 | Status: DISCONTINUED | OUTPATIENT
Start: 2022-07-03 | End: 2022-07-03

## 2022-07-03 RX ORDER — LANOLIN ALCOHOL/MO/W.PET/CERES
3 CREAM (GRAM) TOPICAL AT BEDTIME
Refills: 0 | Status: DISCONTINUED | OUTPATIENT
Start: 2022-07-03 | End: 2022-07-10

## 2022-07-03 RX ORDER — ATENOLOL 25 MG/1
50 TABLET ORAL DAILY
Refills: 0 | Status: DISCONTINUED | OUTPATIENT
Start: 2022-07-03 | End: 2022-07-03

## 2022-07-03 RX ORDER — LISINOPRIL 2.5 MG/1
20 TABLET ORAL DAILY
Refills: 0 | Status: DISCONTINUED | OUTPATIENT
Start: 2022-07-03 | End: 2022-07-03

## 2022-07-03 RX ORDER — PANTOPRAZOLE SODIUM 20 MG/1
40 TABLET, DELAYED RELEASE ORAL
Refills: 0 | Status: DISCONTINUED | OUTPATIENT
Start: 2022-07-03 | End: 2022-07-03

## 2022-07-03 RX ORDER — SODIUM CHLORIDE 9 MG/ML
1000 INJECTION, SOLUTION INTRAVENOUS
Refills: 0 | Status: DISCONTINUED | OUTPATIENT
Start: 2022-07-03 | End: 2022-07-05

## 2022-07-03 RX ORDER — CHOLECALCIFEROL (VITAMIN D3) 125 MCG
400 CAPSULE ORAL DAILY
Refills: 0 | Status: DISCONTINUED | OUTPATIENT
Start: 2022-07-03 | End: 2022-07-03

## 2022-07-03 RX ORDER — ACETAMINOPHEN 500 MG
650 TABLET ORAL EVERY 6 HOURS
Refills: 0 | Status: DISCONTINUED | OUTPATIENT
Start: 2022-07-03 | End: 2022-07-03

## 2022-07-03 RX ORDER — ONDANSETRON 8 MG/1
4 TABLET, FILM COATED ORAL EVERY 8 HOURS
Refills: 0 | Status: DISCONTINUED | OUTPATIENT
Start: 2022-07-03 | End: 2022-07-10

## 2022-07-03 RX ORDER — ENOXAPARIN SODIUM 100 MG/ML
40 INJECTION SUBCUTANEOUS ONCE
Refills: 0 | Status: COMPLETED | OUTPATIENT
Start: 2022-07-03 | End: 2022-07-03

## 2022-07-03 RX ADMIN — SODIUM CHLORIDE 75 MILLILITER(S): 9 INJECTION, SOLUTION INTRAVENOUS at 18:36

## 2022-07-03 RX ADMIN — ENOXAPARIN SODIUM 40 MILLIGRAM(S): 100 INJECTION SUBCUTANEOUS at 18:36

## 2022-07-03 RX ADMIN — Medication 1 ENEMA: at 10:58

## 2022-07-03 RX ADMIN — SODIUM CHLORIDE 75 MILLILITER(S): 9 INJECTION, SOLUTION INTRAVENOUS at 13:20

## 2022-07-03 NOTE — PATIENT PROFILE ADULT - FALL HARM RISK - HARM RISK INTERVENTIONS
Assistance with ambulation/Communicate Risk of Fall with Harm to all staff/Monitor for mental status changes/Monitor gait and stability/Reinforce activity limits and safety measures with patient and family/Reorient to person, place and time as needed/Review medications for side effects contributing to fall risk/Sit up slowly, dangle for a short time, stand at bedside before walking/Tailored Fall Risk Interventions/Toileting schedule using arm’s reach rule for commode and bathroom/Use of alarms - bed, chair and/or voice tab/Visual Cue: Yellow wristband and red socks/Bed in lowest position, wheels locked, appropriate side rails in place/Call bell, personal items and telephone in reach/Instruct patient to call for assistance before getting out of bed or chair/Non-slip footwear when patient is out of bed/[place] to call system/Physically safe environment - no spills, clutter or unnecessary equipment/Purposeful Proactive Rounding/Room/bathroom lighting operational, light cord in reach

## 2022-07-03 NOTE — DISCHARGE NOTE PROVIDER - CARE PROVIDER_API CALL
Trish Hackett  Internal Medicine  [address] [place] [id number]  Phone: ()-  Fax: ()-  Follow Up Time:    Trish Hackett  Internal Medicine  [address] [place] [address]  Phone: ()-  Fax: ()-  Follow Up Time:     Louie Young)  ColonRectal Surgery; Surgery  [address] [place] [address], [address]  [place] [address]  Phone: [phone]  Fax: [phone]  Follow Up Time:

## 2022-07-03 NOTE — DISCHARGE NOTE PROVIDER - CARE PROVIDERS DIRECT ADDRESSES
,[email or web address]

## 2022-07-03 NOTE — DISCHARGE NOTE PROVIDER - PROVIDER TOKENS
PROVIDER:[TOKEN:[[id number]]] PROVIDER:[TOKEN:[[id number]]],PROVIDER:[TOKEN:[[id number]]]

## 2022-07-03 NOTE — CONSULT NOTE ADULT - ASSESSMENT
JORGE EDWARDS is a 86y F, Creole speaking, with h/o colon cancer s/p partial colectomy with ileocolic anastomosis (~20 yrs PTA, no chemoRT), SBO 2017 s/p medical management, HTN, prior CCY, and GERD who initially presented to the ED with complaints of worsening constipation x 2 weeks, abdominal pain and bloating, noted to have a LBO on CT for which GI was consulted.      # Fecalith LBO -  GHASSAN at bedside and noted a smudge of yellow-brown stool; no formed stool to disimpact.  # h/o colon cancer s/p resection  - CTAP revealed air-fluid air contrast levels throughout non dilated SB (a pattern most suggestive of ileus. There are postsurgical changes from an apparent previous partial colectomy with an ileocolic anastomosis in the right mid abdomen. There is liquid stool with air-fluid levels throughout the colon, possibly due to laxative effects. The colon is distended with the right colon measuring up to 8.9 cm in diameter. There is an abrupt transition point in bowel caliber in the proximal sigmoid colon where there is a 3 cm x 2.8 cm x3.6 cm fecalith    Recommendations:  - Findings not amenable to endoscopic therapy at this time as discussed with Advanced GI Attending  - Discussed with ED Attending; recommend optimized bowel regimen with multiple enemas as she will unlikely be able to tolerate a prep  - If she is unsuccessful relieving the obstruction with a bowel regimen, recommend surgical intervention  - Surgery following  - Closely monitor for worsening sx or decline in condition  - Serial abdominal exams with Xray  - Daily CBC, BMP, coags  - Ensure adequate hydration  - Antiemetics as needed  - Pain control, avoid NSAIDs and opioids  - Rest of care per primary    Preliminary note until signed by Attending.    Thank you for involving us in this patient's care.    Latoya Harrison MD  Gastroenterology/Hepatology Fellow, PGY-VI    NON-URGENT CONSULTS:  Please email [email or web address]  [email or web address]  AT NIGHT AND ON WEEKENDS:  Contact on-call GI fellow via answering service ([phone]) from 5pm-8am and on weekends/holidays  MONDAY-FRIDAY 8AM-5PM:  Pager# [phone] ([facility])  GI Phone# [phone] ([facility])   JORGE EDWARDS is a 86y F, Creole speaking, with h/o colon cancer s/p partial colectomy with ileocolic anastomosis (~20 yrs PTA, no chemoRT), SBO 2017 s/p medical management, HTN, prior CCY, and GERD who initially presented to the ED with complaints of worsening constipation x 2 weeks, abdominal pain and bloating, noted to have a LBO on CT for which GI was consulted.      # Fecalith LBO -  GHASSAN at bedside and noted a smudge of yellow-brown stool; no formed stool to disimpact.  # h/o colon cancer s/p resection  - CTAP revealed air-fluid air contrast levels throughout non dilated SB (a pattern most suggestive of ileus. There are postsurgical changes from an apparent previous partial colectomy with an ileocolic anastomosis in the right mid abdomen. There is liquid stool with air-fluid levels throughout the colon, possibly due to laxative effects. The colon is distended with the right colon measuring up to 8.9 cm in diameter. There is an abrupt transition point in bowel caliber in the proximal sigmoid colon where there is a 3 cm x 2.8 cm x3.6 cm fecalith    Recommendations:  - Findings not amenable to endoscopic therapy at this time as discussed with Advanced GI Attending  - Discussed with ED Attending; recommend optimized bowel regimen with multiple enemas as she will unlikely be able to tolerate a prep  - If she is unsuccessful relieving the obstruction with enemas, recommend surgical intervention  - Surgery following  - Closely monitor for worsening sx or decline in condition  - Serial abdominal exams with Xray  - Daily CBC, BMP, coags  - Ensure adequate hydration  - Antiemetics as needed  - Pain control, avoid NSAIDs and opioids  - Rest of care per primary    Preliminary note until signed by Attending.    Thank you for involving us in this patient's care.    Latoya Harrison MD  Gastroenterology/Hepatology Fellow, PGY-VI    NON-URGENT CONSULTS:  Please email [email or web address]  AT NIGHT AND ON WEEKENDS:  Contact on-call GI fellow via answering service ([phone]) from 5pm-8am and on weekends/holidays  MONDAY-FRIDAY 8AM-5PM:  Pager# [phone] ([facility])  GI Phone# [phone] ([facility])

## 2022-07-03 NOTE — DISCHARGE NOTE PROVIDER - NSFOLLOWUPCLINICS_GEN_ALL_ED_FT
[facility]  Gastroenterology  [address] [place] [address] [address]  [place] [address]  Phone: [phone]  Fax:

## 2022-07-03 NOTE — PATIENT PROFILE ADULT - FUNCTIONAL ASSESSMENT - BASIC MOBILITY 6.
3-calculated by average/Not able to assess (calculate score using [facility] averaging method)

## 2022-07-03 NOTE — CONSULT NOTE ADULT - SUBJECTIVE AND OBJECTIVE BOX
Chief Complaint:  Patient is a 86y old  Female who presents with a chief complaint of large bowel obstruction with fecalith (03 Jul 2022 12:10)      HPI:JORGE EDWARDS is a 86y F, Creole speaking, with h/o colon cancer s/p partial colectomy with ileocolic anastomosis (~20 yrs PTA, no chemoRT), SBO 2017 s/p medical management, HTN, prior CCY, and GERD who initially presented to the ED with complaints of worsening constipation x 2 weeks, abdominal pain and bloating, noted to have a LBO on CT for which GI was consulted. Patient and daughter at bedside, denying a proper bowel movement in at least 2 weeks; says passing small pebble like stools, but no liquid stool and minimal-no gas. Was given stool softener to take at home, but did not help. Denies prior episode of a large bowel obstruction. No bloody red/black BMs. Says last colonoscopy was 1 year ago, does not recall the details, but thinks it was fine and has to get them every year because of fhx (brother also had colon cancer). Nausea, no vomiting. No f/c, weight loss or night sweats. She's been HDS and afebrile w/o white count. No lactic acidosis, hgb wnl. CTAP revealed air-fluid air contrast levels throughout non dilated SB (a pattern most suggestive of ileus. There are postsurgical changes from an apparent previous partial colectomy with an ileocolic anastomosis in the right mid abdomen. There is liquid stool with air-fluid levels throughout the colon, possibly due to laxative effects. The colon is distended with the right colon measuring up to 8.9 cm in diameter. There is an abrupt transition point in bowel caliber in the proximal sigmoid colon where there is a 3 cm x 2.8 cm x3.6 cm fecalith. Surgery consulted, no plans for surgical intervention, recommending GI consult. GI performed GHASSAN at bedside and noted a smudge of yellow-brown stool; no formed stool to disimpact.    PMHX/PSHX:  HTN (hypertension)    Colon cancer    GERD (gastroesophageal reflux disease)    S/P colon resection    History of cholecystectomy      Allergies:  No Known Allergies      Home Medications: reviewed  Hospital Medications:  aluminum hydroxide/magnesium hydroxide/simethicone Suspension 30 milliLiter(s) Oral every 4 hours PRN  ATENolol  Tablet 50 milliGRAM(s) Oral daily  lactated ringers. 1000 milliLiter(s) IV Continuous <Continuous>  melatonin 3 milliGRAM(s) Oral at bedtime PRN  ondansetron Injectable 4 milliGRAM(s) IV Push every 8 hours PRN      Social History:   Tob: Denies  EtOH: Denies  Illicit Drugs: Denies    Family history:  No pertinent family history in first degree relatives    FHx: colon cancer (Sibling)       ROS:   Complete and normal except as mentioned above.    PHYSICAL EXAM:   Vital Signs:  Vital Signs Last 24 Hrs  T(C): 36.2 (03 Jul 2022 13:20), Max: 36.8 (03 Jul 2022 03:25)  T(F): 97.1 (03 Jul 2022 13:20), Max: 98.3 (03 Jul 2022 06:27)  HR: 59 (03 Jul 2022 13:20) (50 - 69)  BP: 141/69 (03 Jul 2022 13:20) (141/69 - 165/72)  BP(mean): --  RR: 16 (03 Jul 2022 13:20) (16 - 18)  SpO2: 100% (03 Jul 2022 13:20) (97% - 100%)  Daily Height in cm: 157.48 (02 Jul 2022 18:05)    Daily     GENERAL: no acute distress  NEURO: alert  HEENT: anicteric sclera, no conjunctival pallor appreciated  CHEST: no respiratory distress, no accessory muscle use  CARDIAC: regular rate, rhythm  ABDOMEN: soft, mildly tender, distended, no rebound or guarding  GHASSAN: smudge of yellow-brown stool; no formed stool to disimpact  EXTREMITIES: warm, well perfused, no edema  SKIN: no lesions noted    LABS: reviewed                        12.1   7.44  )-----------( 272      ( 02 Jul 2022 21:08 )             36.9     07-02    140  |  108<H>  |  13  ----------------------------<  104<H>  4.4   |  17<L>  |  1.27    Ca    10.7<H>      02 Jul 2022 21:08    TPro  7.9  /  Alb  4.3  /  TBili  0.6  /  DBili  x   /  AST  17  /  ALT  9   /  AlkPhos  67  07-02    LIVER FUNCTIONS - ( 02 Jul 2022 21:08 )  Alb: 4.3 g/dL / Pro: 7.9 g/dL / ALK PHOS: 67 U/L / ALT: 9 U/L / AST: 17 U/L / GGT: x               Diagnostic Studies: see sunrise for full report

## 2022-07-03 NOTE — DISCHARGE NOTE PROVIDER - NSDCCPCAREPLAN_GEN_ALL_CORE_FT
PRINCIPAL DISCHARGE DIAGNOSIS  Diagnosis: Large bowel obstruction  Assessment and Plan of Treatment: You were admitted to the hospital, because you had 2 weeks of constipation that was found to be caused by stool stopped in your colon.  You were placed on orders to be given nothing by mouth, and instead you were given fluids by IV.  An enema was administered, and you were able to pass stool.  Please go to the ED immediately if you experience the following symptoms: blood in your stool, severe abdominal pain, fever, nausea/vomiting.       PRINCIPAL DISCHARGE DIAGNOSIS  Diagnosis: Large bowel obstruction  Assessment and Plan of Treatment: You were admitted to the hospital, because you had 2 weeks of constipation that was found to be caused by a narrowing in your colon.  You underwent a procedure called a sigmoidoscopy in which a camera was used to visualize the outermost portion of your colon, and a narrowing of ~1cm of undetermined length was found.  A biopsy of this region was taken to determine whether it was cancerous, and results were ________________________.    You were also found to have a dilated gallbladder duct in your CT scans.  Another scan called an MRCP took a closer look at your dilated gallbladder duct and determined this is a normal finding that was likely due to your gallbladder surgery.  Please go to the ED immediately if you experience the following symptoms: blood in your stool, severe abdominal pain, fever, nausea/vomiting.      SECONDARY DISCHARGE DIAGNOSES  Diagnosis: NOMI (acute kidney injury)  Assessment and Plan of Treatment: During your admission, your blood tests showed that your kidneys showed a decrease in function.  This may have been caused by not drinking enough fluids during your stay and possibly the IV contrast that was used for one of your scans.  Please drink more fluids to remain hydrated, which will allow your kidney function to improve.    Diagnosis: S/P cholecystectomy  Assessment and Plan of Treatment:      PRINCIPAL DISCHARGE DIAGNOSIS  Diagnosis: Constipation  Assessment and Plan of Treatment: You were admitted to the hospital, because you had 2 weeks of constipation that was found to be caused by a narrowing in your colon.  You underwent a procedure called a sigmoidoscopy in which a camera was used to visualize the outermost portion of your colon, and a narrowing of ~1cm of undetermined length was found.  A biopsy of this region was taken, results are still pending, which you should follow up outpatient.  You were also found to have a dilated gallbladder duct in your CT scans.  Another scan called an MRCP took a closer look at your dilated gallbladder duct and determined this is a normal finding that was likely due to your gallbladder surgery.  Due to the narrowing, you are very prone to constipation, please continue to take miralax 1-2 times a day and senna 2 tablets at bedtime. If you are still not having bowel movements with this, please also take ducolax 1 tablet at bedtime as well. Prescriptions for all 3 have been sent to your pharmacy.   *Please make sure to follow up outpatient with gastroenterology and with colorectal surgery (Dr. Young).   Please go to the ED immediately if you experience the following symptoms: blood in your stool, severe abdominal pain, fever, nausea/vomiting.      SECONDARY DISCHARGE DIAGNOSES  Diagnosis: NOMI (acute kidney injury)  Assessment and Plan of Treatment: During your admission, your blood tests showed that your kidneys showed a decrease in function, which has now improved. This was likely caused by dehydration and poor food/liquid intake while in the hospital.     PRINCIPAL DISCHARGE DIAGNOSIS  Diagnosis: Constipation  Assessment and Plan of Treatment: You were admitted to the hospital, because you had 2 weeks of constipation that was found to be caused by a narrowing in your colon.  You underwent a procedure called a sigmoidoscopy in which a camera was used to visualize the outermost portion of your colon, and a narrowing of ~1cm of undetermined length was found.  A biopsy of this region was taken, results are still pending, which you should follow up outpatient.  You were also found to have a dilated gallbladder duct in your CT scans.  Another scan called an MRCP took a closer look at your dilated gallbladder duct and determined this is a normal finding that was likely due to your gallbladder surgery.  Due to the narrowing, you are very prone to constipation, please continue to take miralax 1-2 times a day and senna 2 tablets at bedtime. If you are still not having bowel movements with this, please also take ducolax 1 tablet at bedtime as well. Prescriptions for all 3 have been sent to your pharmacy.   *Please make sure to follow up outpatient with gastroenterology and with colorectal surgery (Dr. Young).   Please go to the ED immediately if you experience the following symptoms: blood in your stool, severe abdominal pain, fever, nausea/vomiting.      SECONDARY DISCHARGE DIAGNOSES  Diagnosis: NOMI (acute kidney injury)  Assessment and Plan of Treatment: During your admission, your blood tests showed that your kidneys showed a decrease in function, which has now improved. This was likely caused by dehydration and poor food/liquid intake while in the hospital.    Diagnosis: HTN (hypertension)  Assessment and Plan of Treatment: Your blood pressure medication, atenolol, was held during your stay because of your soft blood pressures and low heart rate. Please speak to your cardiology or primary care doctor before resuming.   Continue to take your amlodipine as usual.

## 2022-07-03 NOTE — H&P ADULT - ASSESSMENT
Patient is a 85 y/o Creole-speaking F with hx of colon ca s/p resection 20 yrs ago, s/p cholecystectomy, HTN, and GERD presenting with constipation 2/2 LBO.  Preliminary CTAP shows fecalith in proximal sigmoid colon; patient was passing stool following NS enema administration.  Surgery and GI currently following.  Patient currently NPO.

## 2022-07-03 NOTE — H&P ADULT - NSHPREVIEWOFSYSTEMS_GEN_ALL_CORE
Gen: denies dizziness, headache, fevers, weakness, n/v  CV: Denies chest pain, palpitations, SOB  Pulm: denies SOB  GI: endorses constipation, bloating, abdominal discomfort, denies blood in stool  : denies dysuria, trouble urinating, increased urinary frequency  MSK: endorses pain in joints  Neuro: denies weakness, is able to walk

## 2022-07-03 NOTE — CONSULT NOTE ADULT - SUBJECTIVE AND OBJECTIVE BOX
GENERAL SURGERY CONSULT NOTE  Consulting surgical team: Colorectal Surgery  Consulting attending:     HPI:  HPI: 83F Creole speaking with hx of HTN, cholecystectomy and colon ca s/p resection 20 years ago presents with 2 weeks of constipation. Reports that she has had pain for the past 2 weeks, passing gas but very little stool. Reports she went to PCP yesterday but prescribed her laxatives. However did not pass more stool with the laxative. Denies fevers/chills/CP/SOB.     ED: Afebrile and VSS. Labs WNL, no leukocytosis and lactate normal 1. CT scan shows LBO 2/2 3x2.8x3.6cm fecalith in the proximal sigmoid colon. R colon dilated to 8.9 cm. Stomach and SB not dilated.     PAST MEDICAL HISTORY:  HTN (hypertension)  Colon cancer      PAST SURGICAL HISTORY:  S/P colon resection  History of cholecystectomy      SOCIAL HISTORY:  - Denies EtOH abuse, smoking, IVDA    MEDICATIONS:      ALLERGIES:  No Known Allergies      VITALS & I/Os:  Vital Signs Last 24 Hrs  T(C): 36.8 (2022 03:25), Max: 36.8 (2022 03:25)  T(F): 98.2 (2022 03:25), Max: 98.2 (2022 03:25)  HR: 69 (2022 03:25) (54 - 69)  BP: 162/70 (2022 03:25) (150/57 - 163/59)  BP(mean): --  RR: 18 (2022 03:25) (16 - 18)  SpO2: 100% (2022 03:25) (99% - 100%)    I&O's Summary      PHYSICAL EXAM:  General; NAD  Respiratory: nonlabored breathing  Abdomen: soft, distended, minimally TTP in the mid abdomen. No rebound or guarding  Extremities: WWP     LABS:                        12.1   7.44  )-----------( 272      ( 2022 21:08 )             36.9     07-02    140  |  108<H>  |  13  ----------------------------<  104<H>  4.4   |  17<L>  |  1.27    Ca    10.7<H>      2022 21:08    TPro  7.9  /  Alb  4.3  /  TBili  0.6  /  DBili  x   /  AST  17  /  ALT  9   /  AlkPhos  67  07-    Lactate:   @ 21:08  1.0        Urinalysis Basic - ( 2022 21:08 )    Color: Yellow / Appearance: Clear / S.020 / pH: x  Gluc: x / Ketone: Negative  / Bili: Negative / Urobili: <2 mg/dL   Blood: x / Protein: 30 mg/dL / Nitrite: Negative   Leuk Esterase: Large / RBC: 5 /HPF / WBC Many /HPF   Sq Epi: x / Non Sq Epi: 1 /HPF / Bacteria: Negative      IMAGING:  < from: CT Abdomen and Pelvis w/ Oral Cont and w/ IV Cont (22 @ 01:43) >  FINDINGS:  Lungs: Minor dependent hypoventilatory changes in both lower lobes. Lung   bases  are otherwise clear. No pleural effusions.  Heart: Mild cardiomegaly with mitral annular calcification.    Liver: Unremarkable.  Gallbladder and bile ducts: The gallbladder is surgically absent. There   is no  intrahepatic biliary ductal dilatation. The common bile duct again   measures up  to 1 cm in diameter, within normal limits for age and post cholecystectomy  state.  Pancreas: Unremarkable.  Spleen: Unremarkable. The spleen is normal in size.  Adrenal glands: Unremarkable.  Kidneys and ureters: Normal and symmetric renal enhancement. No   hydronephrosis  or hydroureter. Simple-appearing cortical cysts in both kidneys, the   largest  measuring up to 2.4 cm in the posterior left upper pole.  Stomach and bowel: Oral contrast has been administered which has reached   the  mid small bowel at the time of imaging. The stomach is not overtly   dilated.  There are air-fluid in air-contrast levels throughout nondilated small   bowel, a  pattern most suggestive of ileus. There are postsurgical changes from an  apparent previous partial colectomy with an ileocolic anastomosis in the   right  mid abdomen. There is liquid stool with air-fluid levels throughout the   colon,  possibly due to laxative effects. The colon is distended with the right   colon  measuring up to 8.9 cm in diameter. There is an abrupt transition point in  bowel caliber in the proximal sigmoid colon where there is a 3 cm x 2.8   cm x  3.6 cm fecalith or &quot;stool ball&quot; (see series 2, images 88-94).  Appendix: The appendix is not identified and is presumed surgically   absent.    Intraperitoneal space: Trace ascites. No fluid collection. No   pneumoperitoneum.  Retroperitoneal space: Unremarkable. No retroperitoneal collection or   mass.  Vasculature: Moderate atherosclerotic vascular calcifications. Normal   caliber  abdominal aorta.  Lymph nodes: No pathologically enlarged lymph nodes.  Urinary bladder: Underdistended, which limits evaluation. No gross   abnormality.  Reproductive: Unremarkable as visualized.  Bones/joints: Degenerative changes. No suspicious osseous lesions.  Soft tissues: Small fat- and fluid-containing left inguinal hernia. Small  fat-containing right inguinal hernia. Midline postoperative scarring in   the  infraumbilical ventral abdominal wall.    IMPRESSION:  1. Large bowel obstruction secondary to a 3 cm x 2.8 cm x 3.6 cm fecalith   in  the proximal sigmoid colon.  2. Trace ascites, nonspecific.  3. Additional stable chronic and incidental findings are discussed in the   body  of the report.      < end of copied text >   GENERAL SURGERY CONSULT NOTE  Consulting surgical team: Colorectal Surgery  Consulting attending:     HPI:  HPI: 83F Creole speaking with hx of HTN, cholecystectomy and colon ca s/p resection 20 years ago presents with 2 weeks of constipation. Reports that she has had pain for the past 2 weeks, passing gas but very little stool. Reports she went to PCP yesterday who prescribed her laxatives. However did not pass more stool with the laxative. No nausea or vomiting. Patient normally has 2-3 BM a day that are not diarrhea. Patient has yearly colonoscopies and per patient was normal. Denies fevers/chills/CP/SOB.     ED: Afebrile and VSS. Labs WNL, no leukocytosis and lactate normal 1. CT scan shows LBO 2/2 3x2.8x3.6cm fecalith in the proximal sigmoid colon. R colon dilated to 8.9 cm. Stomach and SB not dilated.     PAST MEDICAL HISTORY:  HTN (hypertension)  Colon cancer      PAST SURGICAL HISTORY:  S/P colon resection  History of cholecystectomy      SOCIAL HISTORY:  - Denies EtOH abuse, smoking, IVDA    MEDICATIONS:      ALLERGIES:  No Known Allergies      VITALS & I/Os:  Vital Signs Last 24 Hrs  T(C): 36.8 (2022 03:25), Max: 36.8 (2022 03:25)  T(F): 98.2 (2022 03:25), Max: 98.2 (2022 03:25)  HR: 69 (2022 03:25) (54 - 69)  BP: 162/70 (2022 03:25) (150/57 - 163/59)  BP(mean): --  RR: 18 (2022 03:25) (16 - 18)  SpO2: 100% (2022 03:25) (99% - 100%)    I&O's Summary      PHYSICAL EXAM:  General; NAD  Respiratory: nonlabored breathing  Abdomen: soft, distended, minimally TTP in the mid abdomen. No rebound or guarding  Extremities: WW     LABS:                        12.1   7.44  )-----------( 272      ( 2022 21:08 )             36.9     07-02    140  |  108<H>  |  13  ----------------------------<  104<H>  4.4   |  17<L>  |  1.27    Ca    10.7<H>      2022 21:08    TPro  7.9  /  Alb  4.3  /  TBili  0.6  /  DBili  x   /  AST  17  /  ALT  9   /  AlkPhos  67      Lactate:   @ 21:08  1.0        Urinalysis Basic - ( 2022 21:08 )    Color: Yellow / Appearance: Clear / S.020 / pH: x  Gluc: x / Ketone: Negative  / Bili: Negative / Urobili: <2 mg/dL   Blood: x / Protein: 30 mg/dL / Nitrite: Negative   Leuk Esterase: Large / RBC: 5 /HPF / WBC Many /HPF   Sq Epi: x / Non Sq Epi: 1 /HPF / Bacteria: Negative      IMAGING:  < from: CT Abdomen and Pelvis w/ Oral Cont and w/ IV Cont (22 @ 01:43) >  FINDINGS:  Lungs: Minor dependent hypoventilatory changes in both lower lobes. Lung   bases  are otherwise clear. No pleural effusions.  Heart: Mild cardiomegaly with mitral annular calcification.    Liver: Unremarkable.  Gallbladder and bile ducts: The gallbladder is surgically absent. There   is no  intrahepatic biliary ductal dilatation. The common bile duct again   measures up  to 1 cm in diameter, within normal limits for age and post cholecystectomy  state.  Pancreas: Unremarkable.  Spleen: Unremarkable. The spleen is normal in size.  Adrenal glands: Unremarkable.  Kidneys and ureters: Normal and symmetric renal enhancement. No   hydronephrosis  or hydroureter. Simple-appearing cortical cysts in both kidneys, the   largest  measuring up to 2.4 cm in the posterior left upper pole.  Stomach and bowel: Oral contrast has been administered which has reached   the  mid small bowel at the time of imaging. The stomach is not overtly   dilated.  There are air-fluid in air-contrast levels throughout nondilated small   bowel, a  pattern most suggestive of ileus. There are postsurgical changes from an  apparent previous partial colectomy with an ileocolic anastomosis in the   right  mid abdomen. There is liquid stool with air-fluid levels throughout the   colon,  possibly due to laxative effects. The colon is distended with the right   colon  measuring up to 8.9 cm in diameter. There is an abrupt transition point in  bowel caliber in the proximal sigmoid colon where there is a 3 cm x 2.8   cm x  3.6 cm fecalith or &quot;stool ball&quot; (see series 2, images 88-94).  Appendix: The appendix is not identified and is presumed surgically   absent.    Intraperitoneal space: Trace ascites. No fluid collection. No   pneumoperitoneum.  Retroperitoneal space: Unremarkable. No retroperitoneal collection or   mass.  Vasculature: Moderate atherosclerotic vascular calcifications. Normal   caliber  abdominal aorta.  Lymph nodes: No pathologically enlarged lymph nodes.  Urinary bladder: Underdistended, which limits evaluation. No gross   abnormality.  Reproductive: Unremarkable as visualized.  Bones/joints: Degenerative changes. No suspicious osseous lesions.  Soft tissues: Small fat- and fluid-containing left inguinal hernia. Small  fat-containing right inguinal hernia. Midline postoperative scarring in   the  infraumbilical ventral abdominal wall.    IMPRESSION:  1. Large bowel obstruction secondary to a 3 cm x 2.8 cm x 3.6 cm fecalith   in  the proximal sigmoid colon.  2. Trace ascites, nonspecific.  3. Additional stable chronic and incidental findings are discussed in the   body  of the report.      < end of copied text >   GENERAL SURGERY CONSULT NOTE  Consulting surgical team: General Surgery   Consulting attending: Dr. Garcia     HPI:  HPI: 83F Creole speaking with hx of HTN, cholecystectomy and colon ca s/p resection 20 years ago presents with 2 weeks of constipation. Reports that she has had pain for the past 2 weeks, passing gas but very little stool. Reports she went to PCP yesterday who prescribed her laxatives. However did not pass more stool with the laxative. No nausea or vomiting. Patient normally has 2-3 BM a day that are not diarrhea. Patient has yearly colonoscopies and per patient the one last year was normal. Denies fevers/chills/CP/SOB.     ED: Afebrile and VSS. Labs WNL, no leukocytosis and lactate normal 1. CT scan shows LBO 2/2 3x2.8x3.6cm fecalith in the proximal sigmoid colon. R colon dilated to 8.9 cm. Stomach and SB not dilated.     PAST MEDICAL HISTORY:  HTN (hypertension)  Colon cancer      PAST SURGICAL HISTORY:  S/P colon resection  History of cholecystectomy      SOCIAL HISTORY:  - Denies EtOH abuse, smoking, IVDA    MEDICATIONS:      ALLERGIES:  No Known Allergies      VITALS & I/Os:  Vital Signs Last 24 Hrs  T(C): 36.8 (2022 03:25), Max: 36.8 (2022 03:25)  T(F): 98.2 (2022 03:25), Max: 98.2 (2022 03:25)  HR: 69 (2022 03:25) (54 - 69)  BP: 162/70 (2022 03:25) (150/57 - 163/59)  BP(mean): --  RR: 18 (2022 03:25) (16 - 18)  SpO2: 100% (2022 03:25) (99% - 100%)    I&O's Summary      PHYSICAL EXAM:  General; NAD  Respiratory: nonlabored breathing  Abdomen: soft, distended, minimally TTP in the mid abdomen. No rebound or guarding  Extremities: [facility]     LABS:                        12.1   7.44  )-----------( 272      ( 2022 21:08 )             36.9     07-02    140  |  108<H>  |  13  ----------------------------<  104<H>  4.4   |  17<L>  |  1.27    Ca    10.7<H>      2022 21:08    TPro  7.9  /  Alb  4.3  /  TBili  0.6  /  DBili  x   /  AST  17  /  ALT  9   /  AlkPhos  67  -    Lactate:  07- @ 21:08  1.0        Urinalysis Basic - ( 2022 21:08 )    Color: Yellow / Appearance: Clear / S.020 / pH: x  Gluc: x / Ketone: Negative  / Bili: Negative / Urobili: <2 mg/dL   Blood: x / Protein: 30 mg/dL / Nitrite: Negative   Leuk Esterase: Large / RBC: 5 /HPF / WBC Many /HPF   Sq Epi: x / Non Sq Epi: 1 /HPF / Bacteria: Negative      IMAGING:  < from: CT Abdomen and Pelvis w/ Oral Cont and w/ IV Cont (22 @ 01:43) >  FINDINGS:  Lungs: Minor dependent hypoventilatory changes in both lower lobes. Lung   bases  are otherwise clear. No pleural effusions.  Heart: Mild cardiomegaly with mitral annular calcification.    Liver: Unremarkable.  Gallbladder and bile ducts: The gallbladder is surgically absent. There   is no  intrahepatic biliary ductal dilatation. The common bile duct again   measures up  to 1 cm in diameter, within normal limits for age and post cholecystectomy  state.  Pancreas: Unremarkable.  Spleen: Unremarkable. The spleen is normal in size.  Adrenal glands: Unremarkable.  Kidneys and ureters: Normal and symmetric renal enhancement. No   hydronephrosis  or hydroureter. Simple-appearing cortical cysts in both kidneys, the   largest  measuring up to 2.4 cm in the posterior left upper pole.  Stomach and bowel: Oral contrast has been administered which has reached   the  mid small bowel at the time of imaging. The stomach is not overtly   dilated.  There are air-fluid in air-contrast levels throughout nondilated small   bowel, a  pattern most suggestive of ileus. There are postsurgical changes from an  apparent previous partial colectomy with an ileocolic anastomosis in the   right  mid abdomen. There is liquid stool with air-fluid levels throughout the   colon,  possibly due to laxative effects. The colon is distended with the right   colon  measuring up to 8.9 cm in diameter. There is an abrupt transition point in  bowel caliber in the proximal sigmoid colon where there is a 3 cm x 2.8   cm x  3.6 cm fecalith or &quot;stool ball&quot; (see series 2, images 88-94).  Appendix: The appendix is not identified and is presumed surgically   absent.    Intraperitoneal space: Trace ascites. No fluid collection. No   pneumoperitoneum.  Retroperitoneal space: Unremarkable. No retroperitoneal collection or   mass.  Vasculature: Moderate atherosclerotic vascular calcifications. Normal   caliber  abdominal aorta.  Lymph nodes: No pathologically enlarged lymph nodes.  Urinary bladder: Underdistended, which limits evaluation. No gross   abnormality.  Reproductive: Unremarkable as visualized.  Bones/joints: Degenerative changes. No suspicious osseous lesions.  Soft tissues: Small fat- and fluid-containing left inguinal hernia. Small  fat-containing right inguinal hernia. Midline postoperative scarring in   the  infraumbilical ventral abdominal wall.    IMPRESSION:  1. Large bowel obstruction secondary to a 3 cm x 2.8 cm x 3.6 cm fecalith   in  the proximal sigmoid colon.  2. Trace ascites, nonspecific.  3. Additional stable chronic and incidental findings are discussed in the   body  of the report.      < end of copied text >

## 2022-07-03 NOTE — ED ADULT NURSE REASSESSMENT NOTE - NS ED NURSE REASSESS COMMENT FT1
Break RN note- patient resting quietly in bed, breathing even and nonlabored. No acute distress. Patient appears comfortable. Awaiting CT scan. Safety maintained. Patient stable upon exiting the room.

## 2022-07-03 NOTE — ED ADULT NURSE REASSESSMENT NOTE - NS ED NURSE REASSESS COMMENT FT1
Report and pt received from RN Agapito, Pt A&Ox4 creole speaking. pt refused translation services prefers for family at bedside to translate. Respirations even and unlabored, sating 100% on RA, sinus bradycardia on monitor. Pt asymptomatic at this time. Denies any chest pain, dyspnea, dizziness or discomfort. Pt well appearing, NAD noted. Pending admission. bed in lowest position, side rails up, call bell in hand, safety maintained.

## 2022-07-03 NOTE — H&P ADULT - HISTORY OF PRESENT ILLNESS
Patient is a 85 y/o Creole-speaking F with a hx of colon ca s/p resection 20 yrs ago and HTN, s/p cholecystectomy presenting with constipation secondary to LBO with sigmoid fecalith.  Patient reports that at baseline, she was 2-3 BMs/day.  Two weeks ago, she had trouble passing stool.  Her PCP gave her stool softeners, which she took on Friday 7/1, but were ineffective.  At time of interview, patient was on bed pain after having been given saline enema.  Endorsed constipation, bloating, denied blood in stool, n/v.    Patient has most recently presented in 4/2022 for abdominal pain 2/2 SBO, which resolved spontaneously with PO clear fluids.    Surgery consult reccs include GI consult, NPO/IVF, no current need for acute intervention.  CT A/P in the ED showed LBO 2/2 fecalith in proximal sigmoid colon with R colon dilation.  Patient received NS enema in the ED and was able to pass some stool.  Patient also received 1g ceftriaxone, ondansetron, 30mg ketorolac, ondansetron, and Tylenol, placed on NPO.   Patient is a 85 y/o Creole-speaking F with a hx of colon ca s/p resection 20 yrs ago and HTN, s/p cholecystectomy presenting with constipation secondary to LBO with sigmoid fecalith.  Patient reports that at baseline, she was 2-3 BMs/day.  Two weeks ago, she had trouble passing stool.  Her PCP gave her stool softeners, which she took on Friday 7/1, but were ineffective.  At time of interview, patient was on bed pain after having been given saline enema.  Endorsed constipation, bloating, denied blood in stool, n/v.    Patient has most recently presented in 4/2022 for abdominal pain 2/2 enteritis, CT w/o evidence of obstruction.  Not admitted, as pt elected to go home after resolution of symptoms.    Surgery consult reccs include GI consult, NPO/IVF, no current need for acute intervention.  CT A/P in the ED showed LBO 2/2 fecalith in proximal sigmoid colon with R colon dilation.  Patient received NS enema in the ED and was able to pass some stool.  Patient also received 1g ceftriaxone, ondansetron, 30mg ketorolac, ondansetron, and Tylenol, placed on NPO.  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 Patient is a 85 y/o Creole-speaking F with a hx of colon ca s/p resection 20 yrs ago and HTN, s/p cholecystectomy presenting with constipation x2 weeks secondary to LBO with sigmoid fecalith.  Patient reports that at baseline, she was 2-3 BMs/day.  For the past 2 weeks, she passed gas but very little stool.  Her PCP gave her stool softeners, which she took on Friday 7/1, but were ineffective.  At time of interview, patient was on bed pain after having been given saline enema.  Endorsed constipation, bloating, denied blood in stool, n/v.    Patient has most recently presented in 4/2022 for abdominal pain 2/2 enteritis, CT w/o evidence of obstruction.  Not admitted, as pt elected to go home after resolution of symptoms.    Surgery consult reccs include GI consult, NPO/IVF, no current need for acute intervention.  CT A/P in the ED showed LBO 2/2 fecalith in proximal sigmoid colon with R colon dilation.  Patient received NS enema in the ED and was able to pass some stool.  Patient also received 1g ceftriaxone, ondansetron, 30mg ketorolac, ondansetron, and Tylenol, placed on NPO.  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

## 2022-07-03 NOTE — H&P ADULT - NSHPSOCIALHISTORY_GEN_ALL_CORE
Patient is retired and  and has 5 children.  Speaks [place] Creole, no English.  Lives with her daughter Nelda.  She is independent in her ADLs and iADLs, walks at baseline without assistance.  Remote history of smoking (30+ years ago).  No ETOH or substance use.

## 2022-07-03 NOTE — DISCHARGE NOTE PROVIDER - HOSPITAL COURSE
Patient is a 85 y/o F with hx of colon cancer s/p resection, cholecystectomy, HTN, GERD, presenting with constipation x2 weeks 2/2 LBO with fecalith in proximal sigmoid.  Patient was placed NPO and given NS enema, after which she passed 2 formed stools. Patient is a 87 y/o F with hx of colon cancer s/p resection, cholecystectomy, HTN, GERD, presenting with constipation x2 weeks 2/2 colonic stricture in sigmoid colon.  Fleet enema administration successfully yielded bowel movements.  CTAP showed colitis with colonic stricture at the sigmoid and also showed biliary duct dilation; flexible sigmoidoscopy confirmed severe stricture of unknown length near the anal verge, and biopsy was obtained.  MRCP showed stable CBD dilation without choledocholithiasis, likely 2/2 cholecystectomy.  Hospital course was complicated by NOMI 2/2 poor PO intake and JJ, which was treated with IVF and encouragement of PO intake.  Patient was deemed medically stable for discharge on miralax, senna, dulcolax, and home medication with atenolol held due to baseline bradycardia, for which patient will follow up with an outpatient cardiologist. Patient is a 87 y/o F with hx of colon cancer s/p resection, cholecystectomy, HTN, GERD, presenting with constipation x2 weeks, initially concerning for LBO and surgery was consulted. Initial CT A/P showed possible pneumatosis coli concerning for ischemic colitis, however, pt was HD stable, with benign abdominal exam, and negative lactate, and thus monitored off antibiotics given low clinical suspicion. She was maintained on bowel regimen with senna and miralax and given fleet enemas PRN BM's. Repeat CT A/P without evidence of ischemic colitis but did show stricture at sigmoid with CBD dilatation. GI performed flexible sigmoidoscopy, which confirmed stricture at sigmoid, appeared benign, and biopsies were taken. Given size of stricture, scope was not passed through, and colonoscopy was deferred. MRCP performed showed stable CBD dilation 2/2 previous cholecystectomy without evidence of choledocholithasis. Course was complicated by NOMI 2/2 poor PO intake and JJ, which resolved with IVF and increased PO intake. Pt is now optimized for discharge on bowel regimen with standing miralax and senna with dulcolax PRN. Pt to follow up with colorectal surgery and GI outpatient for biopsy results and further management.

## 2022-07-03 NOTE — ED ADULT NURSE REASSESSMENT NOTE - NS ED NURSE REASSESS COMMENT FT1
Report received from PARTH Smith. NAD noted. respirations even and unlabored on RA. pt. appears comfortable at this time. pending CTr.

## 2022-07-03 NOTE — CONSULT NOTE ADULT - ASSESSMENT
******INCOMPLETE******   83F Creole speaking with hx of HTN, cholecystectomy and colon ca s/p resection 20 years ago presents with LBO    Plan:       Plan to be discussed with colorectal surgery attending Dr. Abdelrahman Caba PGY-2   A Team Surgery  [id number]    83F Creole speaking with hx of HTN, cholecystectomy and colon ca s/p resection 20 years ago presents with LBO. Patient not completely obstructed, currently passing gas.     Plan:   - recommend medicine consult   - recommend GI consult   - NPO/IVF   - surgery will close follow       Plan discussed with [facility] attending Dr. Jose Caba PGY-2   A Team Surgery  [id number]    83F Creole speaking with hx of HTN, cholecystectomy and colon ca s/p resection 20 years ago presents with LBO. Patient not completely obstructed, currently passing gas.     Plan:   - recommend medicine admission  - recommend GI consult   - NPO/IVF   - surgery will close follow       Plan discussed with [facility] attending Dr. Jose Caba PGY-2   A Team Surgery  [id number]    83F Creole speaking with hx of HTN, cholecystectomy and colon ca s/p resection 20 years ago presents with LBO. Patient not completely obstructed, currently passing gas.     Plan:   - recommend medicine admission  - recommend GI consult   - NPO/IVF   - surgery will close follow       Plan discussed with [facility] attending Dr. Jose Caba PGY-2   B Team Surgery [id number]

## 2022-07-03 NOTE — H&P ADULT - ATTENDING COMMENTS
86F HTN p/w partial LBO, responding to enemas  --appreciate surgery recs  --passing stools, can advance diet  --if patient without pain and tolerating diet patient may be discharged  --d/c time 40 min coordinating care 86F HTN p/w partial LBO, responding to enemas  --appreciate surgery recs  --passing stools  --final CT read concerning for ischemic bowel and air--f/u further surgery recs

## 2022-07-03 NOTE — H&P ADULT - PROBLEM SELECTOR PLAN 1
CTAP: LBO with fecalith in proximal sigmoid  - f/u NS enema  - NPO  - GI, surgery following, appreciate recs

## 2022-07-03 NOTE — H&P ADULT - NSICDXPASTMEDICALHX_GEN_ALL_CORE_FT
PAST MEDICAL HISTORY:  Colon cancer     GERD (gastroesophageal reflux disease)     HTN (hypertension)

## 2022-07-03 NOTE — DISCHARGE NOTE PROVIDER - NSDCMRMEDTOKEN_GEN_ALL_CORE_FT
amlodipine-benazepril 10 mg-20 mg oral capsule: 1 cap(s) orally once a day  atenolol 50 mg oral tablet: 1 tab(s) orally once a day  pancrelipase 10,000 units-30,000 units-30,000 units oral delayed release capsule: 1 dose(s) orally once a day  pantoprazole 40 mg oral delayed release tablet: 1 tab(s) orally once a day  Vitamin D3 400 intl units (10 mcg) oral tablet: 1 tab(s) orally once a day   amlodipine-benazepril 10 mg-20 mg oral capsule: 1 cap(s) orally once a day  bisacodyl 5 mg oral delayed release tablet: 1 tab(s) orally once a day (at bedtime)  pancrelipase 10,000 units-30,000 units-30,000 units oral delayed release capsule: 1 dose(s) orally once a day  pantoprazole 40 mg oral delayed release tablet: 1 tab(s) orally once a day  polyethylene glycol 3350 oral powder for reconstitution: 17 gram(s) orally 2 times a day  senna leaf extract oral tablet: 2 tab(s) orally once a day (at bedtime)  Vitamin D3 400 intl units (10 mcg) oral tablet: 1 tab(s) orally once a day   amlodipine-benazepril 10 mg-20 mg oral capsule: 1 cap(s) orally once a day  bisacodyl 5 mg oral delayed release tablet: 1 tab(s) orally once a day (at bedtime), As Needed   pancrelipase 10,000 units-30,000 units-30,000 units oral delayed release capsule: 1 dose(s) orally once a day  pantoprazole 40 mg oral delayed release tablet: 1 tab(s) orally once a day  polyethylene glycol 3350 oral powder for reconstitution: 17 gram(s) orally 1 to 2 times a day  senna (sennosides) 8.6 mg oral tablet: 2 tab(s) orally once a day (at bedtime)  Vitamin D3 400 intl units (10 mcg) oral tablet: 1 tab(s) orally once a day

## 2022-07-03 NOTE — H&P ADULT - NSHPLABSRESULTS_GEN_ALL_CORE
12.1   7.44  )-----------( 272      ( 2022 21:08 )             36.9   07-02    140  |  108<H>  |  13  ----------------------------<  104<H>  4.4   |  17<L>  |  1.27    Ca    10.7<H>      2022 21:08    LIVER FUNCTIONS - ( 2022 21:08 )  Alb: 4.3 g/dL / Pro: 7.9 g/dL / ALK PHOS: 67 U/L / ALT: 9 U/L / AST: 17 U/L / GGT: x             TPro  7.9  /  Alb  4.3  /  TBili  0.6  /  DBili  x   /  AST  17  /  ALT  9   /  AlkPhos  67  07-02  VBG - ( 2022 21:08 )  pH: 7.29  /  pCO2: 51    /  pO2: 24    / HCO3: 24    / Base Excess: -2.6  /  SvO2: 30.5  / Lactate: 1.0        Urinalysis Basic - ( 2022 21:08 )    Color: Yellow / Appearance: Clear / S.020 / pH: x  Gluc: x / Ketone: Negative  / Bili: Negative / Urobili: <2 mg/dL   Blood: x / Protein: 30 mg/dL / Nitrite: Negative   Leuk Esterase: Large / RBC: 5 /HPF / WBC Many /HPF   Sq Epi: x / Non Sq Epi: 1 /HPF / Bacteria: Negative    COVID (-)    7/3/2022 CT A/P  IMPRESSION:  1. Large bowel obstruction secondary to a 3 cm x 2.8 cm x 3.6 cm fecalith   in  the proximal sigmoid colon.  2. Trace ascites, nonspecific.  3. Additional stable chronic and incidental findings are discussed in the   body  of the report.        ******PRELIMINARY REPORT******      ******PRELIMINARY REPORT******         NATHAN REYEZ M.D.;[facility] RADIOLOGIST  This document is a PRELIMINARY interpretation and is pending final   attending approval. Jul  3 2022  5:24AM

## 2022-07-03 NOTE — H&P ADULT - NSHPPHYSICALEXAM_GEN_ALL_CORE
General: NAD, non-toxic appearing, lying in bed on top of bedpan after enema administration  HEENT: normocephalic, atraumatic, MMM, anicteric sclera, PERRL, no conjunctival or nasal discharge  CV: RRR, no m/r/g  Pulm: CTAB on anterior exam  GI: soft, globose, distended, mass in LLQ, no ttp  Extremities: 2+ radial pulses, no edema in LE  Skin: skin tags observed in L groin  Neuro: no focal deficits, moving all 4 extremities  Psych: AOx3, appropriate affect

## 2022-07-03 NOTE — CONSULT NOTE ADULT - ATTENDING COMMENTS
86y F, hx of colon cancer s/p resection, hx of SBO, admitted now w/ constipation, abd pain, bloating x 2 wks.   Admission CT abd/pelvis showing possible ileus, distended right colon, LBO 2/2 sigmoid colon fecalith.    Please order enemas to try to mobilize fecalith   Recommend surgery c/s if enemas are unsuccessful  Serial abdominal exams and xray    GI to follow, please call with questions.
I have reviewed the history, pertinent labs and imaging, and discussed the care with the consult resident.  More than 50% of this 55 minute encounter including face to face with the patient was spent counseling and/or coordination of care on constipation.  Time included review of vitals, labs, imaging, discussion with consultants and coordination with the operating room/emergency department.    87 yo F h/o colectomy for colon ca 20 y ago presents with constipation. Pt reports passing flatus. CT with distended colon to the level of the sigmoid where there are large fecaliths. Pt is not clinically obstructed. No urgent indication for operative intervention at this time.     - Enemas and miralax   - Consider colonoscopy after resolution of constipation to eval for etiology   - Will likely benefit from daily miralax to prevent recurrent episodes    The active issues are:  1. constipation     The Acute Care Surgery (B Team) Attending Group Practice:  Dr. Mirtha Garcia    urgent issues - spectra [id number]  nonurgent issues - [phone]  patient appointments or afterhours - [phone]

## 2022-07-03 NOTE — DISCHARGE NOTE PROVIDER - NSDCCPTREATMENT_GEN_ALL_CORE_FT
PRINCIPAL PROCEDURE  Procedure: Flexible sigmoidoscopy  Findings and Treatment: You had a procedure that examined the end of your colon using a camera, which found narrowing in a part of your colon called the sigmoid.  The results are below:  Findings:       The perianal and digital rectal examinations were normal.       A benign-appearing, intrinsic severe stenosis at 18cm from anal verge, measuring of unknown length was found in the sigmoid colon and was non-traversed using the gastroscope (9.2mm outer diameter). Biopsies were taken with a cold forceps for histology.       The exam was otherwise normal throughout the examined colon.                                                                                   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 Suspect benign etiology (possibly diverticular). Biopsied.        SECONDARY PROCEDURE  Procedure: CT abdomen pelvis  Findings and Treatment: You had two CT scans of your abdomen to determine the cause of your constipation.  The most recent identified a stricture and gallbaldder duct dilation.  The results are below:  FINDINGS:  LOWER CHEST: Within normal limits.  LIVER: Within normal limits.  BILE DUCTS: Stable extrahepatic biliary ductal dilatation with distal   tapering, which may be related to the cholecystectomy state.  GALLBLADDER: Cholecystectomy.  SPLEEN: Within normal limits.  PANCREAS: Within normal limits.  ADRENALS: Within normal limits.  KIDNEYS/URETERS: Bilateral renal cysts. No hydronephrosis.  BLADDER: Within normal limits.  REPRODUCTIVE ORGANS: Calcified uterine fibroids.  BOWEL: No bowel obstruction. Right hemicolectomy. Unchanged mild wall thickening of the descending colon and sigmoid colon. Persistent luminal narrowing in the mid sigmoid colon. Decreased colonic distention. No evidence of pneumatosis. Increased colonic stool burden throughout the colon.  PERITONEUM: Trace pelvic fluid.  VESSELS: Atherosclerotic changes.  RETROPERITONEUM/LYMPH NODES: No lymphadenopathy.  ABDOMINAL WALL: Small fat-containing bilateral inguinal hernias.  BONES: Degenerative changes.  IMPRESSION:  Redemonstration of colitis involving descending and sigmoid colon. No colonic pneumatosis. Persistent luminal narrowing in the mid sigmoid colon. Underlying benign or malignant stricture cannot be excluded.

## 2022-07-04 LAB
ANION GAP SERPL CALC-SCNC: 11 MMOL/L — SIGNIFICANT CHANGE UP (ref 7–14)
BUN SERPL-MCNC: 13 MG/DL — SIGNIFICANT CHANGE UP (ref 7–23)
CALCIUM SERPL-MCNC: 10 MG/DL — SIGNIFICANT CHANGE UP (ref 8.4–10.5)
CHLORIDE SERPL-SCNC: 106 MMOL/L — SIGNIFICANT CHANGE UP (ref 98–107)
CO2 SERPL-SCNC: 23 MMOL/L — SIGNIFICANT CHANGE UP (ref 22–31)
CREAT SERPL-MCNC: 1.08 MG/DL — SIGNIFICANT CHANGE UP (ref 0.5–1.3)
EGFR: 50 ML/MIN/1.73M2 — LOW
GLUCOSE SERPL-MCNC: 76 MG/DL — SIGNIFICANT CHANGE UP (ref 70–99)
HCT VFR BLD CALC: 35.6 % — SIGNIFICANT CHANGE UP (ref 34.5–45)
HGB BLD-MCNC: 11.8 G/DL — SIGNIFICANT CHANGE UP (ref 11.5–15.5)
MAGNESIUM SERPL-MCNC: 1.8 MG/DL — SIGNIFICANT CHANGE UP (ref 1.6–2.6)
MCHC RBC-ENTMCNC: 29.8 PG — SIGNIFICANT CHANGE UP (ref 27–34)
MCHC RBC-ENTMCNC: 33.1 GM/DL — SIGNIFICANT CHANGE UP (ref 32–36)
MCV RBC AUTO: 89.9 FL — SIGNIFICANT CHANGE UP (ref 80–100)
NRBC # BLD: 0 /100 WBCS — SIGNIFICANT CHANGE UP
NRBC # FLD: 0 K/UL — SIGNIFICANT CHANGE UP
PHOSPHATE SERPL-MCNC: 3.2 MG/DL — SIGNIFICANT CHANGE UP (ref 2.5–4.5)
PLATELET # BLD AUTO: 268 K/UL — SIGNIFICANT CHANGE UP (ref 150–400)
POTASSIUM SERPL-MCNC: 4 MMOL/L — SIGNIFICANT CHANGE UP (ref 3.5–5.3)
POTASSIUM SERPL-SCNC: 4 MMOL/L — SIGNIFICANT CHANGE UP (ref 3.5–5.3)
RBC # BLD: 3.96 M/UL — SIGNIFICANT CHANGE UP (ref 3.8–5.2)
RBC # FLD: 13.4 % — SIGNIFICANT CHANGE UP (ref 10.3–14.5)
SODIUM SERPL-SCNC: 140 MMOL/L — SIGNIFICANT CHANGE UP (ref 135–145)
WBC # BLD: 4.8 K/UL — SIGNIFICANT CHANGE UP (ref 3.8–10.5)
WBC # FLD AUTO: 4.8 K/UL — SIGNIFICANT CHANGE UP (ref 3.8–10.5)

## 2022-07-04 PROCEDURE — 99233 SBSQ HOSP IP/OBS HIGH 50: CPT | Mod: GC

## 2022-07-04 PROCEDURE — 99232 SBSQ HOSP IP/OBS MODERATE 35: CPT | Mod: GC

## 2022-07-04 NOTE — PROGRESS NOTE ADULT - SUBJECTIVE AND OBJECTIVE BOX
Progress Note    22 (1d)    Patient is a 86y old  Female who presents with a chief complaint of large bowel obstruction with fecalith (2022 06:55)      Subjective / Overnight Events :  - No acute events overnight.  - Pt seen and examined at bedside.     Additional ROS (if any):    MEDICATIONS  (STANDING):  lactated ringers. 1000 milliLiter(s) (75 mL/Hr) IV Continuous <Continuous>    MEDICATIONS  (PRN):  aluminum hydroxide/magnesium hydroxide/simethicone Suspension 30 milliLiter(s) Oral every 4 hours PRN Dyspepsia  melatonin 3 milliGRAM(s) Oral at bedtime PRN Insomnia  ondansetron Injectable 4 milliGRAM(s) IV Push every 8 hours PRN Nausea and/or Vomiting      CAPILLARY BLOOD GLUCOSE        I&O's Summary      PHYSICAL EXAM:  Vital Signs Last 24 Hrs  T(C): 36.6 (2022 05:22), Max: 36.9 (2022 16:54)  T(F): 97.9 (2022 05:22), Max: 98.4 (2022 16:54)  HR: 62 (2022 05:22) (54 - 62)  BP: 151/64 (2022 05:22) (141/66 - 165/72)  BP(mean): --  RR: 17 (2022 05:22) (16 - 18)  SpO2: 100% (2022 05:22) (97% - 100%)    General: NAD, non-toxic appearing   HEENT: PERRLA, EOMi, no scleral icterus  CV: RRR, normal S1 and S2, no m/r/g  Lungs: normal respiratory effort. CTAB, no wheezes, rales, or rhonchi  Abd: soft, nontender, nondistended  Ext: no edema, 2+ peripheral pulses   Pysch: AAOx3, appropriate affect   Neuro: grossly non-focal, moving all extremities spontaneously   Skin: no rashes or lesions     LABS:                          11.8   4.80  )-----------( 268      ( 2022 07:30 )             35.6       WBC Trend: 4.80<--, 7.44<--  Hb Trend: 11.8<--, 12.1<--    07-02    140  |  108<H>  |  13  ----------------------------<  104<H>  4.4   |  17<L>  |  1.27    Ca    10.7<H>      2022 21:08    TPro  7.9  /  Alb  4.3  /  TBili  0.6  /  DBili  x   /  AST  17  /  ALT  9   /  AlkPhos  67  07-02          Urinalysis Basic - ( 2022 21:08 )    Color: Yellow / Appearance: Clear / S.020 / pH: x  Gluc: x / Ketone: Negative  / Bili: Negative / Urobili: <2 mg/dL   Blood: x / Protein: 30 mg/dL / Nitrite: Negative   Leuk Esterase: Large / RBC: 5 /HPF / WBC Many /HPF   Sq Epi: x / Non Sq Epi: 1 /HPF / Bacteria: Negative        Culture - Urine (collected 2022 21:05)  Source: Clean Catch Clean Catch (Midstream)  Final Report (2022 22:03):    <10,000 CFU/mL Normal Urogenital Tanika          RADIOLOGY & ADDITIONAL TESTS: Reviewed Progress Note    22 (1d)    Patient is a 86y old  Female who presents with a chief complaint of large bowel obstruction with fecalith (2022 06:55)      Subjective / Overnight Events :  - No acute events overnight.  - Pt seen and examined at bedside.  Feels some general abdominal pain.      Additional ROS (if any):    MEDICATIONS  (STANDING):  lactated ringers. 1000 milliLiter(s) (75 mL/Hr) IV Continuous <Continuous>    MEDICATIONS  (PRN):  aluminum hydroxide/magnesium hydroxide/simethicone Suspension 30 milliLiter(s) Oral every 4 hours PRN Dyspepsia  melatonin 3 milliGRAM(s) Oral at bedtime PRN Insomnia  ondansetron Injectable 4 milliGRAM(s) IV Push every 8 hours PRN Nausea and/or Vomiting      CAPILLARY BLOOD GLUCOSE        I&O's Summary      PHYSICAL EXAM:  Vital Signs Last 24 Hrs  T(C): 36.6 (2022 05:22), Max: 36.9 (2022 16:54)  T(F): 97.9 (2022 05:22), Max: 98.4 (2022 16:54)  HR: 62 (2022 05:22) (54 - 62)  BP: 151/64 (2022 05:22) (141/66 - 165/72)  BP(mean): --  RR: 17 (2022 05:22) (16 - 18)  SpO2: 100% (2022 05:22) (97% - 100%)    General: NAD, non-toxic appearing, lying in bed on top of bedpan after enema administration  HEENT: normocephalic, atraumatic, MMM, anicteric sclera, PERRL, no conjunctival or nasal discharge  CV: RRR, no m/r/g  Pulm: CTAB on anterior exam, no w/r/r  GI: soft, globose, non-distended, no mass appreciated, no ttp or guarding  Extremities: 2+ radial pulses, no edema in LE  Skin: skin tags observed in L groin  Neuro: no focal deficits, moving all 4 extremities  Psych: AOx3, appropriate affect    LABS:                          11.8   4.80  )-----------( 268      ( 2022 07:30 )             35.6       WBC Trend: 4.80<--, 7.44<--  Hb Trend: 11.8<--, 12.1<--    07-02    140  |  108<H>  |  13  ----------------------------<  104<H>  4.4   |  17<L>  |  1.27    Ca    10.7<H>      2022 21:08    TPro  7.9  /  Alb  4.3  /  TBili  0.6  /  DBili  x   /  AST  17  /  ALT  9   /  AlkPhos  67  07-          Urinalysis Basic - ( 2022 21:08 )    Color: Yellow / Appearance: Clear / S.020 / pH: x  Gluc: x / Ketone: Negative  / Bili: Negative / Urobili: <2 mg/dL   Blood: x / Protein: 30 mg/dL / Nitrite: Negative   Leuk Esterase: Large / RBC: 5 /HPF / WBC Many /HPF   Sq Epi: x / Non Sq Epi: 1 /HPF / Bacteria: Negative        Culture - Urine (collected 2022 21:05)  Source: Clean Catch Clean Catch (Midstream)  Final Report (2022 22:03):    <10,000 CFU/mL Normal Urogenital Tanika          RADIOLOGY & ADDITIONAL TESTS: Reviewed    2022 CTAP  IMPRESSION:    Nonobstructed bowel. Nonspecific colitis from splenic flexure through   sigmoid colon most severe at the sigmoid colon where there could be an   inflammatory stricture. Possible pneumatosis at splenic flexure raises   concern for ischemic colitis    --- End of Report ---    STUART TALAVERA MD; Attending Radiologist  This document has been electronically signed. Jul  3 2022  4:27PM

## 2022-07-04 NOTE — PHYSICAL THERAPY INITIAL EVALUATION ADULT - ADDITIONAL COMMENTS
[place]  used to help assist with translation.  Pt reports living in a house, 4 steps to enter, with her children who come often.  Pt is independent at baseline.  Pt has stairs inside the home to get to the basement which she seldomly goes to.  No history of falls.

## 2022-07-04 NOTE — PROGRESS NOTE ADULT - ASSESSMENT
JORGE EDWARDS is a 86y F, Creole speaking, with h/o colon cancer s/p partial colectomy with ileocolic anastomosis (~20 yrs PTA, no chemoRT), SBO 2017 s/p medical management, HTN, prior CCY, and GERD who initially presented to the ED with complaints of worsening constipation x 2 weeks, abdominal pain and bloating, noted to have a LBO on CT for which GI was consulted.      # ?LBO  - prior CT reported fecalith LBO, however, today the CT read said no findings of LBO and reporting L sided colon wall thickening with narrowing concerning for stricture and possible pneumatosis vs stercoral colitis  # h/o colon cancer s/p resection  # biliary ductal dilatation with liver chemistries wnl - post CCY changes?  - CTAP: Nondistended small bowel loops. Right hemicolectomy. Mild distention of hepatic flexure measuring up to 6.6 cm. Remainder of the colon is not distended. Air-fluid level within the colon suggest diarrhea illness. Wall thickening of the splenic flexure, descending and sigmoid colon greatest involving the mid sigmoid colon where there is luminal narrowing and surrounding inflammation. Possible mild pneumatosis coli involving splenic flexure versus air trapped between colon wall and stool. And  central intrahepatic and extrahepatic biliary dilation. Common bile duct maximally measures 1.1 cm however it appears to taper distally. Possible hypodensity suggested within the distal CBD suggestive of choledocholithiasis.    Recommendations:  - Unclear whether patient has a stricture and may benefit from colonoscopy for further evaluation at some point if there is not pneumatosis coli  - Give multiple enemas to allow for BMs, and then repeat imaging r/o pneumatosis coli  - If negative for pneumatosis coli, will consider colonoscopy, otherwise would obtain surgical input  - Surgery following  - Closely monitor for worsening sx or decline in condition  - Serial abdominal exams with Xray  - Daily CBC, BMP, coags  - Consider MRCP  - Ensure adequate hydration  - Antiemetics as needed  - Pain control, avoid NSAIDs and opioids  - Rest of care per primary    Preliminary note until signed by Attending.    Thank you for involving us in this patient's care.    Latoya Harrison MD  Gastroenterology/Hepatology Fellow, PGY-VI    NON-URGENT CONSULTS:  Please email [email or web address]  [email or web address]  AT NIGHT AND ON WEEKENDS:  Contact on-call GI fellow via answering service ([phone]) from 5pm-8am and on weekends/holidays  MONDAY-FRIDAY 8AM-5PM:  Pager# [phone] ([facility])  GI Phone# [phone] ([facility])

## 2022-07-04 NOTE — PHYSICAL THERAPY INITIAL EVALUATION ADULT - PERTINENT HX OF CURRENT PROBLEM, REHAB EVAL
Patient is a 85 y/o Creole-speaking F with a hx of colon ca s/p resection 20 yrs ago and HTN, s/p cholecystectomy presenting with constipation x2 weeks secondary to LBO with sigmoid fecalith.  Patient reports that at baseline, she was 2-3 BMs/day.  For the past 2 weeks, she passed gas but very little stool.

## 2022-07-04 NOTE — PROGRESS NOTE ADULT - SUBJECTIVE AND OBJECTIVE BOX
Chief Complaint:  Patient is a 86y old  Female who presents with a chief complaint of large bowel obstruction with fecalith (03 Jul 2022 12:10)      Reason for consult:    Interval Events:     Hospital Medications:  aluminum hydroxide/magnesium hydroxide/simethicone Suspension 30 milliLiter(s) Oral every 4 hours PRN  lactated ringers. 1000 milliLiter(s) IV Continuous <Continuous>  melatonin 3 milliGRAM(s) Oral at bedtime PRN  ondansetron Injectable 4 milliGRAM(s) IV Push every 8 hours PRN      ROS:   General:  No  fevers, chills, night sweats, fatigue  Eyes:  Good vision, no reported pain  ENT:  No sore throat, pain, runny nose  CV:  No pain, palpitations  Pulm:  No dyspnea, cough  GI:  See HPI, otherwise negative  :  No  incontinence, nocturia  Muscle:  No pain, weakness  Neuro:  No memory problems  Psych:  No insomnia, mood problems, depression  Endocrine:  No polyuria, polydipsia, cold/heat intolerance  Heme:  No petechiae, ecchymosis, easy bruisability  Skin:  No rash    PHYSICAL EXAM:   Vital Signs:  Vital Signs Last 24 Hrs  T(C): 36.6 (04 Jul 2022 05:22), Max: 36.9 (03 Jul 2022 16:54)  T(F): 97.9 (04 Jul 2022 05:22), Max: 98.4 (03 Jul 2022 16:54)  HR: 62 (04 Jul 2022 05:22) (54 - 62)  BP: 151/64 (04 Jul 2022 05:22) (141/66 - 165/72)  BP(mean): --  RR: 17 (04 Jul 2022 05:22) (16 - 18)  SpO2: 100% (04 Jul 2022 05:22) (97% - 100%)  Daily Height in cm: 165.1 (03 Jul 2022 18:00)    Daily     GENERAL: no acute distress  NEURO: alert  HEENT: anicteric sclera, no conjunctival pallor appreciated  CHEST: no respiratory distress, no accessory muscle use  CARDIAC: regular rate, rhythm  ABDOMEN: soft, non-tender, non-distended, no rebound or guarding  EXTREMITIES: warm, well perfused, no edema  SKIN: no lesions noted    LABS: reviewed                        12.1   7.44  )-----------( 272      ( 02 Jul 2022 21:08 )             36.9     07-02    140  |  108<H>  |  13  ----------------------------<  104<H>  4.4   |  17<L>  |  1.27    Ca    10.7<H>      02 Jul 2022 21:08    TPro  7.9  /  Alb  4.3  /  TBili  0.6  /  DBili  x   /  AST  17  /  ALT  9   /  AlkPhos  67  07-02    LIVER FUNCTIONS - ( 02 Jul 2022 21:08 )  Alb: 4.3 g/dL / Pro: 7.9 g/dL / ALK PHOS: 67 U/L / ALT: 9 U/L / AST: 17 U/L / GGT: x             Interval Diagnostic Studies: see sunrise for full report   Chief Complaint:  Patient is a 86y old  Female who presents with a chief complaint of large bowel obstruction with fecalith (03 Jul 2022 12:10)    Interval Events:   Had large BM last night per patient, after enema  Abdomen soft, tender with deep palpation and less distended    CTAP from yesterday with a different read today:  Nondistended small bowel loops. Right hemicolectomy. Mild distention of hepatic flexure measuring up to 6.6 cm. Remainder of the colon is not distended. Air-fluid level within the colon suggest diarrhea illness. Wall thickening of the splenic flexure, descending and sigmoid colon greatest involving the mid sigmoid colon where there is luminal narrowing and surrounding inflammation. Possible mild pneumatosis coli involving splenic flexure versus air trapped between colon wall and stool. And  central intrahepatic and extrahepatic biliary dilation. Common bile duct maximally measures 1.1 cm however it appears to taper distally. Possible hypodensity suggested within the distal CBD suggestive of choledocholithiasis.    Liver chemistries wnl.      Hospital Medications:  aluminum hydroxide/magnesium hydroxide/simethicone Suspension 30 milliLiter(s) Oral every 4 hours PRN  lactated ringers. 1000 milliLiter(s) IV Continuous <Continuous>  melatonin 3 milliGRAM(s) Oral at bedtime PRN  ondansetron Injectable 4 milliGRAM(s) IV Push every 8 hours PRN      ROS:   General:  No  fevers, chills, night sweats, fatigue    PHYSICAL EXAM:   Vital Signs:  Vital Signs Last 24 Hrs  T(C): 36.6 (04 Jul 2022 05:22), Max: 36.9 (03 Jul 2022 16:54)  T(F): 97.9 (04 Jul 2022 05:22), Max: 98.4 (03 Jul 2022 16:54)  HR: 62 (04 Jul 2022 05:22) (54 - 62)  BP: 151/64 (04 Jul 2022 05:22) (141/66 - 165/72)  BP(mean): --  RR: 17 (04 Jul 2022 05:22) (16 - 18)  SpO2: 100% (04 Jul 2022 05:22) (97% - 100%)  Daily Height in cm: 165.1 (03 Jul 2022 18:00)    Daily     GENERAL: no acute distress  NEURO: alert  HEENT: anicteric sclera, no conjunctival pallor appreciated  CHEST: no respiratory distress, no accessory muscle use  CARDIAC: regular rate, rhythm  ABDOMEN: soft, mildly tender, mildly distended, no rebound or guarding  EXTREMITIES: warm, well perfused, no edema  SKIN: no lesions noted    LABS: reviewed                        12.1   7.44  )-----------( 272      ( 02 Jul 2022 21:08 )             36.9     07-02    140  |  108<H>  |  13  ----------------------------<  104<H>  4.4   |  17<L>  |  1.27    Ca    10.7<H>      02 Jul 2022 21:08    TPro  7.9  /  Alb  4.3  /  TBili  0.6  /  DBili  x   /  AST  17  /  ALT  9   /  AlkPhos  67  07-02    LIVER FUNCTIONS - ( 02 Jul 2022 21:08 )  Alb: 4.3 g/dL / Pro: 7.9 g/dL / ALK PHOS: 67 U/L / ALT: 9 U/L / AST: 17 U/L / GGT: x             Interval Diagnostic Studies: see sunrise for full report

## 2022-07-05 LAB
ANION GAP SERPL CALC-SCNC: 10 MMOL/L — SIGNIFICANT CHANGE UP (ref 7–14)
APTT BLD: 30.5 SEC — SIGNIFICANT CHANGE UP (ref 27–36.3)
BUN SERPL-MCNC: 12 MG/DL — SIGNIFICANT CHANGE UP (ref 7–23)
CALCIUM SERPL-MCNC: 10.3 MG/DL — SIGNIFICANT CHANGE UP (ref 8.4–10.5)
CHLORIDE SERPL-SCNC: 106 MMOL/L — SIGNIFICANT CHANGE UP (ref 98–107)
CO2 SERPL-SCNC: 25 MMOL/L — SIGNIFICANT CHANGE UP (ref 22–31)
CREAT SERPL-MCNC: 1.06 MG/DL — SIGNIFICANT CHANGE UP (ref 0.5–1.3)
EGFR: 51 ML/MIN/1.73M2 — LOW
GLUCOSE SERPL-MCNC: 87 MG/DL — SIGNIFICANT CHANGE UP (ref 70–99)
HCT VFR BLD CALC: 32.7 % — LOW (ref 34.5–45)
HGB BLD-MCNC: 10.9 G/DL — LOW (ref 11.5–15.5)
INR BLD: 1.14 RATIO — SIGNIFICANT CHANGE UP (ref 0.88–1.16)
MAGNESIUM SERPL-MCNC: 1.8 MG/DL — SIGNIFICANT CHANGE UP (ref 1.6–2.6)
MCHC RBC-ENTMCNC: 29.8 PG — SIGNIFICANT CHANGE UP (ref 27–34)
MCHC RBC-ENTMCNC: 33.3 GM/DL — SIGNIFICANT CHANGE UP (ref 32–36)
MCV RBC AUTO: 89.3 FL — SIGNIFICANT CHANGE UP (ref 80–100)
NRBC # BLD: 0 /100 WBCS — SIGNIFICANT CHANGE UP
NRBC # FLD: 0 K/UL — SIGNIFICANT CHANGE UP
PHOSPHATE SERPL-MCNC: 2.4 MG/DL — LOW (ref 2.5–4.5)
PLATELET # BLD AUTO: 271 K/UL — SIGNIFICANT CHANGE UP (ref 150–400)
POTASSIUM SERPL-MCNC: 4 MMOL/L — SIGNIFICANT CHANGE UP (ref 3.5–5.3)
POTASSIUM SERPL-SCNC: 4 MMOL/L — SIGNIFICANT CHANGE UP (ref 3.5–5.3)
PROTHROM AB SERPL-ACNC: 13.2 SEC — SIGNIFICANT CHANGE UP (ref 10.5–13.4)
RBC # BLD: 3.66 M/UL — LOW (ref 3.8–5.2)
RBC # FLD: 13.3 % — SIGNIFICANT CHANGE UP (ref 10.3–14.5)
SODIUM SERPL-SCNC: 141 MMOL/L — SIGNIFICANT CHANGE UP (ref 135–145)
WBC # BLD: 4.12 K/UL — SIGNIFICANT CHANGE UP (ref 3.8–10.5)
WBC # FLD AUTO: 4.12 K/UL — SIGNIFICANT CHANGE UP (ref 3.8–10.5)

## 2022-07-05 PROCEDURE — 99233 SBSQ HOSP IP/OBS HIGH 50: CPT | Mod: GC

## 2022-07-05 PROCEDURE — 74019 RADEX ABDOMEN 2 VIEWS: CPT | Mod: 26

## 2022-07-05 PROCEDURE — 99222 1ST HOSP IP/OBS MODERATE 55: CPT | Mod: GC

## 2022-07-05 RX ORDER — ENOXAPARIN SODIUM 100 MG/ML
40 INJECTION SUBCUTANEOUS EVERY 24 HOURS
Refills: 0 | Status: DISCONTINUED | OUTPATIENT
Start: 2022-07-05 | End: 2022-07-10

## 2022-07-05 RX ORDER — POLYETHYLENE GLYCOL 3350 17 G/17G
17 POWDER, FOR SOLUTION ORAL
Refills: 0 | Status: DISCONTINUED | OUTPATIENT
Start: 2022-07-05 | End: 2022-07-10

## 2022-07-05 RX ORDER — PANTOPRAZOLE SODIUM 20 MG/1
40 TABLET, DELAYED RELEASE ORAL
Refills: 0 | Status: DISCONTINUED | OUTPATIENT
Start: 2022-07-05 | End: 2022-07-10

## 2022-07-05 RX ORDER — AMLODIPINE BESYLATE 2.5 MG/1
10 TABLET ORAL DAILY
Refills: 0 | Status: DISCONTINUED | OUTPATIENT
Start: 2022-07-05 | End: 2022-07-06

## 2022-07-05 RX ADMIN — POLYETHYLENE GLYCOL 3350 17 GRAM(S): 17 POWDER, FOR SOLUTION ORAL at 18:37

## 2022-07-05 RX ADMIN — ENOXAPARIN SODIUM 40 MILLIGRAM(S): 100 INJECTION SUBCUTANEOUS at 14:15

## 2022-07-05 RX ADMIN — Medication 1 ENEMA: at 12:44

## 2022-07-05 RX ADMIN — AMLODIPINE BESYLATE 10 MILLIGRAM(S): 2.5 TABLET ORAL at 14:14

## 2022-07-05 NOTE — PROGRESS NOTE ADULT - ASSESSMENT
Patient is a 85 y/o Creole-speaking F with hx of colon ca s/p resection 20 yrs ago, s/p cholecystectomy, HTN, and GERD presenting with constipation 2/2 LBO.  Preliminary CTAP shows fecalith in proximal sigmoid colon; patient was passing stool following NS enema administration.  Surgery and GI currently following.  Patient currently NPO. Patient is a 87 y/o Creole-speaking F with hx of colon ca s/p resection 20 yrs ago, s/p cholecystectomy, HTN, and GERD presenting with constipation 2/2 LBO.  7/3 CTAP with possible pneumatosis with ischemic colitis.  Patient with benign abdominal exam, normal lactate, afebrile, at baseline mental status and not meeting sepsis criteria.  Due to benign clinical presentation and lab work, reassessing imaging.

## 2022-07-05 NOTE — PROGRESS NOTE ADULT - SUBJECTIVE AND OBJECTIVE BOX
Chief Complaint:  Patient is a 86y old  Female who presents with a chief complaint of large bowel obstruction with fecalith (04 Jul 2022 08:09)      Reason for consult:    Interval Events:     Hospital Medications:  aluminum hydroxide/magnesium hydroxide/simethicone Suspension 30 milliLiter(s) Oral every 4 hours PRN  lactated ringers. 1000 milliLiter(s) IV Continuous <Continuous>  melatonin 3 milliGRAM(s) Oral at bedtime PRN  ondansetron Injectable 4 milliGRAM(s) IV Push every 8 hours PRN      ROS:   Complete and normal except as mentioned above.    PHYSICAL EXAM:   Vital Signs:  Vital Signs Last 24 Hrs  T(C): 36.4 (05 Jul 2022 05:16), Max: 36.8 (04 Jul 2022 21:45)  T(F): 97.5 (05 Jul 2022 05:16), Max: 98.3 (04 Jul 2022 21:45)  HR: 60 (05 Jul 2022 05:16) (60 - 60)  BP: 152/66 (05 Jul 2022 05:16) (125/90 - 154/60)  BP(mean): --  RR: 17 (05 Jul 2022 05:16) (17 - 18)  SpO2: 100% (05 Jul 2022 05:16) (100% - 100%)  Daily     Daily     GENERAL: no acute distress  NEURO: alert  HEENT: anicteric sclera, no conjunctival pallor appreciated  CHEST: no respiratory distress, no accessory muscle use  CARDIAC: regular rate, rhythm  ABDOMEN: soft, non-tender, non-distended, no rebound or guarding  EXTREMITIES: warm, well perfused, no edema  SKIN: no lesions noted    LABS: reviewed                        11.8   4.80  )-----------( 268      ( 04 Jul 2022 07:30 )             35.6     07-04    140  |  106  |  13  ----------------------------<  76  4.0   |  23  |  1.08    Ca    10.0      04 Jul 2022 07:30  Phos  3.2     07-04  Mg     1.80     07-04          Interval Diagnostic Studies: see sunrise for full report   Chief Complaint:  Patient is a 86y old  Female who presents with a chief complaint of large bowel obstruction with fecalith (04 Jul 2022 08:09)    Interval Events:   Denies BM, no abdominal pain    Hospital Medications:  aluminum hydroxide/magnesium hydroxide/simethicone Suspension 30 milliLiter(s) Oral every 4 hours PRN  lactated ringers. 1000 milliLiter(s) IV Continuous <Continuous>  melatonin 3 milliGRAM(s) Oral at bedtime PRN  ondansetron Injectable 4 milliGRAM(s) IV Push every 8 hours PRN      ROS:   Complete and normal except as mentioned above.    PHYSICAL EXAM:   Vital Signs:  Vital Signs Last 24 Hrs  T(C): 36.4 (05 Jul 2022 05:16), Max: 36.8 (04 Jul 2022 21:45)  T(F): 97.5 (05 Jul 2022 05:16), Max: 98.3 (04 Jul 2022 21:45)  HR: 60 (05 Jul 2022 05:16) (60 - 60)  BP: 152/66 (05 Jul 2022 05:16) (125/90 - 154/60)  BP(mean): --  RR: 17 (05 Jul 2022 05:16) (17 - 18)  SpO2: 100% (05 Jul 2022 05:16) (100% - 100%)  Daily     Daily     GENERAL: no acute distress  NEURO: alert  HEENT: anicteric sclera, no conjunctival pallor appreciated  CHEST: no respiratory distress, no accessory muscle use  CARDIAC: regular rate, rhythm  ABDOMEN: soft, non-tender, less distended, no rebound or guarding  EXTREMITIES: warm, well perfused, no edema  SKIN: no lesions noted    LABS: reviewed                        11.8   4.80  )-----------( 268      ( 04 Jul 2022 07:30 )             35.6     07-04    140  |  106  |  13  ----------------------------<  76  4.0   |  23  |  1.08    Ca    10.0      04 Jul 2022 07:30  Phos  3.2     07-04  Mg     1.80     07-04          Interval Diagnostic Studies: see sunrise for full report

## 2022-07-05 NOTE — PROGRESS NOTE ADULT - SUBJECTIVE AND OBJECTIVE BOX
HPI: 83F Creole speaking with hx of HTN, cholecystectomy and colon ca s/p resection 20 years ago presents with 2 weeks of constipation. Reports that she has had pain for the past 2 weeks, passing gas but very little stool. Reports she went to PCP yesterday who prescribed her laxatives. However did not pass more stool with the laxative. No nausea or vomiting. Patient normally has 2-3 BM a day that are not diarrhea. Patient has yearly colonoscopies and per patient the one last year was normal. Denies fevers/chills/CP/SOB.     Subjective  Patient was seen and examined at bedside. Pt denies any CP, SOB, n/v/d, or chills. Passing flatus, and had 2 BM in the ED 2 d ago. Tolerating a CLD.    Vital Signs Last 24 Hrs  T(C): 36.4 (05 Jul 2022 05:16), Max: 36.8 (04 Jul 2022 21:45)  T(F): 97.5 (05 Jul 2022 05:16), Max: 98.3 (04 Jul 2022 21:45)  HR: 60 (05 Jul 2022 05:16) (60 - 60)  BP: 152/66 (05 Jul 2022 05:16) (125/90 - 154/60)  BP(mean): --  RR: 17 (05 Jul 2022 05:16) (17 - 18)  SpO2: 100% (05 Jul 2022 05:16) (100% - 100%)    Physical  General; NAD  Respiratory: nonlabored breathing  Abdomen: soft, nondistended, nontender. No rebound or guarding  Extremities: WWP       LABS:                          11.8   4.80  )-----------( 268      ( 04 Jul 2022 07:30 )             35.6     07-04    140  |  106  |  13  ----------------------------<  76  4.0   |  23  |  1.08    Ca    10.0      04 Jul 2022 07:30  Phos  3.2     07-04  Mg     1.80     07-04        IMAGING TESTS    CT Abdomen and Pelvis w/ Oral Cont and w/ IV Cont (07.03.22 @ 01:43) >  FINDINGS:  Lungs: Minor dependent hypoventilatory changes in both lower lobes. Lung   bases  are otherwise clear. No pleural effusions.  Heart: Mild cardiomegaly with mitral annular calcification.    Liver: Unremarkable.  Gallbladder and bile ducts: The gallbladder is surgically absent. There   is no  intrahepatic biliary ductal dilatation. The common bile duct again   measures up  to 1 cm in diameter, within normal limits for age and post cholecystectomy  state.  Pancreas: Unremarkable.  Spleen: Unremarkable. The spleen is normal in size.  Adrenal glands: Unremarkable.  Kidneys and ureters: Normal and symmetric renal enhancement. No   hydronephrosis  or hydroureter. Simple-appearing cortical cysts in both kidneys, the   largest  measuring up to 2.4 cm in the posterior left upper pole.  Stomach and bowel: Oral contrast has been administered which has reached   the  mid small bowel at the time of imaging. The stomach is not overtly   dilated.  There are air-fluid in air-contrast levels throughout nondilated small   bowel, a  pattern most suggestive of ileus. There are postsurgical changes from an  apparent previous partial colectomy with an ileocolic anastomosis in the   right  mid abdomen. There is liquid stool with air-fluid levels throughout the   colon,  possibly due to laxative effects. The colon is distended with the right   colon  measuring up to 8.9 cm in diameter. There is an abrupt transition point in  bowel caliber in the proximal sigmoid colon where there is a 3 cm x 2.8   cm x  3.6 cm fecalith or &quot;stool ball&quot; (see series 2, images 88-94).  Appendix: The appendix is not identified and is presumed surgically   absent.    Intraperitoneal space: Trace ascites. No fluid collection. No   pneumoperitoneum.  Retroperitoneal space: Unremarkable. No retroperitoneal collection or   mass.  Vasculature: Moderate atherosclerotic vascular calcifications. Normal   caliber  abdominal aorta.  Lymph nodes: No pathologically enlarged lymph nodes.  Urinary bladder: Underdistended, which limits evaluation. No gross   abnormality.  Reproductive: Unremarkable as visualized.  Bones/joints: Degenerative changes. No suspicious osseous lesions.  Soft tissues: Small fat- and fluid-containing left inguinal hernia. Small  fat-containing right inguinal hernia. Midline postoperative scarring in   the  infraumbilical ventral abdominal wall.    IMPRESSION:  1. Large bowel obstruction secondary to a 3 cm x 2.8 cm x 3.6 cm fecalith   in  the proximal sigmoid colon.  2. Trace ascites, nonspecific.  3. Additional stable chronic and incidental findings are discussed in the   body  of the report.

## 2022-07-05 NOTE — PROGRESS NOTE ADULT - PROBLEM SELECTOR PLAN 2
home amlodipine-benazepril, atenolol  - hold BP meds, currently NPO home amlodipine-benazepril, atenolol  - restarted amlodipine 10 mg., lisinopril currently held  - hold atenolol

## 2022-07-05 NOTE — PROGRESS NOTE ADULT - SUBJECTIVE AND OBJECTIVE BOX
Progress Note    07-03-22 (2d)    Patient is a 86y old  Female who presents with a chief complaint of large bowel obstruction with fecalith (05 Jul 2022 06:09)      Subjective / Overnight Events :  - No acute events overnight.  - Pt seen and examined at bedside.     Additional ROS (if any):    MEDICATIONS  (STANDING):  lactated ringers. 1000 milliLiter(s) (75 mL/Hr) IV Continuous <Continuous>    MEDICATIONS  (PRN):  aluminum hydroxide/magnesium hydroxide/simethicone Suspension 30 milliLiter(s) Oral every 4 hours PRN Dyspepsia  melatonin 3 milliGRAM(s) Oral at bedtime PRN Insomnia  ondansetron Injectable 4 milliGRAM(s) IV Push every 8 hours PRN Nausea and/or Vomiting      CAPILLARY BLOOD GLUCOSE        I&O's Summary      PHYSICAL EXAM:  Vital Signs Last 24 Hrs  T(C): 36.4 (05 Jul 2022 05:16), Max: 36.8 (04 Jul 2022 21:45)  T(F): 97.5 (05 Jul 2022 05:16), Max: 98.3 (04 Jul 2022 21:45)  HR: 60 (05 Jul 2022 05:16) (60 - 60)  BP: 152/66 (05 Jul 2022 05:16) (125/90 - 154/60)  BP(mean): --  RR: 17 (05 Jul 2022 05:16) (17 - 18)  SpO2: 100% (05 Jul 2022 05:16) (100% - 100%)    General: NAD, non-toxic appearing   HEENT: PERRLA, EOMi, no scleral icterus  CV: RRR, normal S1 and S2, no m/r/g  Lungs: normal respiratory effort. CTAB, no wheezes, rales, or rhonchi  Abd: soft, nontender, nondistended  Ext: no edema, 2+ peripheral pulses   Pysch: AAOx3, appropriate affect   Neuro: grossly non-focal, moving all extremities spontaneously   Skin: no rashes or lesions     LABS:                          11.8   4.80  )-----------( 268      ( 04 Jul 2022 07:30 )             35.6       WBC Trend: 4.80<--, 7.44<--  Hb Trend: 11.8<--, 12.1<--    07-04    140  |  106  |  13  ----------------------------<  76  4.0   |  23  |  1.08    Ca    10.0      04 Jul 2022 07:30  Phos  3.2     07-04  Mg     1.80     07-04                Culture - Urine (collected 02 Jul 2022 21:05)  Source: Clean Catch Clean Catch (Midstream)  Final Report (03 Jul 2022 22:03):    <10,000 CFU/mL Normal Urogenital Tanika          RADIOLOGY & ADDITIONAL TESTS: Reviewed Progress Note    22 (2d)    Patient is a 86y old  Female who presents with a chief complaint of large bowel obstruction with fecalith (2022 06:09)      Subjective / Overnight Events :  - No acute events overnight.  Patient given enema and passed 2 large bowel movements.    - Pt seen and examined at bedside.  Has no stomach pain, passing gas.  Feels fine.  Denies any pain or trouble urinating.    Additional ROS (if any):    MEDICATIONS  (STANDING):  lactated ringers. 1000 milliLiter(s) (75 mL/Hr) IV Continuous <Continuous>    MEDICATIONS  (PRN):  aluminum hydroxide/magnesium hydroxide/simethicone Suspension 30 milliLiter(s) Oral every 4 hours PRN Dyspepsia  melatonin 3 milliGRAM(s) Oral at bedtime PRN Insomnia  ondansetron Injectable 4 milliGRAM(s) IV Push every 8 hours PRN Nausea and/or Vomiting      CAPILLARY BLOOD GLUCOSE        I&O's Summary      PHYSICAL EXAM:  Vital Signs Last 24 Hrs  T(C): 36.4 (2022 05:16), Max: 36.8 (2022 21:45)  T(F): 97.5 (2022 05:16), Max: 98.3 (2022 21:45)  HR: 60 (2022 05:16) (60 - 60)  BP: 152/66 (2022 05:16) (125/90 - 154/60)  BP(mean): --  RR: 17 (2022 05:16) (17 - 18)  SpO2: 100% (2022 05:16) (100% - 100%)    General: NAD, non-toxic appearing, lying in bed comfortably  HEENT: normocephalic, atraumatic, MMM, anicteric sclera, PERRL, no conjunctival or nasal discharge  CV: RRR, no m/r/g  Pulm: CTAB on anterior exam, no w/r/r  GI: soft, globose, non-distended, no mass appreciated, mild epigastric pain  Extremities: 2+ radial pulses, no edema in LE  Skin: skin tags observed in L groin  Neuro: no focal deficits, moving all 4 extremities  Psych: AOx3, appropriate affect     LABS:                          11.8   4.80  )-----------( 268      ( 2022 07:30 )             35.6       WBC Trend: 4.80<--, 7.44<--  Hb Trend: 11.8<--, 12.1<--    07-04    140  |  106  |  13  ----------------------------<  76  4.0   |  23  |  1.08    Ca    10.0      2022 07:30  Phos  3.2     07-04  Mg     1.80     07-04    Urinalysis Basic - ( 2022 21:08 )    Color: Yellow / Appearance: Clear / S.020 / pH: x  Gluc: x / Ketone: Negative  / Bili: Negative / Urobili: <2 mg/dL   Blood: x / Protein: 30 mg/dL / Nitrite: Negative   Leuk Esterase: Large / RBC: 5 /HPF / WBC Many /HPF   Sq Epi: x / Non Sq Epi: 1 /HPF / Bacteria: Negative    Culture - Urine (collected 2022 21:05)  Source: Clean Catch Clean Catch (Midstream)  Final Report (2022 22:03):    <10,000 CFU/mL Normal Urogenital Tanika      COVID negative      RADIOLOGY & ADDITIONAL TESTS: Reviewed    2022 CTAP  IMPRESSION:    Nonobstructed bowel. Nonspecific colitis from splenic flexure through   sigmoid colon most severe at the sigmoid colon where there could be an   inflammatory stricture. Possible pneumatosis at splenic flexure raises   concern for ischemic colitis    --- End of Report ---    STUART TALAVERA MD; Attending Radiologist  This document has been electronically signed. Jul  3 2022  4:27PM

## 2022-07-05 NOTE — PROGRESS NOTE ADULT - PROBLEM SELECTOR PLAN 1
CTAP: LBO with fecalith in proximal sigmoid  - f/u NS enema  - NPO  - GI, surgery following, appreciate recs CTAP: Possible pneumatosis with ischemic colitis--> benign abdominal exam and lactate 1.0, AOx3  - advancing diet as tolerated  - give enemas and miralax bid  - CTAP after BM  - MRCP  - GI, surgery following, appreciate recs

## 2022-07-05 NOTE — PROGRESS NOTE ADULT - ASSESSMENT
JORGE EDWARDS is a 86y F, Creole speaking, with h/o colon cancer s/p partial colectomy with ileocolic anastomosis (~20 yrs PTA, no chemoRT), SBO 2017 s/p medical management, HTN, prior CCY, and GERD who initially presented to the ED with complaints of worsening constipation x 2 weeks, abdominal pain and bloating, noted to have a LBO on CT for which GI was consulted.      # ?LBO  - prior CT reported fecalith LBO, however, today the CT read said no findings of LBO and reporting L sided colon wall thickening with narrowing concerning for stricture and possible pneumatosis vs stercoral colitis  # h/o colon cancer s/p resection  # biliary ductal dilatation with liver chemistries wnl - post CCY changes?  - CTAP: Nondistended small bowel loops. Right hemicolectomy. Mild distention of hepatic flexure measuring up to 6.6 cm. Remainder of the colon is not distended. Air-fluid level within the colon suggest diarrhea illness. Wall thickening of the splenic flexure, descending and sigmoid colon greatest involving the mid sigmoid colon where there is luminal narrowing and surrounding inflammation. Possible mild pneumatosis coli involving splenic flexure versus air trapped between colon wall and stool. And  central intrahepatic and extrahepatic biliary dilation. Common bile duct maximally measures 1.1 cm however it appears to taper distally. Possible hypodensity suggested within the distal CBD suggestive of choledocholithiasis.    Recommendations:  - Unclear whether patient has a stricture and may benefit from colonoscopy for further evaluation at some point if there is not pneumatosis coli  - Give multiple enemas to allow for BMs, and then repeat imaging r/o pneumatosis coli  - If negative for pneumatosis coli, will consider colonoscopy, otherwise would obtain surgical input  - Surgery following  - Closely monitor for worsening sx or decline in condition  - Serial abdominal exams with Xray  - Daily CBC, BMP, coags  - Consider MRCP  - Ensure adequate hydration  - Antiemetics as needed  - Pain control, avoid NSAIDs and opioids  - Rest of care per primary    Preliminary note until signed by Attending.    Thank you for involving us in this patient's care.    Latoya Harrison MD  Gastroenterology/Hepatology Fellow, PGY-VI    NON-URGENT CONSULTS:  Please email [email or web address]  AT NIGHT AND ON WEEKENDS:  Contact on-call GI fellow via answering service ([phone]) from 5pm-8am and on weekends/holidays  MONDAY-FRIDAY 8AM-5PM:  Pager# [phone] ([facility])  GI Phone# [phone] ([facility])   JORGE EDWARDS is a 86y F, Creole speaking, with h/o colon cancer s/p partial colectomy with ileocolic anastomosis (~20 yrs PTA, no chemoRT), SBO 2017 s/p medical management, HTN, prior CCY, and GERD who initially presented to the ED with complaints of worsening constipation x 2 weeks, abdominal pain and bloating, noted to have a LBO on CT for which GI was consulted.      # ?LBO  - prior CT reported fecalith LBO, however, today the CT read said no findings of LBO and reporting L sided colon wall thickening with narrowing concerning for stricture and possible pneumatosis vs stercoral colitis  # h/o colon cancer s/p resection  # biliary ductal dilatation with liver chemistries wnl - post CCY changes?  - CTAP: Nondistended small bowel loops. Right hemicolectomy. Mild distention of hepatic flexure measuring up to 6.6 cm. Remainder of the colon is not distended. Air-fluid level within the colon suggest diarrhea illness. Wall thickening of the splenic flexure, descending and sigmoid colon greatest involving the mid sigmoid colon where there is luminal narrowing and surrounding inflammation. Possible mild pneumatosis coli involving splenic flexure versus air trapped between colon wall and stool. And  central intrahepatic and extrahepatic biliary dilation. Common bile duct maximally measures 1.1 cm however it appears to taper distally. Possible hypodensity suggested within the distal CBD suggestive of choledocholithiasis.    Recommendations:  - Unclear whether patient has a stricture and may benefit from colonoscopy for further evaluation at some point if there is not pneumatosis coli  - Please give multiple enemas to allow for BMs, and then repeat imaging r/o pneumatosis coli  - If negative for pneumatosis coli, will consider colonoscopy, otherwise would obtain surgical input  - Surgery following  - Closely monitor for worsening sx or decline in condition  - Serial abdominal exams with Xray  - Daily CBC, BMP, coags  - Check MRCP  - Ensure adequate hydration  - Antiemetics as needed  - Pain control, avoid NSAIDs and opioids  - Rest of care per primary    Preliminary note until signed by Attending.    Thank you for involving us in this patient's care.    Latoya Harrison MD  Gastroenterology/Hepatology Fellow, PGY-VI    NON-URGENT CONSULTS:  Please email [email or web address]  AT NIGHT AND ON WEEKENDS:  Contact on-call GI fellow via answering service ([phone]) from 5pm-8am and on weekends/holidays  MONDAY-FRIDAY 8AM-5PM:  Pager# [phone] ([facility])  GI Phone# [phone] ([facility])

## 2022-07-06 LAB
ANION GAP SERPL CALC-SCNC: 9 MMOL/L — SIGNIFICANT CHANGE UP (ref 7–14)
APTT BLD: 33.4 SEC — SIGNIFICANT CHANGE UP (ref 27–36.3)
BUN SERPL-MCNC: 9 MG/DL — SIGNIFICANT CHANGE UP (ref 7–23)
CALCIUM SERPL-MCNC: 10.3 MG/DL — SIGNIFICANT CHANGE UP (ref 8.4–10.5)
CHLORIDE SERPL-SCNC: 106 MMOL/L — SIGNIFICANT CHANGE UP (ref 98–107)
CO2 SERPL-SCNC: 26 MMOL/L — SIGNIFICANT CHANGE UP (ref 22–31)
CREAT SERPL-MCNC: 1.03 MG/DL — SIGNIFICANT CHANGE UP (ref 0.5–1.3)
EGFR: 53 ML/MIN/1.73M2 — LOW
GLUCOSE SERPL-MCNC: 80 MG/DL — SIGNIFICANT CHANGE UP (ref 70–99)
HCT VFR BLD CALC: 35.4 % — SIGNIFICANT CHANGE UP (ref 34.5–45)
HGB BLD-MCNC: 11.4 G/DL — LOW (ref 11.5–15.5)
INR BLD: 1.14 RATIO — SIGNIFICANT CHANGE UP (ref 0.88–1.16)
LACTATE SERPL-SCNC: 0.8 MMOL/L — SIGNIFICANT CHANGE UP (ref 0.5–2)
MAGNESIUM SERPL-MCNC: 1.7 MG/DL — SIGNIFICANT CHANGE UP (ref 1.6–2.6)
MCHC RBC-ENTMCNC: 29.5 PG — SIGNIFICANT CHANGE UP (ref 27–34)
MCHC RBC-ENTMCNC: 32.2 GM/DL — SIGNIFICANT CHANGE UP (ref 32–36)
MCV RBC AUTO: 91.7 FL — SIGNIFICANT CHANGE UP (ref 80–100)
NRBC # BLD: 0 /100 WBCS — SIGNIFICANT CHANGE UP
NRBC # FLD: 0 K/UL — SIGNIFICANT CHANGE UP
PHOSPHATE SERPL-MCNC: 2.6 MG/DL — SIGNIFICANT CHANGE UP (ref 2.5–4.5)
PLATELET # BLD AUTO: 294 K/UL — SIGNIFICANT CHANGE UP (ref 150–400)
POTASSIUM SERPL-MCNC: 4.2 MMOL/L — SIGNIFICANT CHANGE UP (ref 3.5–5.3)
POTASSIUM SERPL-SCNC: 4.2 MMOL/L — SIGNIFICANT CHANGE UP (ref 3.5–5.3)
PROTHROM AB SERPL-ACNC: 13.3 SEC — SIGNIFICANT CHANGE UP (ref 10.5–13.4)
RBC # BLD: 3.86 M/UL — SIGNIFICANT CHANGE UP (ref 3.8–5.2)
RBC # FLD: 13.2 % — SIGNIFICANT CHANGE UP (ref 10.3–14.5)
SODIUM SERPL-SCNC: 141 MMOL/L — SIGNIFICANT CHANGE UP (ref 135–145)
WBC # BLD: 3.5 K/UL — LOW (ref 3.8–10.5)
WBC # FLD AUTO: 3.5 K/UL — LOW (ref 3.8–10.5)

## 2022-07-06 PROCEDURE — 99233 SBSQ HOSP IP/OBS HIGH 50: CPT | Mod: GC

## 2022-07-06 PROCEDURE — 99232 SBSQ HOSP IP/OBS MODERATE 35: CPT | Mod: GC

## 2022-07-06 PROCEDURE — 74177 CT ABD & PELVIS W/CONTRAST: CPT | Mod: 26

## 2022-07-06 RX ORDER — LISINOPRIL 2.5 MG/1
20 TABLET ORAL DAILY
Refills: 0 | Status: DISCONTINUED | OUTPATIENT
Start: 2022-07-06 | End: 2022-07-10

## 2022-07-06 RX ORDER — MULTIVIT WITH MIN/MFOLATE/K2 340-15/3 G
1 POWDER (GRAM) ORAL ONCE
Refills: 0 | Status: COMPLETED | OUTPATIENT
Start: 2022-07-06 | End: 2022-07-06

## 2022-07-06 RX ORDER — AMLODIPINE BESYLATE 2.5 MG/1
10 TABLET ORAL DAILY
Refills: 0 | Status: DISCONTINUED | OUTPATIENT
Start: 2022-07-07 | End: 2022-07-10

## 2022-07-06 RX ORDER — SENNA PLUS 8.6 MG/1
2 TABLET ORAL AT BEDTIME
Refills: 0 | Status: DISCONTINUED | OUTPATIENT
Start: 2022-07-06 | End: 2022-07-10

## 2022-07-06 RX ORDER — DIPHENHYDRAMINE HCL 50 MG
50 CAPSULE ORAL ONCE
Refills: 0 | Status: COMPLETED | OUTPATIENT
Start: 2022-07-06 | End: 2022-07-06

## 2022-07-06 RX ADMIN — Medication 1 BOTTLE: at 22:33

## 2022-07-06 RX ADMIN — ENOXAPARIN SODIUM 40 MILLIGRAM(S): 100 INJECTION SUBCUTANEOUS at 12:54

## 2022-07-06 RX ADMIN — POLYETHYLENE GLYCOL 3350 17 GRAM(S): 17 POWDER, FOR SOLUTION ORAL at 06:21

## 2022-07-06 RX ADMIN — Medication 5 MILLIGRAM(S): at 22:33

## 2022-07-06 RX ADMIN — Medication 3 MILLIGRAM(S): at 22:33

## 2022-07-06 RX ADMIN — SENNA PLUS 2 TABLET(S): 8.6 TABLET ORAL at 22:36

## 2022-07-06 RX ADMIN — POLYETHYLENE GLYCOL 3350 17 GRAM(S): 17 POWDER, FOR SOLUTION ORAL at 18:20

## 2022-07-06 RX ADMIN — AMLODIPINE BESYLATE 10 MILLIGRAM(S): 2.5 TABLET ORAL at 06:21

## 2022-07-06 RX ADMIN — PANTOPRAZOLE SODIUM 40 MILLIGRAM(S): 20 TABLET, DELAYED RELEASE ORAL at 06:21

## 2022-07-06 RX ADMIN — LISINOPRIL 20 MILLIGRAM(S): 2.5 TABLET ORAL at 22:33

## 2022-07-06 RX ADMIN — Medication 50 MILLIGRAM(S): at 12:54

## 2022-07-06 NOTE — PROGRESS NOTE ADULT - ASSESSMENT
JORGE EDWARDS is a 86y F, Creole speaking, with h/o colon cancer s/p partial colectomy with ileocolic anastomosis (~20 yrs PTA, no chemoRT), SBO 2017 s/p medical management, HTN, prior CCY, and GERD who initially presented to the ED with complaints of worsening constipation x 2 weeks, abdominal pain and bloating, noted to have a LBO on CT for which GI was consulted. No evidence of LBO on repeat CT on 7/3 but possible pneumatosis coli vs stercoral colitis.     # ?LBO  - prior CT reported fecalith LBO, however, CT AP 7/3 said no findings of LBO and reporting L sided colon wall thickening with narrowing concerning for stricture and possible pneumatosis vs stercoral colitis. Abdominal XR 7/5 without evidence of obstruction.  # h/o colon cancer s/p resection  # biliary ductal dilatation with liver chemistries wnl - post CCY changes?  - CTAP: Nondistended small bowel loops. Right hemicolectomy. Mild distention of hepatic flexure measuring up to 6.6 cm. Remainder of the colon is not distended. Air-fluid level within the colon suggest diarrhea illness. Wall thickening of the splenic flexure, descending and sigmoid colon greatest involving the mid sigmoid colon where there is luminal narrowing and surrounding inflammation. Possible mild pneumatosis coli involving splenic flexure versus air trapped between colon wall and stool. And central intrahepatic and extrahepatic biliary dilation. Common bile duct maximally measures 1.1 cm however it appears to taper distally. Possible hypodensity suggested within the distal CBD suggestive of choledocholithiasis.    Recommendations:  - Unclear whether patient has a stricture and may benefit from colonoscopy for further evaluation at some point if there is not pneumatosis coli  - Please obtain CT A/P w/ IVC to r/o pneumatosis coli  - If negative for pneumatosis coli, will consider colonoscopy, otherwise would obtain surgical input  - Surgery following  - Closely monitor for worsening sx or decline in condition  - Serial abdominal exams with Xray  - Daily CBC, BMP, coags  - Check MRCP given biliary ductal dilation on CT imaging  - Ensure adequate hydration  - Antiemetics as needed  - Pain control, avoid NSAIDs and opioids  - Rest of care per primary    Preliminary note until signed by Attending.    Thank you for involving us in this patient's care.    Patience Solis, PGY5  Gastroenterology/Hepatology Fellow  Available on [employer] Teams  [phone] ([employer] Short Range Pager)  [phone] (Long Range Pager)    After 5pm, please contact the on-call GI fellow. [phone]

## 2022-07-06 NOTE — PROGRESS NOTE ADULT - SUBJECTIVE AND OBJECTIVE BOX
HPI: 83F Creole speaking with hx of HTN, cholecystectomy and colon ca s/p resection 20 years ago presents with 2 weeks of constipation. Reports that she has had pain for the past 2 weeks, passing gas but very little stool. Reports she went to PCP yesterday who prescribed her laxatives. However did not pass more stool with the laxative. No nausea or vomiting. Patient normally has 2-3 BM a day that are not diarrhea. Patient has yearly colonoscopies and per patient the one last year was normal. Denies fevers/chills/CP/SOB.     Subjective  Patient was seen and examined at bedside. Pt denies any CP, SOB, n/v/d, or chills. Passing flatus, and had 2 BM yesterday after miralax/enemas. Tolerating a CLD      Vital Signs Last 24 Hrs  T(C): 36.6 (06 Jul 2022 06:20), Max: 36.8 (05 Jul 2022 21:25)  T(F): 97.8 (06 Jul 2022 06:20), Max: 98.2 (05 Jul 2022 21:25)  HR: 55 (06 Jul 2022 06:20) (48 - 55)  BP: 155/64 (06 Jul 2022 06:20) (155/64 - 166/72)  BP(mean): --  RR: 17 (06 Jul 2022 06:20) (17 - 17)  SpO2: 98% (06 Jul 2022 06:20) (98% - 100%)    Physical  General; NAD  Respiratory: nonlabored breathing  Abdomen: soft, nondistended, nontender. No rebound or guarding  Extremities: WWP        LABS:  cret                        11.4   3.50  )-----------( 294      ( 06 Jul 2022 06:50 )             35.4     07-06    141  |  106  |  9   ----------------------------<  80  4.2   |  26  |  1.03    Ca    10.3      06 Jul 2022 06:50  Phos  2.6     07-06  Mg     1.70     07-06      PT/INR - ( 06 Jul 2022 06:50 )   PT: 13.3 sec;   INR: 1.14 ratio         PTT - ( 06 Jul 2022 06:50 )  PTT:33.4 sec                IMAGING TESTS    CT Abdomen and Pelvis w/ Oral Cont and w/ IV Cont (07.03.22 @ 01:43) >  FINDINGS:  Lungs: Minor dependent hypoventilatory changes in both lower lobes. Lung   bases  are otherwise clear. No pleural effusions.  Heart: Mild cardiomegaly with mitral annular calcification.    Liver: Unremarkable.  Gallbladder and bile ducts: The gallbladder is surgically absent. There   is no  intrahepatic biliary ductal dilatation. The common bile duct again   measures up  to 1 cm in diameter, within normal limits for age and post cholecystectomy  state.  Pancreas: Unremarkable.  Spleen: Unremarkable. The spleen is normal in size.  Adrenal glands: Unremarkable.  Kidneys and ureters: Normal and symmetric renal enhancement. No   hydronephrosis  or hydroureter. Simple-appearing cortical cysts in both kidneys, the   largest  measuring up to 2.4 cm in the posterior left upper pole.  Stomach and bowel: Oral contrast has been administered which has reached   the  mid small bowel at the time of imaging. The stomach is not overtly   dilated.  There are air-fluid in air-contrast levels throughout nondilated small   bowel, a  pattern most suggestive of ileus. There are postsurgical changes from an  apparent previous partial colectomy with an ileocolic anastomosis in the   right  mid abdomen. There is liquid stool with air-fluid levels throughout the   colon,  possibly due to laxative effects. The colon is distended with the right   colon  measuring up to 8.9 cm in diameter. There is an abrupt transition point in  bowel caliber in the proximal sigmoid colon where there is a 3 cm x 2.8   cm x  3.6 cm fecalith or &quot;stool ball&quot; (see series 2, images 88-94).  Appendix: The appendix is not identified and is presumed surgically   absent.    Intraperitoneal space: Trace ascites. No fluid collection. No   pneumoperitoneum.  Retroperitoneal space: Unremarkable. No retroperitoneal collection or   mass.  Vasculature: Moderate atherosclerotic vascular calcifications. Normal   caliber  abdominal aorta.  Lymph nodes: No pathologically enlarged lymph nodes.  Urinary bladder: Underdistended, which limits evaluation. No gross   abnormality.  Reproductive: Unremarkable as visualized.  Bones/joints: Degenerative changes. No suspicious osseous lesions.  Soft tissues: Small fat- and fluid-containing left inguinal hernia. Small  fat-containing right inguinal hernia. Midline postoperative scarring in   the  infraumbilical ventral abdominal wall.    IMPRESSION:  1. Large bowel obstruction secondary to a 3 cm x 2.8 cm x 3.6 cm fecalith   in  the proximal sigmoid colon.  2. Trace ascites, nonspecific.  3. Additional stable chronic and incidental findings are discussed in the   body  of the report.

## 2022-07-06 NOTE — PROGRESS NOTE ADULT - ASSESSMENT
Patient is a 85 y/o Creole-speaking F with hx of colon ca s/p resection 20 yrs ago, s/p cholecystectomy, HTN, and GERD presenting with constipation 2/2 LBO.  7/3 CTAP with possible pneumatosis with ischemic colitis.  Patient with benign abdominal exam, normal lactate, afebrile, at baseline mental status and not meeting sepsis criteria.  Due to benign clinical presentation and lab work, reassessing imaging. Patient is a 87 y/o Creole-speaking F with hx of colon ca s/p resection 20 yrs ago, s/p cholecystectomy, HTN, and GERD presenting with constipation 2/2 LBO.  7/3 CTAP with possible pneumatosis with ischemic colitis.  Patient with benign abdominal exam, normal lactate, afebrile, at baseline mental status and not meeting sepsis criteria.  Due to benign clinical presentation and lab work, performed CTAP after BM, redemonstration of colitis, with potential benign or malignant stricture.

## 2022-07-06 NOTE — PROGRESS NOTE ADULT - PROBLEM SELECTOR PLAN 2
home amlodipine-benazepril, atenolol  - restarted amlodipine 10 mg., lisinopril currently held  - hold atenolol home amlodipine-benazepril, atenolol  - restarted amlodipine 10 mg., lisinopril restarted at 20mg  - hold atenolol due to low HR

## 2022-07-06 NOTE — PROGRESS NOTE ADULT - SUBJECTIVE AND OBJECTIVE BOX
Gastroenterology Progress Note    Interval Events: Patient with episode of R-sided abdominal pain overnight. Endorses passing stool after enema yesterday, no more episodes today. Tolerating CLD, denies nausea or vomiting,    Allergies:  No Known Allergies      Hospital Medications:  aluminum hydroxide/magnesium hydroxide/simethicone Suspension 30 milliLiter(s) Oral every 4 hours PRN  amLODIPine   Tablet 10 milliGRAM(s) Oral daily  enoxaparin Injectable 40 milliGRAM(s) SubCutaneous every 24 hours  melatonin 3 milliGRAM(s) Oral at bedtime PRN  ondansetron Injectable 4 milliGRAM(s) IV Push every 8 hours PRN  pantoprazole    Tablet 40 milliGRAM(s) Oral before breakfast  polyethylene glycol 3350 17 Gram(s) Oral two times a day    ROS: 14 point ROS negative unless otherwise state in subjective    PHYSICAL EXAM:   Vital Signs:  Vital Signs Last 24 Hrs  T(C): 36.6 (06 Jul 2022 06:20), Max: 36.8 (05 Jul 2022 21:25)  T(F): 97.8 (06 Jul 2022 06:20), Max: 98.2 (05 Jul 2022 21:25)  HR: 55 (06 Jul 2022 06:20) (48 - 55)  BP: 155/64 (06 Jul 2022 06:20) (155/64 - 166/72)  BP(mean): --  RR: 17 (06 Jul 2022 06:20) (17 - 17)  SpO2: 98% (06 Jul 2022 06:20) (98% - 100%)  Daily     Daily     GENERAL:  No acute distress  HEENT:  NCAT, no scleral icterus  CHEST: no resp distress  HEART:  RRR  ABDOMEN:  Soft, non-tender, non-distended, normoactive bowel sounds, no masses  EXTREMITIES:  No cyanosis, clubbing, or edema  SKIN:  No rash/erythema/ecchymoses/petechiae/wounds/abscess/warm/dry  NEURO:  Alert and oriented x 3, no asterixis, no tremor    LABS:                        11.4   3.50  )-----------( 294      ( 06 Jul 2022 06:50 )             35.4     Mean Cell Volume: 91.7 fL (07-06-22 @ 06:50)    07-06    141  |  106  |  9   ----------------------------<  80  4.2   |  26  |  1.03    Ca    10.3      06 Jul 2022 06:50  Phos  2.6     07-06  Mg     1.70     07-06        PT/INR - ( 06 Jul 2022 06:50 )   PT: 13.3 sec;   INR: 1.14 ratio         PTT - ( 06 Jul 2022 06:50 )  PTT:33.4 sec          Imaging:

## 2022-07-06 NOTE — PROGRESS NOTE ADULT - SUBJECTIVE AND OBJECTIVE BOX
Progress Note    07-03-22 (3d)    Patient is a 86y old  Female who presents with a chief complaint of large bowel obstruction with fecalith (05 Jul 2022 09:45)      Subjective / Overnight Events :  - No acute events overnight.  - Pt seen and examined at bedside.     Additional ROS (if any):    MEDICATIONS  (STANDING):  amLODIPine   Tablet 10 milliGRAM(s) Oral daily  enoxaparin Injectable 40 milliGRAM(s) SubCutaneous every 24 hours  pantoprazole    Tablet 40 milliGRAM(s) Oral before breakfast  polyethylene glycol 3350 17 Gram(s) Oral two times a day    MEDICATIONS  (PRN):  aluminum hydroxide/magnesium hydroxide/simethicone Suspension 30 milliLiter(s) Oral every 4 hours PRN Dyspepsia  melatonin 3 milliGRAM(s) Oral at bedtime PRN Insomnia  ondansetron Injectable 4 milliGRAM(s) IV Push every 8 hours PRN Nausea and/or Vomiting      CAPILLARY BLOOD GLUCOSE        I&O's Summary      PHYSICAL EXAM:  Vital Signs Last 24 Hrs  T(C): 36.6 (06 Jul 2022 06:20), Max: 36.8 (05 Jul 2022 21:25)  T(F): 97.8 (06 Jul 2022 06:20), Max: 98.2 (05 Jul 2022 21:25)  HR: 55 (06 Jul 2022 06:20) (48 - 55)  BP: 155/64 (06 Jul 2022 06:20) (155/64 - 166/72)  BP(mean): --  RR: 17 (06 Jul 2022 06:20) (17 - 17)  SpO2: 98% (06 Jul 2022 06:20) (98% - 100%)    General: NAD, non-toxic appearing   HEENT: PERRLA, EOMi, no scleral icterus  CV: RRR, normal S1 and S2, no m/r/g  Lungs: normal respiratory effort. CTAB, no wheezes, rales, or rhonchi  Abd: soft, nontender, nondistended  Ext: no edema, 2+ peripheral pulses   Pysch: AAOx3, appropriate affect   Neuro: grossly non-focal, moving all extremities spontaneously   Skin: no rashes or lesions     LABS:                          10.9   4.12  )-----------( 271      ( 05 Jul 2022 12:42 )             32.7       WBC Trend: 4.12<--, 4.80<--, 7.44<--  Hb Trend: 10.9<--, 11.8<--, 12.1<--    07-05    141  |  106  |  12  ----------------------------<  87  4.0   |  25  |  1.06    Ca    10.3      05 Jul 2022 12:42  Phos  2.4     07-05  Mg     1.80     07-05      PT/INR - ( 05 Jul 2022 12:42 )   PT: 13.2 sec;   INR: 1.14 ratio         PTT - ( 05 Jul 2022 12:42 )  PTT:30.5 sec              RADIOLOGY & ADDITIONAL TESTS: Reviewed Progress Note    07-03-22 (3d)    Patient is a 86y old  Female who presents with a chief complaint of large bowel obstruction with fecalith (05 Jul 2022 09:45)      Subjective / Overnight Events :  - No acute events overnight.  - Pt seen and examined at bedside.     Additional ROS (if any):    MEDICATIONS  (STANDING):  amLODIPine   Tablet 10 milliGRAM(s) Oral daily  enoxaparin Injectable 40 milliGRAM(s) SubCutaneous every 24 hours  pantoprazole    Tablet 40 milliGRAM(s) Oral before breakfast  polyethylene glycol 3350 17 Gram(s) Oral two times a day    MEDICATIONS  (PRN):  aluminum hydroxide/magnesium hydroxide/simethicone Suspension 30 milliLiter(s) Oral every 4 hours PRN Dyspepsia  melatonin 3 milliGRAM(s) Oral at bedtime PRN Insomnia  ondansetron Injectable 4 milliGRAM(s) IV Push every 8 hours PRN Nausea and/or Vomiting      CAPILLARY BLOOD GLUCOSE        I&O's Summary      PHYSICAL EXAM:  Vital Signs Last 24 Hrs  T(C): 36.6 (06 Jul 2022 06:20), Max: 36.8 (05 Jul 2022 21:25)  T(F): 97.8 (06 Jul 2022 06:20), Max: 98.2 (05 Jul 2022 21:25)  HR: 55 (06 Jul 2022 06:20) (48 - 55)  BP: 155/64 (06 Jul 2022 06:20) (155/64 - 166/72)  BP(mean): --  RR: 17 (06 Jul 2022 06:20) (17 - 17)  SpO2: 98% (06 Jul 2022 06:20) (98% - 100%)    General: NAD, non-toxic appearing   HEENT: PERRLA, EOMi, no scleral icterus  CV: RRR, normal S1 and S2, no m/r/g  Lungs: normal respiratory effort. CTAB, no wheezes, rales, or rhonchi  Abd: soft, nontender, nondistended  Ext: no edema, 2+ peripheral pulses   Pysch: AAOx3, appropriate affect   Neuro: grossly non-focal, moving all extremities spontaneously   Skin: no rashes or lesions     LABS:                          10.9   4.12  )-----------( 271      ( 05 Jul 2022 12:42 )             32.7       WBC Trend: 4.12<--, 4.80<--, 7.44<--  Hb Trend: 10.9<--, 11.8<--, 12.1<--    07-05    141  |  106  |  12  ----------------------------<  87  4.0   |  25  |  1.06    Ca    10.3      05 Jul 2022 12:42  Phos  2.4     07-05  Mg     1.80     07-05      PT/INR - ( 05 Jul 2022 12:42 )   PT: 13.2 sec;   INR: 1.14 ratio         PTT - ( 05 Jul 2022 12:42 )  PTT:30.5 sec              RADIOLOGY & ADDITIONAL TESTS: Reviewed    7/6/2022 CTAP  IMPRESSION:  Redemonstration of colitis involving descending and sigmoid colon. No   colonic pneumatosis. Persistent luminal narrowing in the mid sigmoid   colon. Underlying benign or malignant stricture cannot be excluded.    --- End of Report ---    MONSE CASSIDY MD; Attending Radiologist  This document has been electronically signed. Jul 6 2022 12:53PM

## 2022-07-06 NOTE — PROGRESS NOTE ADULT - ASSESSMENT
83F Creole speaking with hx of HTN, cholecystectomy and colon ca s/p resection 20 years ago presents with LBO. Patient not completely obstructed, currently passing flatus, last BM 2d ago.    Plan:   - Patient tolerating CLD; Advance diet as tolerated  - Recommend broad spectrum antibiotics  - GI recommendations appreciated; patient may benefit from colonoscopy for further evaluation if there is not pneumatosis coli  - F/U read of repeat CTAP to r/o pneumatosis coli  - Continue with Miralax BID and enemas to maintain BM   - Closely monitor for worsening sx or decline in condition  - Surgery will follow closely    B team Surgery  [id number]

## 2022-07-06 NOTE — PROGRESS NOTE ADULT - PROBLEM SELECTOR PLAN 1
CTAP: Possible pneumatosis with ischemic colitis--> benign abdominal exam and lactate 1.0, AOx3  - advancing diet as tolerated  - give enemas and miralax bid  - CTAP after BM  - MRCP  - GI, surgery following, appreciate recs CTAP: Possible pneumatosis with ischemic colitis--> benign abdominal exam and lactate 1.0, AOx3  - advancing diet as tolerated  - give enemas and miralax bid  - CTAP after BM  - MRCP  - GI, surgery following, appreciate recs  - GI may perform inpatient colonoscopy, may require 2 days of bowel prep, pending final GI recs. CTAP: Possible pneumatosis with ischemic colitis--> benign abdominal exam and lactate 1.0, AOx3  - advancing diet as tolerated  - give enemas and miralax bid  - CTAP after BM  - MRCP on schedule  - GI, surgery following, appreciate recs  - GI may perform inpatient colonoscopy, may require 2 days of bowel prep, pending final GI recs.

## 2022-07-07 LAB — SARS-COV-2 RNA SPEC QL NAA+PROBE: SIGNIFICANT CHANGE UP

## 2022-07-07 PROCEDURE — 99232 SBSQ HOSP IP/OBS MODERATE 35: CPT | Mod: GC

## 2022-07-07 PROCEDURE — 99233 SBSQ HOSP IP/OBS HIGH 50: CPT | Mod: GC

## 2022-07-07 RX ADMIN — POLYETHYLENE GLYCOL 3350 17 GRAM(S): 17 POWDER, FOR SOLUTION ORAL at 06:07

## 2022-07-07 RX ADMIN — ENOXAPARIN SODIUM 40 MILLIGRAM(S): 100 INJECTION SUBCUTANEOUS at 13:16

## 2022-07-07 RX ADMIN — AMLODIPINE BESYLATE 10 MILLIGRAM(S): 2.5 TABLET ORAL at 06:07

## 2022-07-07 RX ADMIN — SENNA PLUS 2 TABLET(S): 8.6 TABLET ORAL at 22:33

## 2022-07-07 RX ADMIN — LISINOPRIL 20 MILLIGRAM(S): 2.5 TABLET ORAL at 06:07

## 2022-07-07 RX ADMIN — POLYETHYLENE GLYCOL 3350 17 GRAM(S): 17 POWDER, FOR SOLUTION ORAL at 17:07

## 2022-07-07 RX ADMIN — PANTOPRAZOLE SODIUM 40 MILLIGRAM(S): 20 TABLET, DELAYED RELEASE ORAL at 06:07

## 2022-07-07 RX ADMIN — Medication 5 MILLIGRAM(S): at 22:33

## 2022-07-07 NOTE — PROGRESS NOTE ADULT - SUBJECTIVE AND OBJECTIVE BOX
HPI: 83F Creole speaking with hx of HTN, cholecystectomy and colon ca s/p resection 20 years ago presents with 2 weeks of constipation. Reports that she has had pain for the past 2 weeks, passing gas but very little stool. Reports she went to PCP yesterday who prescribed her laxatives. However did not pass more stool with the laxative. No nausea or vomiting. Patient normally has 2-3 BM a day that are not diarrhea. Patient has yearly colonoscopies and per patient the one last year was normal. Denies fevers/chills/CP/SOB.     Subjective  Patient was seen and examined at bedside. Pt denies any CP, SOB, n/v/d, or chills. Passing flatus and bowel movements over the past few days with miralax/enemas. Tolerating a CLD      Vital Signs Last 24 Hrs  T(C): 36.4 (07 Jul 2022 05:38), Max: 36.6 (06 Jul 2022 22:00)  T(F): 97.5 (07 Jul 2022 05:38), Max: 97.8 (06 Jul 2022 22:00)  HR: 59 (07 Jul 2022 05:38) (55 - 59)  BP: 129/65 (07 Jul 2022 05:38) (127/87 - 175/63)  BP(mean): --  RR: 17 (07 Jul 2022 05:38) (17 - 18)  SpO2: 100% (07 Jul 2022 05:38) (100% - 100%)    Physical  General; NAD  Respiratory: nonlabored breathing  Abdomen: soft, nondistended, nontender. No rebound or guarding  Extremities: WWP        LABS:                           11.4   3.50  )-----------( 294      ( 06 Jul 2022 06:50 )             35.4     07-06    141  |  106  |  9   ----------------------------<  80  4.2   |  26  |  1.03    Ca    10.3      06 Jul 2022 06:50  Phos  2.6     07-06  Mg     1.70     07-06      PT/INR - ( 06 Jul 2022 06:50 )   PT: 13.3 sec;   INR: 1.14 ratio         PTT - ( 06 Jul 2022 06:50 )  PTT:33.4 sec          IMAGING TESTS    CT Abdomen and Pelvis w/ Oral Cont and w/ IV Cont (07.03.22 @ 01:43) >  FINDINGS:  Lungs: Minor dependent hypoventilatory changes in both lower lobes. Lung   bases  are otherwise clear. No pleural effusions.  Heart: Mild cardiomegaly with mitral annular calcification.    Liver: Unremarkable.  Gallbladder and bile ducts: The gallbladder is surgically absent. There   is no  intrahepatic biliary ductal dilatation. The common bile duct again   measures up  to 1 cm in diameter, within normal limits for age and post cholecystectomy  state.  Pancreas: Unremarkable.  Spleen: Unremarkable. The spleen is normal in size.  Adrenal glands: Unremarkable.  Kidneys and ureters: Normal and symmetric renal enhancement. No   hydronephrosis  or hydroureter. Simple-appearing cortical cysts in both kidneys, the   largest  measuring up to 2.4 cm in the posterior left upper pole.  Stomach and bowel: Oral contrast has been administered which has reached   the  mid small bowel at the time of imaging. The stomach is not overtly   dilated.  There are air-fluid in air-contrast levels throughout nondilated small   bowel, a  pattern most suggestive of ileus. There are postsurgical changes from an  apparent previous partial colectomy with an ileocolic anastomosis in the   right  mid abdomen. There is liquid stool with air-fluid levels throughout the   colon,  possibly due to laxative effects. The colon is distended with the right   colon  measuring up to 8.9 cm in diameter. There is an abrupt transition point in  bowel caliber in the proximal sigmoid colon where there is a 3 cm x 2.8   cm x  3.6 cm fecalith or &quot;stool ball&quot; (see series 2, images 88-94).  Appendix: The appendix is not identified and is presumed surgically   absent.    Intraperitoneal space: Trace ascites. No fluid collection. No   pneumoperitoneum.  Retroperitoneal space: Unremarkable. No retroperitoneal collection or   mass.  Vasculature: Moderate atherosclerotic vascular calcifications. Normal   caliber  abdominal aorta.  Lymph nodes: No pathologically enlarged lymph nodes.  Urinary bladder: Underdistended, which limits evaluation. No gross   abnormality.  Reproductive: Unremarkable as visualized.  Bones/joints: Degenerative changes. No suspicious osseous lesions.  Soft tissues: Small fat- and fluid-containing left inguinal hernia. Small  fat-containing right inguinal hernia. Midline postoperative scarring in   the  infraumbilical ventral abdominal wall.    IMPRESSION:  1. Large bowel obstruction secondary to a 3 cm x 2.8 cm x 3.6 cm fecalith   in  the proximal sigmoid colon.  2. Trace ascites, nonspecific.  3. Additional stable chronic and incidental findings are discussed in the   body  of the report.    ACC: [id number] EXAM: CT ABDOMEN AND PELVIS IC    PROCEDURE DATE: 07/06/2022        INTERPRETATION: CLINICAL INFORMATION: Constipation. Prior possible pneumatosis. Ischemic colitis.    COMPARISON: CT abdomen pelvis 7/3/2022.    CONTRAST/COMPLICATIONS:  IV Contrast: Omnipaque 350 60 cc administered 4 cc discarded  Oral Contrast: NONE  Complications: None reported at time of study completion    PROCEDURE:  CT of the Abdomen and Pelvis was performed.  Sagittal and coronal reformats were performed.    FINDINGS:  LOWER CHEST: Within normal limits.    LIVER: Within normal limits.  BILE DUCTS: Stable extrahepatic biliary ductal dilatation with distal tapering, which may be related to the cholecystectomy state.  GALLBLADDER: Cholecystectomy.  SPLEEN: Within normal limits.  PANCREAS: Within normal limits.  ADRENALS: Within normal limits.  KIDNEYS/URETERS: Bilateral renal cysts. No hydronephrosis.    BLADDER: Within normal limits.  REPRODUCTIVE ORGANS: Calcified uterine fibroids.    BOWEL: No bowel obstruction. Right hemicolectomy. Unchanged mild wall thickening of the descending colon and sigmoid colon. Persistent luminal narrowing in the mid sigmoid colon. Decreased colonic distention. No evidence of pneumatosis. Increased colonic stool burden throughout the colon.  PERITONEUM: Trace pelvic fluid.  VESSELS: Atherosclerotic changes.  RETROPERITONEUM/LYMPH NODES: No lymphadenopathy.  ABDOMINAL WALL: Small fat-containing bilateral inguinal hernias.  BONES: Degenerative changes.    IMPRESSION:  Redemonstration of colitis involving descending and sigmoid colon. No colonic pneumatosis. Persistent luminal narrowing in the mid sigmoid colon. Underlying benign or malignant stricture cannot be excluded.    --- End of Report ---

## 2022-07-07 NOTE — PROGRESS NOTE ADULT - SUBJECTIVE AND OBJECTIVE BOX
Progress Note    07-03-22 (4d)    Patient is a 86y old  Female who presents with a chief complaint of large bowel obstruction with fecalith (06 Jul 2022 12:05)      Subjective / Overnight Events :  - No acute events overnight.  - Pt seen and examined at bedside.     Additional ROS (if any):    MEDICATIONS  (STANDING):  amLODIPine   Tablet 10 milliGRAM(s) Oral daily  bisacodyl 5 milliGRAM(s) Oral at bedtime  enoxaparin Injectable 40 milliGRAM(s) SubCutaneous every 24 hours  lisinopril 20 milliGRAM(s) Oral daily  pantoprazole    Tablet 40 milliGRAM(s) Oral before breakfast  polyethylene glycol 3350 17 Gram(s) Oral two times a day  senna 2 Tablet(s) Oral at bedtime    MEDICATIONS  (PRN):  aluminum hydroxide/magnesium hydroxide/simethicone Suspension 30 milliLiter(s) Oral every 4 hours PRN Dyspepsia  melatonin 3 milliGRAM(s) Oral at bedtime PRN Insomnia  ondansetron Injectable 4 milliGRAM(s) IV Push every 8 hours PRN Nausea and/or Vomiting      CAPILLARY BLOOD GLUCOSE        I&O's Summary      PHYSICAL EXAM:  Vital Signs Last 24 Hrs  T(C): 36.4 (07 Jul 2022 05:38), Max: 36.6 (06 Jul 2022 22:00)  T(F): 97.5 (07 Jul 2022 05:38), Max: 97.8 (06 Jul 2022 22:00)  HR: 59 (07 Jul 2022 05:38) (55 - 59)  BP: 129/65 (07 Jul 2022 05:38) (127/87 - 175/63)  BP(mean): --  RR: 17 (07 Jul 2022 05:38) (17 - 18)  SpO2: 100% (07 Jul 2022 05:38) (100% - 100%)    General: NAD, non-toxic appearing   HEENT: PERRLA, EOMi, no scleral icterus  CV: RRR, normal S1 and S2, no m/r/g  Lungs: normal respiratory effort. CTAB, no wheezes, rales, or rhonchi  Abd: soft, nontender, nondistended  Ext: no edema, 2+ peripheral pulses   Pysch: AAOx3, appropriate affect   Neuro: grossly non-focal, moving all extremities spontaneously   Skin: no rashes or lesions     LABS:                          11.4   3.50  )-----------( 294      ( 06 Jul 2022 06:50 )             35.4       WBC Trend: 3.50<--, 4.12<--, 4.80<--  Hb Trend: 11.4<--, 10.9<--, 11.8<--, 12.1<--    07-06    141  |  106  |  9   ----------------------------<  80  4.2   |  26  |  1.03    Ca    10.3      06 Jul 2022 06:50  Phos  2.6     07-06  Mg     1.70     07-06      PT/INR - ( 06 Jul 2022 06:50 )   PT: 13.3 sec;   INR: 1.14 ratio         PTT - ( 06 Jul 2022 06:50 )  PTT:33.4 sec              RADIOLOGY & ADDITIONAL TESTS: Reviewed Progress Note    07-03-22 (4d)    Patient is a 86y old  Female who presents with a chief complaint of large bowel obstruction with fecalith (06 Jul 2022 12:05)      Subjective / Overnight Events :  - No acute events overnight.  - Pt seen and examined at bedside.  Complaining of diffuse abdominal pain and distension.  No BMs yesterday (no enemas given)    Additional ROS (if any):    MEDICATIONS  (STANDING):  amLODIPine   Tablet 10 milliGRAM(s) Oral daily  bisacodyl 5 milliGRAM(s) Oral at bedtime  enoxaparin Injectable 40 milliGRAM(s) SubCutaneous every 24 hours  lisinopril 20 milliGRAM(s) Oral daily  pantoprazole    Tablet 40 milliGRAM(s) Oral before breakfast  polyethylene glycol 3350 17 Gram(s) Oral two times a day  senna 2 Tablet(s) Oral at bedtime    MEDICATIONS  (PRN):  aluminum hydroxide/magnesium hydroxide/simethicone Suspension 30 milliLiter(s) Oral every 4 hours PRN Dyspepsia  melatonin 3 milliGRAM(s) Oral at bedtime PRN Insomnia  ondansetron Injectable 4 milliGRAM(s) IV Push every 8 hours PRN Nausea and/or Vomiting      CAPILLARY BLOOD GLUCOSE        I&O's Summary      PHYSICAL EXAM:  Vital Signs Last 24 Hrs  T(C): 36.4 (07 Jul 2022 05:38), Max: 36.6 (06 Jul 2022 22:00)  T(F): 97.5 (07 Jul 2022 05:38), Max: 97.8 (06 Jul 2022 22:00)  HR: 59 (07 Jul 2022 05:38) (55 - 59)  BP: 129/65 (07 Jul 2022 05:38) (127/87 - 175/63)  BP(mean): --  RR: 17 (07 Jul 2022 05:38) (17 - 18)  SpO2: 100% (07 Jul 2022 05:38) (100% - 100%)    General: NAD, non-toxic appearing   HEENT: PERRLA, EOMi, no scleral icterus  CV: RRR, normal S1 and S2, no m/r/g  Lungs: normal respiratory effort. CTAB, no wheezes, rales, or rhonchi  Abd: distended, diffuse tender to palpation  Ext: no edema, 2+ peripheral pulses   Pysch: AAOx3, appropriate affect   Neuro: grossly non-focal, moving all extremities spontaneously   Skin: no rashes or lesions     LABS:                          11.4   3.50  )-----------( 294      ( 06 Jul 2022 06:50 )             35.4       WBC Trend: 3.50<--, 4.12<--, 4.80<--  Hb Trend: 11.4<--, 10.9<--, 11.8<--, 12.1<--    07-06    141  |  106  |  9   ----------------------------<  80  4.2   |  26  |  1.03    Ca    10.3      06 Jul 2022 06:50  Phos  2.6     07-06  Mg     1.70     07-06      PT/INR - ( 06 Jul 2022 06:50 )   PT: 13.3 sec;   INR: 1.14 ratio         PTT - ( 06 Jul 2022 06:50 )  PTT:33.4 sec              RADIOLOGY & ADDITIONAL TESTS: Reviewed

## 2022-07-07 NOTE — PROVIDER CONTACT NOTE (OTHER) - ACTION/TREATMENT ORDERED:
Continue to monitor, okay to give scheduled Norvasc as per Dr. Rizzo
Continue to monitor.
MD notified and aware. Recheck VS. Continue to monitor.

## 2022-07-07 NOTE — CONSULT NOTE ADULT - ASSESSMENT
83F Cre[place] speaking with hx of HTN, cholecystectomy and colon ca s/p resection 20 years ago presents with LBO. Patient currently passing flatus, last BM 2d ago on a bowel regimen. Repeat CT scan revealing sigmoid stricture.    Plan:   - Patient tolerating CLD; Advance diet as tolerated  - GI recommendations appreciated; patient will undergo colonoscopy likely early next week  - Continue with Miralax BID and enemas to maintain BM   - Please send tumor markers CEA, , CA-125  - Colorectal Surgery will follow closely  - Plan discussed with Colorectal Surgery attending Dr. Martin team Surgery  [id number]  83F Cre[place] speaking with hx of HTN, cholecystectomy and colon ca s/p resection 20 years ago presents with LBO. Patient currently passing flatus, last BM 2d ago on a bowel regimen. Repeat CT scan revealing sigmoid stricture.    Plan:   - Advance diet as tolerated  - GI recommendations appreciated; patient will undergo colonoscopy likely early next week  - Continue with Miralax BID and enemas to maintain BM   - Please send tumor markers CEA, , CA-125  - Colorectal Surgery will follow closely  - Plan discussed with Colorectal Surgery attending Dr. Martin team Surgery  [phone]

## 2022-07-07 NOTE — PROGRESS NOTE ADULT - PROBLEM SELECTOR PLAN 1
CTAP: Possible pneumatosis with ischemic colitis--> benign abdominal exam and lactate 1.0, AOx3  - advancing diet as tolerated  - give enemas and miralax bid  - CTAP after BM  - MRCP on schedule  - GI, surgery following, appreciate recs  - GI may perform inpatient colonoscopy, may require 2 days of bowel prep, pending final GI recs. CTAP: Colonic stricture  - advancing diet as tolerated  - give enemas and miralax bid  - MRCP on schedule  - GI, surgery following, appreciate recs  - per GI, colonoscopy on Monday, bowel prep over weekend  - 2 tap water enemas daily

## 2022-07-07 NOTE — CONSULT NOTE ADULT - SUBJECTIVE AND OBJECTIVE BOX
83F Cre[place] speaking with hx of HTN, cholecystectomy and R hemicolectomy with ileocolic anastomosis 20 years ago presented with 2 weeks of constipation. Reports that she has had pain for the past 2 weeks, passing gas but very little stool. Reports she went to PCP who prescribed her laxatives, without improvement. Denies nausea or vomiting. Patient normally has 2-3 BM a day that are not diarrhea. Patient has yearly colonoscopies and per patient the one last year was normal. Denies fevers/chills/CP/SOB. CT scan showing persistent luminal narrowing in the mid sigmoid colon and underlying benign or malignant stricture cannot be excluded.    Colorectal Surgery consulted for mid sigmoid colon stricture.      Subjective  Patient was seen and examined at bedside. Pt denies any CP, SOB, n/v/d, or chills. Passing flatus, and had 2 BM yesterday after miralax/enemas. Tolerating a CLD.    Vital Signs Last 24 Hrs  T(C): 36.7 (07 Jul 2022 13:05), Max: 36.7 (07 Jul 2022 13:05)  T(F): 98 (07 Jul 2022 13:05), Max: 98 (07 Jul 2022 13:05)  HR: 80 (07 Jul 2022 13:05) (55 - 80)  BP: 149/77 (07 Jul 2022 13:05) (129/65 - 175/63)  BP(mean): --  RR: 18 (07 Jul 2022 13:05) (17 - 18)  SpO2: 100% (07 Jul 2022 13:05) (100% - 100%)    Physical  General; NAD  Respiratory: nonlabored breathing  CV: Regular rate  Abdomen: soft, nondistended, nontender. No rebound or guarding  Extremities: WWP   MSK: Moves all 4 extremities spontaneously        LABS:  cret                        11.4   3.50  )-----------( 294      ( 06 Jul 2022 06:50 )             35.4     07-06    141  |  106  |  9   ----------------------------<  80  4.2   |  26  |  1.03    Ca    10.3      06 Jul 2022 06:50  Phos  2.6     07-06  Mg     1.70     07-06      PT/INR - ( 06 Jul 2022 06:50 )   PT: 13.3 sec;   INR: 1.14 ratio         PTT - ( 06 Jul 2022 06:50 )  PTT:33.4 sec        MEDICATIONS  (STANDING):  amLODIPine   Tablet 10 milliGRAM(s) Oral daily  bisacodyl 5 milliGRAM(s) Oral at bedtime  enoxaparin Injectable 40 milliGRAM(s) SubCutaneous every 24 hours  lisinopril 20 milliGRAM(s) Oral daily  pantoprazole    Tablet 40 milliGRAM(s) Oral before breakfast  polyethylene glycol 3350 17 Gram(s) Oral two times a day  senna 2 Tablet(s) Oral at bedtime    MEDICATIONS  (PRN):  aluminum hydroxide/magnesium hydroxide/simethicone Suspension 30 milliLiter(s) Oral every 4 hours PRN Dyspepsia  melatonin 3 milliGRAM(s) Oral at bedtime PRN Insomnia  ondansetron Injectable 4 milliGRAM(s) IV Push every 8 hours PRN Nausea and/or Vomiting      CT ABDOMEN AND PELVIS IC    PROCEDURE DATE: 07/06/2022        INTERPRETATION: CLINICAL INFORMATION: Constipation. Prior possible pneumatosis. Ischemic colitis.    COMPARISON: CT abdomen pelvis 7/3/2022.    CONTRAST/COMPLICATIONS:  IV Contrast: Omnipaque 350 60 cc administered 4 cc discarded  Oral Contrast: NONE  Complications: None reported at time of study completion    PROCEDURE:  CT of the Abdomen and Pelvis was performed.  Sagittal and coronal reformats were performed.    FINDINGS:  LOWER CHEST: Within normal limits.    LIVER: Within normal limits.  BILE DUCTS: Stable extrahepatic biliary ductal dilatation with distal tapering, which may be related to the cholecystectomy state.  GALLBLADDER: Cholecystectomy.  SPLEEN: Within normal limits.  PANCREAS: Within normal limits.  ADRENALS: Within normal limits.  KIDNEYS/URETERS: Bilateral renal cysts. No hydronephrosis.    BLADDER: Within normal limits.  REPRODUCTIVE ORGANS: Calcified uterine fibroids.    BOWEL: No bowel obstruction. Right hemicolectomy. Unchanged mild wall thickening of the descending colon and sigmoid colon. Persistent luminal narrowing in the mid sigmoid colon. Decreased colonic distention. No evidence of pneumatosis. Increased colonic stool burden throughout the colon.  PERITONEUM: Trace pelvic fluid.  VESSELS: Atherosclerotic changes.  RETROPERITONEUM/LYMPH NODES: No lymphadenopathy.  ABDOMINAL WALL: Small fat-containing bilateral inguinal hernias.  BONES: Degenerative changes.    IMPRESSION:  Redemonstration of colitis involving descending and sigmoid colon. No colonic pneumatosis. Persistent luminal narrowing in the mid sigmoid colon. Underlying benign or malignant stricture cannot be excluded.    --- End of Report ---   COLORECTAL SURGERY CONSULT NOTE    83F [place] speaking with hx of HTN, cholecystectomy and R hemicolectomy with ileocolic anastomosis 20 years ago presented with 2 weeks of constipation. Reports that she has had pain for the past 2 weeks, passing gas but very little stool. Reports she went to PCP who prescribed her laxatives, without improvement. Denies nausea or vomiting. Patient normally has 2-3 BM a day that are not diarrhea. Patient has yearly colonoscopies and per patient the one last year was normal. Denies fevers/chills/CP/SOB. CT scan showing persistent luminal narrowing in the mid sigmoid colon and underlying benign or malignant stricture cannot be excluded.    Colorectal Surgery consulted for mid sigmoid colon stricture.      Subjective  Patient was seen and examined at bedside. Pt denies any CP, SOB, n/v/d, or chills. Passing flatus, and had 2 BM yesterday after miralax/enemas. Tolerating a CLD.    Vital Signs Last 24 Hrs  T(C): 36.7 (07 Jul 2022 13:05), Max: 36.7 (07 Jul 2022 13:05)  T(F): 98 (07 Jul 2022 13:05), Max: 98 (07 Jul 2022 13:05)  HR: 80 (07 Jul 2022 13:05) (55 - 80)  BP: 149/77 (07 Jul 2022 13:05) (129/65 - 175/63)  BP(mean): --  RR: 18 (07 Jul 2022 13:05) (17 - 18)  SpO2: 100% (07 Jul 2022 13:05) (100% - 100%)    Physical  General; NAD  Respiratory: nonlabored breathing  CV: Regular rate  Abdomen: soft, nondistended, nontender. No rebound or guarding  Extremities: WWP   MSK: Moves all 4 extremities spontaneously        LABS:  cret                        11.4   3.50  )-----------( 294      ( 06 Jul 2022 06:50 )             35.4     07-06    141  |  106  |  9   ----------------------------<  80  4.2   |  26  |  1.03    Ca    10.3      06 Jul 2022 06:50  Phos  2.6     07-06  Mg     1.70     07-06      PT/INR - ( 06 Jul 2022 06:50 )   PT: 13.3 sec;   INR: 1.14 ratio         PTT - ( 06 Jul 2022 06:50 )  PTT:33.4 sec        MEDICATIONS  (STANDING):  amLODIPine   Tablet 10 milliGRAM(s) Oral daily  bisacodyl 5 milliGRAM(s) Oral at bedtime  enoxaparin Injectable 40 milliGRAM(s) SubCutaneous every 24 hours  lisinopril 20 milliGRAM(s) Oral daily  pantoprazole    Tablet 40 milliGRAM(s) Oral before breakfast  polyethylene glycol 3350 17 Gram(s) Oral two times a day  senna 2 Tablet(s) Oral at bedtime    MEDICATIONS  (PRN):  aluminum hydroxide/magnesium hydroxide/simethicone Suspension 30 milliLiter(s) Oral every 4 hours PRN Dyspepsia  melatonin 3 milliGRAM(s) Oral at bedtime PRN Insomnia  ondansetron Injectable 4 milliGRAM(s) IV Push every 8 hours PRN Nausea and/or Vomiting      CT ABDOMEN AND PELVIS IC    PROCEDURE DATE: 07/06/2022        INTERPRETATION: CLINICAL INFORMATION: Constipation. Prior possible pneumatosis. Ischemic colitis.    COMPARISON: CT abdomen pelvis 7/3/2022.    CONTRAST/COMPLICATIONS:  IV Contrast: Omnipaque 350 60 cc administered 4 cc discarded  Oral Contrast: NONE  Complications: None reported at time of study completion    PROCEDURE:  CT of the Abdomen and Pelvis was performed.  Sagittal and coronal reformats were performed.    FINDINGS:  LOWER CHEST: Within normal limits.    LIVER: Within normal limits.  BILE DUCTS: Stable extrahepatic biliary ductal dilatation with distal tapering, which may be related to the cholecystectomy state.  GALLBLADDER: Cholecystectomy.  SPLEEN: Within normal limits.  PANCREAS: Within normal limits.  ADRENALS: Within normal limits.  KIDNEYS/URETERS: Bilateral renal cysts. No hydronephrosis.    BLADDER: Within normal limits.  REPRODUCTIVE ORGANS: Calcified uterine fibroids.    BOWEL: No bowel obstruction. Right hemicolectomy. Unchanged mild wall thickening of the descending colon and sigmoid colon. Persistent luminal narrowing in the mid sigmoid colon. Decreased colonic distention. No evidence of pneumatosis. Increased colonic stool burden throughout the colon.  PERITONEUM: Trace pelvic fluid.  VESSELS: Atherosclerotic changes.  RETROPERITONEUM/LYMPH NODES: No lymphadenopathy.  ABDOMINAL WALL: Small fat-containing bilateral inguinal hernias.  BONES: Degenerative changes.    IMPRESSION:  Redemonstration of colitis involving descending and sigmoid colon. No colonic pneumatosis. Persistent luminal narrowing in the mid sigmoid colon. Underlying benign or malignant stricture cannot be excluded.    --- End of Report ---

## 2022-07-07 NOTE — PROGRESS NOTE ADULT - ASSESSMENT
JORGE EDWARDS is a 86y F, Creole speaking, with h/o colon cancer s/p partial colectomy with ileocolic anastomosis (~20 yrs PTA, no chemoRT), SBO 2017 s/p medical management, HTN, prior CCY, and GERD who initially presented to the ED with complaints of worsening constipation x 2 weeks, abdominal pain and bloating, noted to have a LBO on CT for which GI was consulted. No evidence of LBO on repeat CT on 7/3 but possible pneumatosis coli vs stercoral colitis.     # ?LBO  - prior CT reported fecalith LBO, however, CT AP 7/3 said no findings of LBO and reporting L sided colon wall thickening with narrowing concerning for stricture and possible pneumatosis vs stercoral colitis. Abdominal XR 7/5 without evidence of obstruction.   # h/o colon cancer s/p resection  # biliary ductal dilatation with liver chemistries wnl - post CCY changes? MRCP ordered  - Initial CTAP: Nondistended small bowel loops. Right hemicolectomy. Mild distention of hepatic flexure measuring up to 6.6 cm. Remainder of the colon is not distended. Air-fluid level within the colon suggest diarrhea illness. Wall thickening of the splenic flexure, descending and sigmoid colon greatest involving the mid sigmoid colon where there is luminal narrowing and surrounding inflammation. Possible mild pneumatosis coli involving splenic flexure versus air trapped between colon wall and stool. And central intrahepatic and extrahepatic biliary dilation. Common bile duct maximally measures 1.1 cm however it appears to taper distally. Possible hypodensity suggested within the distal CBD suggestive of choledocholithiasis.    Recommendations:  - Unclear whether patient has a stricture and may benefit from colonoscopy for further evaluation at some point if there is not pneumatosis coli  - Please obtain CT A/P w/ IVC to r/o pneumatosis coli  - If negative for pneumatosis coli, will consider colonoscopy, otherwise would obtain surgical input  - Surgery following  - Closely monitor for worsening sx or decline in condition  - Serial abdominal exams with Xray  - Daily CBC, BMP, coags  - Check MRCP given biliary ductal dilation on CT imaging  - Ensure adequate hydration  - Antiemetics as needed  - Pain control, avoid NSAIDs and opioids  - Rest of care per primary    Preliminary note until signed by Attending.    Thank you for involving us in this patient's care.    Patience Solis, PGY5  Gastroenterology/Hepatology Fellow  Available on Microsoft Teams  [phone] ([facility] Short Range Pager)  [phone] (Long Range Pager)    After 5pm, please contact the on-call GI fellow. [phone] JORGE EDWARDS is a 86y F, Creole speaking, with h/o colon cancer s/p partial colectomy with ileocolic anastomosis (~20 yrs PTA, no chemoRT), SBO 2017 s/p medical management, HTN, prior CCY, and GERD who initially presented to the ED with complaints of worsening constipation x 2 weeks, abdominal pain and bloating, noted to have a LBO on CT for which GI was consulted. No evidence of LBO on repeat CT on 7/3 but possible pneumatosis coli vs stercoral colitis.     # ?LBO  - prior CT reported fecalith LBO, however, CT AP 7/3 said no findings of LBO and reporting L sided colon wall thickening with narrowing concerning for stricture and possible pneumatosis vs stercoral colitis. Abdominal XR 7/5 without evidence of obstruction.   # h/o colon cancer s/p resection  # biliary ductal dilatation with liver chemistries wnl - post CCY changes? MRCP ordered  - Initial CTAP 7/3 showed nondistended small bowel loops, R hemicolectomy. Mild distention of hepatic flexure measuring up to 6.6 cm. Remainder of the colon is not distended. Air-fluid level within the colon suggest diarrhea illness. Wall thickening of the splenic flexure, descending and sigmoid colon greatest involving the mid sigmoid colon where there is luminal narrowing and surrounding inflammation. Possible mild pneumatosis coli involving splenic flexure versus air trapped between colon wall and stool. And central intrahepatic and extrahepatic biliary dilation. Common bile duct maximally measures 1.1 cm however it appears to taper distally. Possible hypodensity suggested within the distal CBD suggestive of choledocholithiasis.  - Repeat Ct A/P 7/6 - no bowel obstruction, persistent luminal narrowing in the midsigmoid colon, decreased colonic distention. No evidence of pneumatosis, increased colonic stool burden throughout the colon    Recommendations:  #Luminal narrowing of midsigmoid colon. No pneumatosis coli seen on repeat CT 7/6  - plan for colonoscopy to evaluate for possible stricture. Likely early next week given persistent stool burden   - recommend 2 tap water enemas today  - Surgery following  - Closely monitor for worsening sx or decline in condition  - Serial abdominal exams with Xray  - Daily CBC, BMP, coags  - Ensure adequate hydration  - Antiemetics as needed  - Pain control, avoid NSAIDs and opioids  - Rest of care per primary    #Biliary ductal dilation on CT  - Awaiting MRCP      Preliminary note until signed by Attending.    Thank you for involving us in this patient's care.    Patience Solis, PGY5  Gastroenterology/Hepatology Fellow  Available on [employer] Teams  [phone] ([facility] [facility] Short Range Pager)  [phone] (Long Range Pager)    After 5pm, please contact the on-call GI fellow. [phone]

## 2022-07-07 NOTE — PROGRESS NOTE ADULT - ASSESSMENT
Patient is a 87 y/o Creole-speaking F with hx of colon ca s/p resection 20 yrs ago, s/p cholecystectomy, HTN, and GERD presenting with constipation 2/2 LBO.  7/3 CTAP with possible pneumatosis with ischemic colitis.  Patient with benign abdominal exam, normal lactate, afebrile, at baseline mental status and not meeting sepsis criteria.  Due to benign clinical presentation and lab work, performed CTAP after BM, redemonstration of colitis, with potential benign or malignant stricture.

## 2022-07-07 NOTE — PROVIDER CONTACT NOTE (OTHER) - BACKGROUND
85 y/o female admitted with constipation, history of HTN, GERD and colon cancer
85 y/o female admitted with constipation, history of HTN, GERD and colon cancer
87 y/o female admitted with constipation, history of HTN, GERD and colon cancer

## 2022-07-07 NOTE — CHART NOTE - NSCHARTNOTEFT_GEN_A_CORE
Patient to be followed by A team (Colorectal Surgery) for sigmoid stricture and colonoscopy scheduled for next week.    Please call or page B team Surgery if needed.    B team Surgery  [phone]

## 2022-07-07 NOTE — PROGRESS NOTE ADULT - SUBJECTIVE AND OBJECTIVE BOX
Gastroenterology/Hepatology Progress Note    Interval Events: Patient states last BM was two nights ago. Drank bottle of Mg citrate last night. States she has worsening diffuse abdominal pain today. Denies nausea or vomiting    Allergies:  No Known Allergies    Hospital Medications:  aluminum hydroxide/magnesium hydroxide/simethicone Suspension 30 milliLiter(s) Oral every 4 hours PRN  amLODIPine   Tablet 10 milliGRAM(s) Oral daily  bisacodyl 5 milliGRAM(s) Oral at bedtime  enoxaparin Injectable 40 milliGRAM(s) SubCutaneous every 24 hours  lisinopril 20 milliGRAM(s) Oral daily  melatonin 3 milliGRAM(s) Oral at bedtime PRN  ondansetron Injectable 4 milliGRAM(s) IV Push every 8 hours PRN  pantoprazole    Tablet 40 milliGRAM(s) Oral before breakfast  polyethylene glycol 3350 17 Gram(s) Oral two times a day  senna 2 Tablet(s) Oral at bedtime      ROS: 14 point ROS negative unless otherwise state in subjective    PHYSICAL EXAM:   Vital Signs:  Vital Signs Last 24 Hrs  T(C): 36.4 (07 Jul 2022 05:38), Max: 36.6 (06 Jul 2022 22:00)  T(F): 97.5 (07 Jul 2022 05:38), Max: 97.8 (06 Jul 2022 22:00)  HR: 59 (07 Jul 2022 05:38) (55 - 59)  BP: 129/65 (07 Jul 2022 05:38) (127/87 - 175/63)  BP(mean): --  RR: 17 (07 Jul 2022 05:38) (17 - 18)  SpO2: 100% (07 Jul 2022 05:38) (100% - 100%)  Daily     Daily     GENERAL:  No acute distress  HEENT:  NCAT, no scleral icterus  CHEST: no resp distress  HEART:  RRR  ABDOMEN:  Distended, nontender to palpation  EXTREMITIES:  No cyanosis, clubbing, or edema  SKIN:  No rash/erythema/ecchymoses/petechiae/wounds/abscess/warm/dry  NEURO:  Alert and oriented x 3, no asterixis, no tremor    LABS:                        11.4   3.50  )-----------( 294      ( 06 Jul 2022 06:50 )             35.4       07-06    141  |  106  |  9   ----------------------------<  80  4.2   |  26  |  1.03    Ca    10.3      06 Jul 2022 06:50  Phos  2.6     07-06  Mg     1.70     07-06        PT/INR - ( 06 Jul 2022 06:50 )   PT: 13.3 sec;   INR: 1.14 ratio         PTT - ( 06 Jul 2022 06:50 )  PTT:33.4 sec          Imaging:  reviewed

## 2022-07-07 NOTE — PROVIDER CONTACT NOTE (OTHER) - ASSESSMENT
HR-54 BP-161/72 Patient asymptomatic
HR-55 BP-175/63 Patient asymptomatic
HR-48 BP-166/72 Patient asymptomatic

## 2022-07-07 NOTE — PROGRESS NOTE ADULT - PROBLEM SELECTOR PLAN 2
home amlodipine-benazepril, atenolol  - restarted amlodipine 10 mg., lisinopril restarted at 20mg  - hold atenolol due to low HR

## 2022-07-07 NOTE — PROGRESS NOTE ADULT - ASSESSMENT
83F Creole speaking with hx of HTN, cholecystectomy and colon ca s/p resection 20 years ago presents with LBO. Patient not completely obstructed, currently passing flatus and having bowel movements.    Plan:   - Patient tolerating CLD; Advance diet as tolerated  - Will involve colorectal surgery for evaluation of sigmoid narrowing/stricture on CT; pending colonoscopy  - GI recommendations appreciated; patient will undergo colonoscopy for further evaluation on Friday  - Repeat CTAP without pneumatosis coli  - Continue with Miralax BID and enemas to maintain BM   - Closely monitor for worsening sx or decline in condition  - Surgery will follow closely    B team Surgery  [id number]

## 2022-07-08 ENCOUNTER — RESULT REVIEW (OUTPATIENT)
Age: 87
End: 2022-07-08

## 2022-07-08 DIAGNOSIS — N17.9 ACUTE KIDNEY FAILURE, UNSPECIFIED: ICD-10-CM

## 2022-07-08 LAB
ANION GAP SERPL CALC-SCNC: 12 MMOL/L — SIGNIFICANT CHANGE UP (ref 7–14)
APTT BLD: 34.3 SEC — SIGNIFICANT CHANGE UP (ref 27–36.3)
BUN SERPL-MCNC: 15 MG/DL — SIGNIFICANT CHANGE UP (ref 7–23)
CALCIUM SERPL-MCNC: 10.1 MG/DL — SIGNIFICANT CHANGE UP (ref 8.4–10.5)
CANCER AG125 SERPL-ACNC: 15 U/ML — SIGNIFICANT CHANGE UP
CANCER AG19-9 SERPL-ACNC: 27 U/ML — SIGNIFICANT CHANGE UP
CEA SERPL-MCNC: 2.2 NG/ML — SIGNIFICANT CHANGE UP (ref 1–3.8)
CHLORIDE SERPL-SCNC: 102 MMOL/L — SIGNIFICANT CHANGE UP (ref 98–107)
CO2 SERPL-SCNC: 24 MMOL/L — SIGNIFICANT CHANGE UP (ref 22–31)
CREAT ?TM UR-MCNC: 88 MG/DL — SIGNIFICANT CHANGE UP
CREAT SERPL-MCNC: 1.6 MG/DL — HIGH (ref 0.5–1.3)
EGFR: 31 ML/MIN/1.73M2 — LOW
GLUCOSE SERPL-MCNC: 85 MG/DL — SIGNIFICANT CHANGE UP (ref 70–99)
HCT VFR BLD CALC: 35.4 % — SIGNIFICANT CHANGE UP (ref 34.5–45)
HGB BLD-MCNC: 11.4 G/DL — LOW (ref 11.5–15.5)
INR BLD: 1.11 RATIO — SIGNIFICANT CHANGE UP (ref 0.88–1.16)
MAGNESIUM SERPL-MCNC: 2.2 MG/DL — SIGNIFICANT CHANGE UP (ref 1.6–2.6)
MCHC RBC-ENTMCNC: 29.5 PG — SIGNIFICANT CHANGE UP (ref 27–34)
MCHC RBC-ENTMCNC: 32.2 GM/DL — SIGNIFICANT CHANGE UP (ref 32–36)
MCV RBC AUTO: 91.7 FL — SIGNIFICANT CHANGE UP (ref 80–100)
NRBC # BLD: 0 /100 WBCS — SIGNIFICANT CHANGE UP
NRBC # FLD: 0 K/UL — SIGNIFICANT CHANGE UP
PHOSPHATE SERPL-MCNC: 3.6 MG/DL — SIGNIFICANT CHANGE UP (ref 2.5–4.5)
PLATELET # BLD AUTO: 336 K/UL — SIGNIFICANT CHANGE UP (ref 150–400)
POTASSIUM SERPL-MCNC: 4 MMOL/L — SIGNIFICANT CHANGE UP (ref 3.5–5.3)
POTASSIUM SERPL-SCNC: 4 MMOL/L — SIGNIFICANT CHANGE UP (ref 3.5–5.3)
PROTHROM AB SERPL-ACNC: 12.9 SEC — SIGNIFICANT CHANGE UP (ref 10.5–13.4)
RBC # BLD: 3.86 M/UL — SIGNIFICANT CHANGE UP (ref 3.8–5.2)
RBC # FLD: 13.4 % — SIGNIFICANT CHANGE UP (ref 10.3–14.5)
SODIUM SERPL-SCNC: 138 MMOL/L — SIGNIFICANT CHANGE UP (ref 135–145)
SODIUM UR-SCNC: 39 MMOL/L — SIGNIFICANT CHANGE UP
WBC # BLD: 4.68 K/UL — SIGNIFICANT CHANGE UP (ref 3.8–10.5)
WBC # FLD AUTO: 4.68 K/UL — SIGNIFICANT CHANGE UP (ref 3.8–10.5)

## 2022-07-08 PROCEDURE — 99232 SBSQ HOSP IP/OBS MODERATE 35: CPT | Mod: GC,25

## 2022-07-08 PROCEDURE — 45380 COLONOSCOPY AND BIOPSY: CPT | Mod: GC

## 2022-07-08 PROCEDURE — 99233 SBSQ HOSP IP/OBS HIGH 50: CPT | Mod: GC

## 2022-07-08 PROCEDURE — 88305 TISSUE EXAM BY PATHOLOGIST: CPT | Mod: 26

## 2022-07-08 RX ORDER — SODIUM CHLORIDE 9 MG/ML
1000 INJECTION, SOLUTION INTRAVENOUS
Refills: 0 | Status: DISCONTINUED | OUTPATIENT
Start: 2022-07-08 | End: 2022-07-10

## 2022-07-08 RX ADMIN — SENNA PLUS 2 TABLET(S): 8.6 TABLET ORAL at 21:27

## 2022-07-08 RX ADMIN — SODIUM CHLORIDE 75 MILLILITER(S): 9 INJECTION, SOLUTION INTRAVENOUS at 12:41

## 2022-07-08 RX ADMIN — SODIUM CHLORIDE 75 MILLILITER(S): 9 INJECTION, SOLUTION INTRAVENOUS at 21:27

## 2022-07-08 RX ADMIN — PANTOPRAZOLE SODIUM 40 MILLIGRAM(S): 20 TABLET, DELAYED RELEASE ORAL at 06:43

## 2022-07-08 RX ADMIN — AMLODIPINE BESYLATE 10 MILLIGRAM(S): 2.5 TABLET ORAL at 05:28

## 2022-07-08 RX ADMIN — Medication 5 MILLIGRAM(S): at 21:27

## 2022-07-08 RX ADMIN — Medication 3 MILLIGRAM(S): at 21:27

## 2022-07-08 RX ADMIN — POLYETHYLENE GLYCOL 3350 17 GRAM(S): 17 POWDER, FOR SOLUTION ORAL at 05:29

## 2022-07-08 RX ADMIN — LISINOPRIL 20 MILLIGRAM(S): 2.5 TABLET ORAL at 05:29

## 2022-07-08 NOTE — PROGRESS NOTE ADULT - PROBLEM SELECTOR PLAN 1
CTAP: Colonic stricture  - advancing diet as tolerated  - give enemas and miralax bid  - MRCP on schedule  - GI, surgery following, appreciate recs  - per GI, colonoscopy on Monday, bowel prep over weekend  - 2 tap water enemas daily CTAP: Colonic stricture  - advancing diet as tolerated  - give enemas and miralax bid  - MRCP on schedule  - GI, surgery following, appreciate recs  - per GI, flexible sigmoidoscopy  - 2 tap water enemas daily for stool passage CTAP: Colonic stricture  - advancing diet as tolerated  - give enemas and miralax bid  - MRCP on schedule  - GI, surgery following, appreciate recs  - per GI, flexible sigmoidoscopy  - 2 tap water enemas daily for stool passage  - tumor markers wnl

## 2022-07-08 NOTE — PROGRESS NOTE ADULT - PROBLEM SELECTOR PLAN 2
home amlodipine-benazepril, atenolol  - restarted amlodipine 10 mg., lisinopril restarted at 20mg  - hold atenolol due to low HR Cr from 1.03 to 1.6  - likely 2/2 poor PO intake and JJ  - 1L IVF  - f/u Jenna and U[facility]  - if Cr uptrends, switch to hepQ for DVT ppx

## 2022-07-08 NOTE — PROGRESS NOTE ADULT - SUBJECTIVE AND OBJECTIVE BOX
Surgery Progress Note    SUBJECTIVE: Pt seen and examined at bedside. Patient comfortable and in no-apparent distress. No nausea, vomiting, no pain, passing gas and stool with bowel prep.     Vital Signs Last 24 Hrs  T(C): 36.6 (08 Jul 2022 15:38), Max: 36.8 (08 Jul 2022 05:20)  T(F): 97.8 (08 Jul 2022 15:38), Max: 98.2 (08 Jul 2022 05:20)  HR: 66 (08 Jul 2022 15:38) (57 - 75)  BP: 153/85 (08 Jul 2022 15:38) (118/60 - 153/85)  BP(mean): --  RR: 17 (08 Jul 2022 15:38) (14 - 18)  SpO2: 95% (08 Jul 2022 15:38) (95% - 100%)    Parameters below as of 08 Jul 2022 14:40  Patient On (Oxygen Delivery Method): room air        Physical Exam:  General Appearance: Appears well, NAD  Respiratory: No labored breathing  CV: Pulse regularly present  Abdomen: Soft, nontender, nondistended    LABS:                        11.4   4.68  )-----------( 336      ( 08 Jul 2022 06:14 )             35.4     07-08    138  |  102  |  15  ----------------------------<  85  4.0   |  24  |  1.60<H>    Ca    10.1      08 Jul 2022 06:14  Phos  3.6     07-08  Mg     2.20     07-08      PT/INR - ( 08 Jul 2022 06:14 )   PT: 12.9 sec;   INR: 1.11 ratio         PTT - ( 08 Jul 2022 06:14 )  PTT:34.3 sec      INs and OUTs:

## 2022-07-08 NOTE — PROGRESS NOTE ADULT - ASSESSMENT
Assessment: 83F Cre[place] speaking with hx of HTN, cholecystectomy and colon ca s/p resection 20 years ago presents with LBO. Patient currently passing flatus, last BM 2d ago on a bowel regimen. Repeat CT scan revealing sigmoid stricture. Colonoscopy done 7/8.    Recommendations:  - not clinically obstructed  - C- scope results appreciated, follow up biopsies   - Follow up tumor markers  - No emergent surgical intervention indicated at this time     A Team Surgery   [id number]

## 2022-07-08 NOTE — PROGRESS NOTE ADULT - ASSESSMENT
JORGE EDWARDS is a 86y F, Creole speaking, with h/o colon cancer s/p partial colectomy with ileocolic anastomosis (~20 yrs PTA, no chemoRT), SBO 2017 s/p medical management, HTN, prior CCY, and GERD who initially presented to the ED with complaints of worsening constipation x 2 weeks, abdominal pain and bloating, noted to have a LBO on CT for which GI was consulted. No evidence of LBO on repeat CT on 7/3 but possible pneumatosis coli vs stercoral colitis.     # ?LBO  - prior CT reported fecalith LBO, however, CT AP 7/3 said no findings of LBO and reporting L sided colon wall thickening with narrowing concerning for stricture and possible pneumatosis vs stercoral colitis. Abdominal XR 7/5 without evidence of obstruction.   # h/o colon cancer s/p resection  # biliary ductal dilatation with liver chemistries wnl - post CCY changes? MRCP ordered  - Initial CTAP 7/3 showed nondistended small bowel loops, R hemicolectomy. Mild distention of hepatic flexure measuring up to 6.6 cm. Remainder of the colon is not distended. Air-fluid level within the colon suggest diarrhea illness. Wall thickening of the splenic flexure, descending and sigmoid colon greatest involving the mid sigmoid colon where there is luminal narrowing and surrounding inflammation. Possible mild pneumatosis coli involving splenic flexure versus air trapped between colon wall and stool. And central intrahepatic and extrahepatic biliary dilation. Common bile duct maximally measures 1.1 cm however it appears to taper distally. Possible hypodensity suggested within the distal CBD suggestive of choledocholithiasis.  - Repeat Ct A/P 7/6 - no bowel obstruction, persistent luminal narrowing in the midsigmoid colon, decreased colonic distention. No evidence of pneumatosis, increased colonic stool burden throughout the colon    Recommendations:  #Luminal narrowing of midsigmoid colon. No pneumatosis coli seen on repeat CT 7/6  - will perform flexible sigmoidoscopy on patient today, please give two tap water enemas this am  - Surgery following  - Closely monitor for worsening sx or decline in condition  - Serial abdominal exams with Xray  - Daily CBC, BMP, coags  - Ensure adequate hydration  - Antiemetics as needed  - Pain control, avoid NSAIDs and opioids  - Rest of care per primary    #Biliary ductal dilation on CT  - Awaiting MRCP      Preliminary note until signed by Attending.    Thank you for involving us in this patient's care.    Patience Solis, PGY5  Gastroenterology/Hepatology Fellow  Available on [employer] Teams  [phone] ([employer] Short Range Pager)  [phone] (Long Range Pager)    After 5pm, please contact the on-call GI fellow. [phone]

## 2022-07-08 NOTE — PROGRESS NOTE ADULT - ASSESSMENT
Patient is a 87 y/o Creole-speaking F with hx of colon ca s/p resection 20 yrs ago, s/p cholecystectomy, HTN, and GERD presenting with constipation 2/2 LBO.  7/3 CTAP with possible pneumatosis with ischemic colitis.  Patient with benign abdominal exam, normal lactate, afebrile, at baseline mental status and not meeting sepsis criteria.  Due to benign clinical presentation and lab work, performed CTAP after BM, redemonstration of colitis, with potential benign or malignant stricture.   Patient is a 87 y/o Creole-speaking F with hx of colon ca s/p resection 20 yrs ago, s/p cholecystectomy, HTN, and GERD presenting with constipation 2/2 LBO.  7/3 CTAP with possible pneumatosis with ischemic colitis.  Patient with benign abdominal exam, normal lactate, afebrile, at baseline mental status and not meeting sepsis criteria.  Due to benign clinical presentation and lab work, performed CTAP after BM, redemonstration of colitis, with potential benign or malignant stricture.  Patient planned for flexible sigmoidoscopy.

## 2022-07-08 NOTE — PROGRESS NOTE ADULT - SUBJECTIVE AND OBJECTIVE BOX
Progress Note    07-03-22 (5d)    Patient is a 86y old  Female who presents with a chief complaint of large bowel obstruction with fecalith (08 Jul 2022 09:19)      Subjective / Overnight Events :  - No acute events overnight.  - Pt seen and examined at bedside.     Additional ROS (if any):    MEDICATIONS  (STANDING):  amLODIPine   Tablet 10 milliGRAM(s) Oral daily  bisacodyl 5 milliGRAM(s) Oral at bedtime  enoxaparin Injectable 40 milliGRAM(s) SubCutaneous every 24 hours  lactated ringers. 1000 milliLiter(s) (75 mL/Hr) IV Continuous <Continuous>  lisinopril 20 milliGRAM(s) Oral daily  pantoprazole    Tablet 40 milliGRAM(s) Oral before breakfast  polyethylene glycol 3350 17 Gram(s) Oral two times a day  senna 2 Tablet(s) Oral at bedtime    MEDICATIONS  (PRN):  aluminum hydroxide/magnesium hydroxide/simethicone Suspension 30 milliLiter(s) Oral every 4 hours PRN Dyspepsia  melatonin 3 milliGRAM(s) Oral at bedtime PRN Insomnia  ondansetron Injectable 4 milliGRAM(s) IV Push every 8 hours PRN Nausea and/or Vomiting      CAPILLARY BLOOD GLUCOSE        I&O's Summary      PHYSICAL EXAM:  Vital Signs Last 24 Hrs  T(C): 36.8 (08 Jul 2022 05:20), Max: 36.8 (08 Jul 2022 05:20)  T(F): 98.2 (08 Jul 2022 05:20), Max: 98.2 (08 Jul 2022 05:20)  HR: 58 (08 Jul 2022 05:20) (58 - 80)  BP: 147/62 (08 Jul 2022 05:20) (140/84 - 149/77)  BP(mean): --  RR: 18 (08 Jul 2022 05:20) (18 - 18)  SpO2: 99% (08 Jul 2022 05:20) (99% - 100%)    Parameters below as of 08 Jul 2022 05:20  Patient On (Oxygen Delivery Method): room air        General: NAD, non-toxic appearing   HEENT: PERRLA, EOMi, no scleral icterus  CV: RRR, normal S1 and S2, no m/r/g  Lungs: normal respiratory effort. CTAB, no wheezes, rales, or rhonchi  Abd: soft, nontender, nondistended  Ext: no edema, 2+ peripheral pulses   Pysch: AAOx3, appropriate affect   Neuro: grossly non-focal, moving all extremities spontaneously   Skin: no rashes or lesions     LABS:                          11.4   4.68  )-----------( 336      ( 08 Jul 2022 06:14 )             35.4       WBC Trend: 4.68<--, 3.50<--, 4.12<--  Hb Trend: 11.4<--, 11.4<--, 10.9<--, 11.8<--, 12.1<--    07-08    138  |  102  |  15  ----------------------------<  85  4.0   |  24  |  1.60<H>    Ca    10.1      08 Jul 2022 06:14  Phos  3.6     07-08  Mg     2.20     07-08      PT/INR - ( 08 Jul 2022 06:14 )   PT: 12.9 sec;   INR: 1.11 ratio         PTT - ( 08 Jul 2022 06:14 )  PTT:34.3 sec              RADIOLOGY & ADDITIONAL TESTS: Reviewed Progress Note    07-03-22 (5d)    Patient is a 86y old  Female who presents with a chief complaint of large bowel obstruction with fecalith (08 Jul 2022 09:19)      Subjective / Overnight Events :  - No acute events overnight.  Had 1 BM yesterday and this morning.  Given 2 tap water enemas and had an additional BM.  Flexible sigmoidoscopy planned for today.  - Pt seen and examined at bedside.  No complaints, feels fine.  No abdominal pain, distension, gas, n/v.    Additional ROS (if any):    MEDICATIONS  (STANDING):  amLODIPine   Tablet 10 milliGRAM(s) Oral daily  bisacodyl 5 milliGRAM(s) Oral at bedtime  enoxaparin Injectable 40 milliGRAM(s) SubCutaneous every 24 hours  lactated ringers. 1000 milliLiter(s) (75 mL/Hr) IV Continuous <Continuous>  lisinopril 20 milliGRAM(s) Oral daily  pantoprazole    Tablet 40 milliGRAM(s) Oral before breakfast  polyethylene glycol 3350 17 Gram(s) Oral two times a day  senna 2 Tablet(s) Oral at bedtime    MEDICATIONS  (PRN):  aluminum hydroxide/magnesium hydroxide/simethicone Suspension 30 milliLiter(s) Oral every 4 hours PRN Dyspepsia  melatonin 3 milliGRAM(s) Oral at bedtime PRN Insomnia  ondansetron Injectable 4 milliGRAM(s) IV Push every 8 hours PRN Nausea and/or Vomiting      CAPILLARY BLOOD GLUCOSE        I&O's Summary      PHYSICAL EXAM:  Vital Signs Last 24 Hrs  T(C): 36.8 (08 Jul 2022 05:20), Max: 36.8 (08 Jul 2022 05:20)  T(F): 98.2 (08 Jul 2022 05:20), Max: 98.2 (08 Jul 2022 05:20)  HR: 58 (08 Jul 2022 05:20) (58 - 80)  BP: 147/62 (08 Jul 2022 05:20) (140/84 - 149/77)  BP(mean): --  RR: 18 (08 Jul 2022 05:20) (18 - 18)  SpO2: 99% (08 Jul 2022 05:20) (99% - 100%)    Parameters below as of 08 Jul 2022 05:20  Patient On (Oxygen Delivery Method): room air        General: NAD, non-toxic appearing   HEENT: PERRLA, EOMi, no scleral icterus  CV: RRR, normal S1 and S2, no m/r/g  Lungs: normal respiratory effort. CTAB, no wheezes, rales, or rhonchi  Abd: soft, nontender, nondistended  Ext: no edema, 2+ peripheral pulses   Pysch: AAOx3, appropriate affect   Neuro: grossly non-focal, moving all extremities spontaneously   Skin: no rashes or lesions     LABS:                          11.4   4.68  )-----------( 336      ( 08 Jul 2022 06:14 )             35.4       WBC Trend: 4.68<--, 3.50<--, 4.12<--  Hb Trend: 11.4<--, 11.4<--, 10.9<--, 11.8<--, 12.1<--    07-08    138  |  102  |  15  ----------------------------<  85  4.0   |  24  |  1.60<H>    Ca    10.1      08 Jul 2022 06:14  Phos  3.6     07-08  Mg     2.20     07-08      PT/INR - ( 08 Jul 2022 06:14 )   PT: 12.9 sec;   INR: 1.11 ratio         PTT - ( 08 Jul 2022 06:14 )  PTT:34.3 sec              RADIOLOGY & ADDITIONAL TESTS: Reviewed Progress Note    07-03-22 (5d)    Patient is a 86y old  Female who presents with a chief complaint of large bowel obstruction with fecalith (08 Jul 2022 09:19)      Subjective / Overnight Events :  - No acute events overnight.  Had 1 BM yesterday and this morning.  Given 2 tap water enemas and had an additional BM.  Flexible sigmoidoscopy planned for today.  - Pt seen and examined at bedside.  No complaints, feels fine.  No abdominal pain, distension, gas, n/v.    Additional ROS (if any):    MEDICATIONS  (STANDING):  amLODIPine   Tablet 10 milliGRAM(s) Oral daily  bisacodyl 5 milliGRAM(s) Oral at bedtime  enoxaparin Injectable 40 milliGRAM(s) SubCutaneous every 24 hours  lactated ringers. 1000 milliLiter(s) (75 mL/Hr) IV Continuous <Continuous>  lisinopril 20 milliGRAM(s) Oral daily  pantoprazole    Tablet 40 milliGRAM(s) Oral before breakfast  polyethylene glycol 3350 17 Gram(s) Oral two times a day  senna 2 Tablet(s) Oral at bedtime    MEDICATIONS  (PRN):  aluminum hydroxide/magnesium hydroxide/simethicone Suspension 30 milliLiter(s) Oral every 4 hours PRN Dyspepsia  melatonin 3 milliGRAM(s) Oral at bedtime PRN Insomnia  ondansetron Injectable 4 milliGRAM(s) IV Push every 8 hours PRN Nausea and/or Vomiting      CAPILLARY BLOOD GLUCOSE        I&O's Summary      PHYSICAL EXAM:  Vital Signs Last 24 Hrs  T(C): 36.8 (08 Jul 2022 05:20), Max: 36.8 (08 Jul 2022 05:20)  T(F): 98.2 (08 Jul 2022 05:20), Max: 98.2 (08 Jul 2022 05:20)  HR: 58 (08 Jul 2022 05:20) (58 - 80)  BP: 147/62 (08 Jul 2022 05:20) (140/84 - 149/77)  BP(mean): --  RR: 18 (08 Jul 2022 05:20) (18 - 18)  SpO2: 99% (08 Jul 2022 05:20) (99% - 100%)    Parameters below as of 08 Jul 2022 05:20  Patient On (Oxygen Delivery Method): room air        General: NAD, non-toxic appearing   HEENT: PERRLA, EOMi, no scleral icterus  CV: RRR, normal S1 and S2, no m/r/g  Lungs: normal respiratory effort. CTAB, no wheezes, rales, or rhonchi  Abd: soft, nontender, nondistended  Ext: no edema, 2+ peripheral pulses   Pysch: AAOx3, appropriate affect   Neuro: grossly non-focal, moving all extremities spontaneously   Skin: no rashes or lesions     LABS:                          11.4   4.68  )-----------( 336      ( 08 Jul 2022 06:14 )             35.4       WBC Trend: 4.68<--, 3.50<--, 4.12<--  Hb Trend: 11.4<--, 11.4<--, 10.9<--, 11.8<--, 12.1<--    07-08    138  |  102  |  15  ----------------------------<  85  4.0   |  24  |  1.60<H>    Ca    10.1      08 Jul 2022 06:14  Phos  3.6     07-08  Mg     2.20     07-08      PT/INR - ( 08 Jul 2022 06:14 )   PT: 12.9 sec;   INR: 1.11 ratio         PTT - ( 08 Jul 2022 06:14 )  PTT:34.3 sec    CEA 2.2  CA-125 15   27          RADIOLOGY & ADDITIONAL TESTS: Reviewed

## 2022-07-08 NOTE — PROGRESS NOTE ADULT - SUBJECTIVE AND OBJECTIVE BOX
Gastroenterology/Hepatology Progress Note    Interval Events: Patient with 3 BMs since receiving enemas yesterday. Denies nausea, vomiting. Abdominal discomfort improved    Allergies:  No Known Allergies      Hospital Medications:  aluminum hydroxide/magnesium hydroxide/simethicone Suspension 30 milliLiter(s) Oral every 4 hours PRN  amLODIPine   Tablet 10 milliGRAM(s) Oral daily  bisacodyl 5 milliGRAM(s) Oral at bedtime  enoxaparin Injectable 40 milliGRAM(s) SubCutaneous every 24 hours  lisinopril 20 milliGRAM(s) Oral daily  melatonin 3 milliGRAM(s) Oral at bedtime PRN  ondansetron Injectable 4 milliGRAM(s) IV Push every 8 hours PRN  pantoprazole    Tablet 40 milliGRAM(s) Oral before breakfast  polyethylene glycol 3350 17 Gram(s) Oral two times a day  senna 2 Tablet(s) Oral at bedtime      ROS: 14 point ROS negative unless otherwise state in subjective    PHYSICAL EXAM:   Vital Signs:  Vital Signs Last 24 Hrs  T(C): 36.8 (08 Jul 2022 05:20), Max: 36.8 (08 Jul 2022 05:20)  T(F): 98.2 (08 Jul 2022 05:20), Max: 98.2 (08 Jul 2022 05:20)  HR: 58 (08 Jul 2022 05:20) (58 - 80)  BP: 147/62 (08 Jul 2022 05:20) (140/84 - 149/77)  BP(mean): --  RR: 18 (08 Jul 2022 05:20) (18 - 18)  SpO2: 99% (08 Jul 2022 05:20) (99% - 100%)    Parameters below as of 08 Jul 2022 05:20  Patient On (Oxygen Delivery Method): room air      Daily     Daily     GENERAL:  No acute distress  HEENT:  NCAT, no scleral icterus  CHEST: no resp distress  HEART:  RRR  ABDOMEN:  Soft, non-tender, distention improved from yesterday, normoactive bowel sounds, no masses  EXTREMITIES:  No cyanosis, clubbing, or edema  SKIN:  No rash/erythema/ecchymoses/petechiae/wounds/abscess/warm/dry  NEURO:  Alert and oriented x 3, no asterixis, no tremor    LABS:                        11.4   4.68  )-----------( 336      ( 08 Jul 2022 06:14 )             35.4     Mean Cell Volume: 91.7 fL (07-08-22 @ 06:14)    07-08    138  |  102  |  15  ----------------------------<  85  4.0   |  24  |  1.60<H>    Ca    10.1      08 Jul 2022 06:14  Phos  3.6     07-08  Mg     2.20     07-08        PT/INR - ( 08 Jul 2022 06:14 )   PT: 12.9 sec;   INR: 1.11 ratio         PTT - ( 08 Jul 2022 06:14 )  PTT:34.3 sec          Imaging:  reviewed

## 2022-07-08 NOTE — PROGRESS NOTE ADULT - PROBLEM SELECTOR PLAN 3
on pantoprazole 40mg qd  -continue pantoprazole home amlodipine-benazepril, atenolol  - restarted amlodipine 10 mg., lisinopril restarted at 20mg  - hold atenolol due to low HR

## 2022-07-09 LAB
ANION GAP SERPL CALC-SCNC: 14 MMOL/L — SIGNIFICANT CHANGE UP (ref 7–14)
BUN SERPL-MCNC: 18 MG/DL — SIGNIFICANT CHANGE UP (ref 7–23)
CALCIUM SERPL-MCNC: 10.2 MG/DL — SIGNIFICANT CHANGE UP (ref 8.4–10.5)
CHLORIDE SERPL-SCNC: 104 MMOL/L — SIGNIFICANT CHANGE UP (ref 98–107)
CO2 SERPL-SCNC: 21 MMOL/L — LOW (ref 22–31)
CREAT SERPL-MCNC: 1.45 MG/DL — HIGH (ref 0.5–1.3)
EGFR: 35 ML/MIN/1.73M2 — LOW
GLUCOSE SERPL-MCNC: 80 MG/DL — SIGNIFICANT CHANGE UP (ref 70–99)
MAGNESIUM SERPL-MCNC: 2.1 MG/DL — SIGNIFICANT CHANGE UP (ref 1.6–2.6)
PHOSPHATE SERPL-MCNC: 2.8 MG/DL — SIGNIFICANT CHANGE UP (ref 2.5–4.5)
POTASSIUM SERPL-MCNC: 5 MMOL/L — SIGNIFICANT CHANGE UP (ref 3.5–5.3)
POTASSIUM SERPL-SCNC: 5 MMOL/L — SIGNIFICANT CHANGE UP (ref 3.5–5.3)
SODIUM SERPL-SCNC: 139 MMOL/L — SIGNIFICANT CHANGE UP (ref 135–145)

## 2022-07-09 PROCEDURE — 99231 SBSQ HOSP IP/OBS SF/LOW 25: CPT | Mod: GC

## 2022-07-09 PROCEDURE — 74183 MRI ABD W/O CNTR FLWD CNTR: CPT | Mod: 26

## 2022-07-09 PROCEDURE — 99232 SBSQ HOSP IP/OBS MODERATE 35: CPT | Mod: GC

## 2022-07-09 RX ORDER — SENNA PLUS 8.6 MG/1
2 TABLET ORAL
Qty: 0 | Refills: 0 | DISCHARGE
Start: 2022-07-09

## 2022-07-09 RX ORDER — ATENOLOL 25 MG/1
1 TABLET ORAL
Qty: 0 | Refills: 0 | DISCHARGE

## 2022-07-09 RX ORDER — POLYETHYLENE GLYCOL 3350 17 G/17G
17 POWDER, FOR SOLUTION ORAL
Qty: 0 | Refills: 0 | DISCHARGE
Start: 2022-07-09

## 2022-07-09 RX ADMIN — POLYETHYLENE GLYCOL 3350 17 GRAM(S): 17 POWDER, FOR SOLUTION ORAL at 05:17

## 2022-07-09 RX ADMIN — LISINOPRIL 20 MILLIGRAM(S): 2.5 TABLET ORAL at 05:17

## 2022-07-09 RX ADMIN — PANTOPRAZOLE SODIUM 40 MILLIGRAM(S): 20 TABLET, DELAYED RELEASE ORAL at 05:16

## 2022-07-09 RX ADMIN — SENNA PLUS 2 TABLET(S): 8.6 TABLET ORAL at 21:03

## 2022-07-09 RX ADMIN — POLYETHYLENE GLYCOL 3350 17 GRAM(S): 17 POWDER, FOR SOLUTION ORAL at 17:27

## 2022-07-09 RX ADMIN — Medication 3 MILLIGRAM(S): at 21:03

## 2022-07-09 RX ADMIN — Medication 5 MILLIGRAM(S): at 21:03

## 2022-07-09 RX ADMIN — AMLODIPINE BESYLATE 10 MILLIGRAM(S): 2.5 TABLET ORAL at 05:16

## 2022-07-09 NOTE — PROGRESS NOTE ADULT - SUBJECTIVE AND OBJECTIVE BOX
Progress Note    07-03-22 (6d)    Patient is a 86y old  Female who presents with a chief complaint of large bowel obstruction with fecalith (08 Jul 2022 17:06)      Subjective / Overnight Events :  - No acute events overnight.  Patient did not have a BM this morning  - Pt seen and examined at bedside.  Feeling fine, no abd pain, no n/v.  Does not want enema today.    Additional ROS (if any):    MEDICATIONS  (STANDING):  amLODIPine   Tablet 10 milliGRAM(s) Oral daily  bisacodyl 5 milliGRAM(s) Oral at bedtime  enoxaparin Injectable 40 milliGRAM(s) SubCutaneous every 24 hours  lactated ringers. 1000 milliLiter(s) (75 mL/Hr) IV Continuous <Continuous>  lisinopril 20 milliGRAM(s) Oral daily  pantoprazole    Tablet 40 milliGRAM(s) Oral before breakfast  polyethylene glycol 3350 17 Gram(s) Oral two times a day  senna 2 Tablet(s) Oral at bedtime    MEDICATIONS  (PRN):  aluminum hydroxide/magnesium hydroxide/simethicone Suspension 30 milliLiter(s) Oral every 4 hours PRN Dyspepsia  melatonin 3 milliGRAM(s) Oral at bedtime PRN Insomnia  ondansetron Injectable 4 milliGRAM(s) IV Push every 8 hours PRN Nausea and/or Vomiting      CAPILLARY BLOOD GLUCOSE        I&O's Summary      PHYSICAL EXAM:  Vital Signs Last 24 Hrs  T(C): 36.6 (09 Jul 2022 05:13), Max: 36.8 (08 Jul 2022 21:04)  T(F): 97.8 (09 Jul 2022 05:13), Max: 98.2 (08 Jul 2022 21:04)  HR: 61 (09 Jul 2022 05:13) (57 - 66)  BP: 144/60 (09 Jul 2022 05:13) (118/60 - 153/85)  BP(mean): --  RR: 17 (09 Jul 2022 05:13) (14 - 18)  SpO2: 100% (09 Jul 2022 05:13) (95% - 100%)    Parameters below as of 09 Jul 2022 05:13  Patient On (Oxygen Delivery Method): room air        General: NAD, non-toxic appearing   HEENT: PERRLA, EOMi, no scleral icterus  CV: RRR, normal S1 and S2, no m/r/g  Lungs: normal respiratory effort. CTAB, no wheezes, rales, or rhonchi  Abd: soft, nontender, nondistended  Ext: no edema, 2+ peripheral pulses   Pysch: AAOx3, appropriate affect   Neuro: grossly non-focal, moving all extremities spontaneously   Skin: no rashes or lesions     LABS:                          11.4   4.68  )-----------( 336      ( 08 Jul 2022 06:14 )             35.4       WBC Trend: 4.68<--, 3.50<--, 4.12<--  Hb Trend: 11.4<--, 11.4<--, 10.9<--, 11.8<--, 12.1<--    07-08    138  |  102  |  15  ----------------------------<  85  4.0   |  24  |  1.60<H>    Ca    10.1      08 Jul 2022 06:14  Phos  3.6     07-08  Mg     2.20     07-08      PT/INR - ( 08 Jul 2022 06:14 )   PT: 12.9 sec;   INR: 1.11 ratio         PTT - ( 08 Jul 2022 06:14 )  PTT:34.3 sec              RADIOLOGY & ADDITIONAL TESTS: Reviewed Progress Note    07-03-22 (6d)    Patient is a 86y old  Female who presents with a chief complaint of large bowel obstruction with fecalith (08 Jul 2022 17:06)      Subjective / Overnight Events :  - No acute events overnight.  Patient did not have a BM this morning  - Pt seen and examined at bedside.  Feeling fine, no abd pain, no n/v.  Does not want enema today.  MRCP today    Additional ROS (if any):    MEDICATIONS  (STANDING):  amLODIPine   Tablet 10 milliGRAM(s) Oral daily  bisacodyl 5 milliGRAM(s) Oral at bedtime  enoxaparin Injectable 40 milliGRAM(s) SubCutaneous every 24 hours  lactated ringers. 1000 milliLiter(s) (75 mL/Hr) IV Continuous <Continuous>  lisinopril 20 milliGRAM(s) Oral daily  pantoprazole    Tablet 40 milliGRAM(s) Oral before breakfast  polyethylene glycol 3350 17 Gram(s) Oral two times a day  senna 2 Tablet(s) Oral at bedtime    MEDICATIONS  (PRN):  aluminum hydroxide/magnesium hydroxide/simethicone Suspension 30 milliLiter(s) Oral every 4 hours PRN Dyspepsia  melatonin 3 milliGRAM(s) Oral at bedtime PRN Insomnia  ondansetron Injectable 4 milliGRAM(s) IV Push every 8 hours PRN Nausea and/or Vomiting      CAPILLARY BLOOD GLUCOSE        I&O's Summary      PHYSICAL EXAM:  Vital Signs Last 24 Hrs  T(C): 36.6 (09 Jul 2022 05:13), Max: 36.8 (08 Jul 2022 21:04)  T(F): 97.8 (09 Jul 2022 05:13), Max: 98.2 (08 Jul 2022 21:04)  HR: 61 (09 Jul 2022 05:13) (57 - 66)  BP: 144/60 (09 Jul 2022 05:13) (118/60 - 153/85)  BP(mean): --  RR: 17 (09 Jul 2022 05:13) (14 - 18)  SpO2: 100% (09 Jul 2022 05:13) (95% - 100%)    Parameters below as of 09 Jul 2022 05:13  Patient On (Oxygen Delivery Method): room air        General: NAD, non-toxic appearing   HEENT: PERRLA, EOMi, no scleral icterus  CV: RRR, normal S1 and S2, no m/r/g  Lungs: normal respiratory effort. CTAB, no wheezes, rales, or rhonchi  Abd: soft, nontender, nondistended  Ext: no edema, 2+ peripheral pulses   Pysch: AAOx3, appropriate affect   Neuro: grossly non-focal, moving all extremities spontaneously   Skin: no rashes or lesions     LABS:                          11.4   4.68  )-----------( 336      ( 08 Jul 2022 06:14 )             35.4       WBC Trend: 4.68<--, 3.50<--, 4.12<--  Hb Trend: 11.4<--, 11.4<--, 10.9<--, 11.8<--, 12.1<--    07-08    138  |  102  |  15  ----------------------------<  85  4.0   |  24  |  1.60<H>    Ca    10.1      08 Jul 2022 06:14  Phos  3.6     07-08  Mg     2.20     07-08      PT/INR - ( 08 Jul 2022 06:14 )   PT: 12.9 sec;   INR: 1.11 ratio         PTT - ( 08 Jul 2022 06:14 )  PTT:34.3 sec    RADIOLOGY & ADDITIONAL TESTS: Reviewed    7/6/2022 CTAP  IMPRESSION:  Redemonstration of colitis involving descending and sigmoid colon. No   colonic pneumatosis. Persistent luminal narrowing in the mid sigmoid   colon. Underlying benign or malignant stricture cannot be excluded.    --- End of Report ---    MONSE CASSIDY MD; Attending Radiologist  This document has been electronically signed. Jul 6 2022 12:53PM    7/8 Flexible sigmoidoscopy    Findings:       The perianal and digital rectal examinations were normal.       A benign-appearing, intrinsic severe stenosis at 18cm from anal verge,        measuring of unknown length was found in the sigmoid colon and was        non-traversed using the gastroscope (9.2mm outer diameter). Biopsies        were taken with a cold forceps for histology.       The exam was otherwise normal throughout the examined colon.                                                                                   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 Suspect benign                        etiology (possibly diverticular). Biopsied.  Recommendation:      - [facility] patient to [facility] for [facility].                       - Await pathology results.                       - Consult colorectal surgery

## 2022-07-09 NOTE — PROGRESS NOTE ADULT - PROBLEM SELECTOR PLAN 1
CTAP: Colonic stricture  - advancing diet as tolerated  - give enemas and miralax bid  - MRCP on schedule  - GI, surgery following, appreciate recs  - per GI, flexible sigmoidoscopy  - 2 tap water enemas daily for stool passage  - tumor markers wnl CTAP: Colonic stricture  - advancing diet as tolerated  - give miralax bid  - MRCP on schedule  - GI, surgery following, appreciate recs  - per GI, flexible sigmoidoscopy showed 1cm stricture, biopsy taken  - tumor markers wnl

## 2022-07-09 NOTE — PROGRESS NOTE ADULT - ASSESSMENT
Patient is a 87 y/o Creole-speaking F with hx of colon ca s/p resection 20 yrs ago, s/p cholecystectomy, HTN, and GERD presenting with constipation 2/2 LBO.  7/3 CTAP with possible pneumatosis with ischemic colitis.  Patient with benign abdominal exam, normal lactate, afebrile, at baseline mental status and not meeting sepsis criteria.  Due to benign clinical presentation and lab work, performed CTAP after BM, redemonstration of colitis, with potential benign or malignant stricture.  Patient planned for flexible sigmoidoscopy. Patient is a 87 y/o Creole-speaking F with hx of colon ca s/p resection 20 yrs ago, s/p cholecystectomy, HTN, and GERD presenting with constipation 2/2 LBO.  7/3 CTAP with possible pneumatosis with ischemic colitis.  Patient with benign abdominal exam, normal lactate, afebrile, at baseline mental status and not meeting sepsis criteria.  Due to benign clinical presentation and lab work, performed CTAP after BM, redemonstration of colitis, with potential benign or malignant stricture.  Flexible sigmoidoscopy showed 1cm stricture, biopsy taken.  MRCP performed

## 2022-07-09 NOTE — PROGRESS NOTE ADULT - SUBJECTIVE AND OBJECTIVE BOX
Chief Complaint:  Patient is a 86y old  Female who presents with a chief complaint of large bowel obstruction with fecalith (09 Jul 2022 09:58)      Interval Events: no acute events overnight  - s/p flex sig yesterday with severe stenosis at 18cm s/p biopsy  - seen ambulating to bathroom this AM, denies abd pain, n/v      Hospital Medications:  aluminum hydroxide/magnesium hydroxide/simethicone Suspension 30 milliLiter(s) Oral every 4 hours PRN  amLODIPine   Tablet 10 milliGRAM(s) Oral daily  bisacodyl 5 milliGRAM(s) Oral at bedtime  enoxaparin Injectable 40 milliGRAM(s) SubCutaneous every 24 hours  lactated ringers. 1000 milliLiter(s) IV Continuous <Continuous>  lisinopril 20 milliGRAM(s) Oral daily  melatonin 3 milliGRAM(s) Oral at bedtime PRN  ondansetron Injectable 4 milliGRAM(s) IV Push every 8 hours PRN  pantoprazole    Tablet 40 milliGRAM(s) Oral before breakfast  polyethylene glycol 3350 17 Gram(s) Oral two times a day  senna 2 Tablet(s) Oral at bedtime      PMHX/PSHX:  HTN (hypertension)    Colon cancer    GERD (gastroesophageal reflux disease)    S/P colon resection    History of cholecystectomy            ROS:     General:  No weight loss, fevers, chills, night sweats, fatigue   Eyes:  No vision changes  ENT:  No sore throat, pain, runny nose  CV:  No chest pain, palpitations, dizziness   Resp:  No SOB, cough, wheezing  GI:  See HPI  :  No burning with urination, hematuria  Muscle:  No pain, weakness  Neuro:  No weakness/tingling, memory problems  Psych:  No fatigue, insomnia, mood problems, depression  Heme:  No easy bruisability  Skin:  No rash, edema      PHYSICAL EXAM:     GENERAL:  Well developed, no distress  HEENT:  NC/AT,  conjunctivae clear, sclera anicteric  CHEST:  Full & symmetric excursion, no increased effort w/ respirations  HEART:  Regular rhythm & rate  ABDOMEN:  Soft, non-tender, non-distended  EXTREMITIES:  no LE  edema  SKIN:  No rash/erythema/ecchymoses/petechiae/wounds/jaundice  NEURO:  Alert, oriented    Vital Signs:  Vital Signs Last 24 Hrs  T(C): 36.6 (09 Jul 2022 05:13), Max: 36.8 (08 Jul 2022 21:04)  T(F): 97.8 (09 Jul 2022 05:13), Max: 98.2 (08 Jul 2022 21:04)  HR: 61 (09 Jul 2022 05:13) (57 - 66)  BP: 144/60 (09 Jul 2022 05:13) (118/60 - 153/85)  BP(mean): --  RR: 17 (09 Jul 2022 05:13) (14 - 18)  SpO2: 100% (09 Jul 2022 05:13) (95% - 100%)    Parameters below as of 09 Jul 2022 05:13  Patient On (Oxygen Delivery Method): room air      Daily Height in cm: 165.1 (08 Jul 2022 14:21)    Daily     LABS:                        11.4   4.68  )-----------( 336      ( 08 Jul 2022 06:14 )             35.4     07-08    138  |  102  |  15  ----------------------------<  85  4.0   |  24  |  1.60<H>    Ca    10.1      08 Jul 2022 06:14  Phos  3.6     07-08  Mg     2.20     07-08        PT/INR - ( 08 Jul 2022 06:14 )   PT: 12.9 sec;   INR: 1.11 ratio         PTT - ( 08 Jul 2022 06:14 )  PTT:34.3 sec        Imaging:    < from: Colonoscopy (07.08.22 @ 14:35) >  Findings:       The perianal and digital rectal examinations were normal.       A benign-appearing, intrinsic severe stenosis at 18cm from anal verge,        measuring of unknown length was found in the sigmoid colon and was        non-traversed using the gastroscope (9.2mm outerdiameter). Biopsies        were taken with a cold forceps for histology.       The exam was otherwise normal throughout the examined colon.                                                                                   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 Suspect benign                        etiology (possibly diverticular). Biopsied.    < end of copied text >

## 2022-07-09 NOTE — PROGRESS NOTE ADULT - PROBLEM SELECTOR PLAN 2
Cr from 1.03 to 1.6  - likely 2/2 poor PO intake and JJ  - 1L IVF  - f/u Jenna and UCr  - if Cr uptrends, switch to hepQ for DVT ppx

## 2022-07-09 NOTE — PROGRESS NOTE ADULT - ASSESSMENT
JORGE EDWARDS is a 86y F, Creole speaking, with h/o colon cancer s/p partial colectomy with ileocolic anastomosis (~20 yrs PTA, no chemoRT), SBO 2017 s/p medical management, HTN, prior CCY, and GERD who initially presented to the ED with complaints of worsening constipation x 2 weeks, abdominal pain and bloating, noted to have a LBO on CT for which GI was consulted. No evidence of LBO on repeat CT on 7/3 but possible pneumatosis coli vs stercoral colitis.     # ?LBO  - prior CT reported fecalith LBO, however, CT AP 7/3 said no findings of LBO and reporting L sided colon wall thickening with narrowing concerning for stricture and possible pneumatosis vs stercoral colitis. Abdominal XR 7/5 without evidence of obstruction.   # h/o colon cancer s/p resection  # biliary ductal dilatation with liver chemistries wnl - likely 2/2 postchole, MRCP ordered  - Initial CTAP 7/3 showed nondistended small bowel loops, R hemicolectomy. Mild distention of hepatic flexure measuring up to 6.6 cm. Remainder of the colon is not distended. Air-fluid level within the colon suggest diarrhea illness. Wall thickening of the splenic flexure, descending and sigmoid colon greatest involving the mid sigmoid colon where there is luminal narrowing and surrounding inflammation. Possible mild pneumatosis coli involving splenic flexure versus air trapped between colon wall and stool. And central intrahepatic and extrahepatic biliary dilation. Common bile duct maximally measures 1.1 cm however it appears to taper distally. Possible hypodensity suggested within the distal CBD suggestive of choledocholithiasis.  - Repeat Ct A/P 7/6 - no bowel obstruction, persistent luminal narrowing in the midsigmoid colon, decreased colonic distention. No evidence of pneumatosis, increased colonic stool burden throughout the colon    Recommendations:  - f/u path from flex sig  - f/u surgery recs  - MRCP for wei dil  - Rest of care per primary    Note not finalized until signed by attending.    Dipti Posada PGY-6  Gastroenterology/Hepatology Fellow  Pager #[phone] ([facility]) or [phone] (NS)  Available on [employer] Teams.  Please contact on-call GI fellow via answering service ([phone]) after 5pm and before 8am, and on weekends.

## 2022-07-10 ENCOUNTER — TRANSCRIPTION ENCOUNTER (OUTPATIENT)
Age: 87
End: 2022-07-10

## 2022-07-10 VITALS
RESPIRATION RATE: 18 BRPM | OXYGEN SATURATION: 100 % | HEART RATE: 63 BPM | DIASTOLIC BLOOD PRESSURE: 60 MMHG | TEMPERATURE: 98 F | SYSTOLIC BLOOD PRESSURE: 109 MMHG

## 2022-07-10 DIAGNOSIS — K59.00 CONSTIPATION, UNSPECIFIED: ICD-10-CM

## 2022-07-10 LAB
ANION GAP SERPL CALC-SCNC: 12 MMOL/L — SIGNIFICANT CHANGE UP (ref 7–14)
BUN SERPL-MCNC: 15 MG/DL — SIGNIFICANT CHANGE UP (ref 7–23)
CALCIUM SERPL-MCNC: 10.3 MG/DL — SIGNIFICANT CHANGE UP (ref 8.4–10.5)
CHLORIDE SERPL-SCNC: 103 MMOL/L — SIGNIFICANT CHANGE UP (ref 98–107)
CO2 SERPL-SCNC: 23 MMOL/L — SIGNIFICANT CHANGE UP (ref 22–31)
CREAT SERPL-MCNC: 1.21 MG/DL — SIGNIFICANT CHANGE UP (ref 0.5–1.3)
EGFR: 44 ML/MIN/1.73M2 — LOW
GLUCOSE SERPL-MCNC: 89 MG/DL — SIGNIFICANT CHANGE UP (ref 70–99)
MAGNESIUM SERPL-MCNC: 2.1 MG/DL — SIGNIFICANT CHANGE UP (ref 1.6–2.6)
PHOSPHATE SERPL-MCNC: 3.1 MG/DL — SIGNIFICANT CHANGE UP (ref 2.5–4.5)
POTASSIUM SERPL-MCNC: 4.2 MMOL/L — SIGNIFICANT CHANGE UP (ref 3.5–5.3)
POTASSIUM SERPL-SCNC: 4.2 MMOL/L — SIGNIFICANT CHANGE UP (ref 3.5–5.3)
SODIUM SERPL-SCNC: 138 MMOL/L — SIGNIFICANT CHANGE UP (ref 135–145)

## 2022-07-10 PROCEDURE — 99239 HOSP IP/OBS DSCHRG MGMT >30: CPT | Mod: GC

## 2022-07-10 RX ORDER — SENNA PLUS 8.6 MG/1
2 TABLET ORAL
Qty: 60 | Refills: 0
Start: 2022-07-10 | End: 2022-08-08

## 2022-07-10 RX ORDER — POLYETHYLENE GLYCOL 3350 17 G/17G
17 POWDER, FOR SOLUTION ORAL
Qty: 1020 | Refills: 0
Start: 2022-07-10 | End: 2022-08-08

## 2022-07-10 RX ADMIN — POLYETHYLENE GLYCOL 3350 17 GRAM(S): 17 POWDER, FOR SOLUTION ORAL at 05:23

## 2022-07-10 RX ADMIN — PANTOPRAZOLE SODIUM 40 MILLIGRAM(S): 20 TABLET, DELAYED RELEASE ORAL at 05:22

## 2022-07-10 RX ADMIN — AMLODIPINE BESYLATE 10 MILLIGRAM(S): 2.5 TABLET ORAL at 05:22

## 2022-07-10 RX ADMIN — LISINOPRIL 20 MILLIGRAM(S): 2.5 TABLET ORAL at 05:22

## 2022-07-10 NOTE — PROGRESS NOTE ADULT - SUBJECTIVE AND OBJECTIVE BOX
Progress Note    07-03-22 (7d)    Patient is a 86y old  Female who presents with a chief complaint of large bowel obstruction with fecalith (10 Jul 2022 07:13)      Subjective / Overnight Events :  - No acute events overnight.  - Pt seen and examined at bedside this AM. Pt without complaints, no pain. Had BM yesterday     Additional ROS (if any):    MEDICATIONS  (STANDING):  amLODIPine   Tablet 10 milliGRAM(s) Oral daily  bisacodyl 5 milliGRAM(s) Oral at bedtime  enoxaparin Injectable 40 milliGRAM(s) SubCutaneous every 24 hours  lisinopril 20 milliGRAM(s) Oral daily  pantoprazole    Tablet 40 milliGRAM(s) Oral before breakfast  polyethylene glycol 3350 17 Gram(s) Oral two times a day  senna 2 Tablet(s) Oral at bedtime    MEDICATIONS  (PRN):  aluminum hydroxide/magnesium hydroxide/simethicone Suspension 30 milliLiter(s) Oral every 4 hours PRN Dyspepsia  melatonin 3 milliGRAM(s) Oral at bedtime PRN Insomnia  ondansetron Injectable 4 milliGRAM(s) IV Push every 8 hours PRN Nausea and/or Vomiting      CAPILLARY BLOOD GLUCOSE        I&O's Summary    09 Jul 2022 07:01  -  10 Jul 2022 07:00  --------------------------------------------------------  IN: 0 mL / OUT: 2 mL / NET: -2 mL        PHYSICAL EXAM:  Vital Signs Last 24 Hrs  T(C): 36.3 (10 Jul 2022 05:18), Max: 36.8 (09 Jul 2022 21:47)  T(F): 97.3 (10 Jul 2022 05:18), Max: 98.2 (09 Jul 2022 21:47)  HR: 58 (10 Jul 2022 05:18) (58 - 68)  BP: 160/68 (10 Jul 2022 05:18) (136/58 - 160/68)  BP(mean): --  RR: 18 (10 Jul 2022 05:18) (17 - 18)  SpO2: 100% (10 Jul 2022 05:18) (100% - 100%)    Parameters below as of 10 Jul 2022 05:18  Patient On (Oxygen Delivery Method): room air        General: NAD, non-toxic appearing   HEENT: EOMi, no scleral icterus  CV: RRR, normal S1 and S2  Lungs: normal respiratory effort. CTAB  Abd: soft, nontender, nondistended  Ext: no edema, 2+ peripheral pulses   Pysch: AAOx3, appropriate affect   Neuro: grossly non-focal, moving all extremities spontaneously       LABS:      WBC Trend: 4.68<--, 3.50<--, 4.12<--  Hb Trend: 11.4<--, 11.4<--, 10.9<--, 11.8<--    07-10    138  |  103  |  15  ----------------------------<  89  4.2   |  23  |  1.21    Ca    10.3      10 Jul 2022 08:04  Phos  3.1     07-10  Mg     2.10     07-10                RADIOLOGY & ADDITIONAL TESTS: Reviewed  < from: MR MRCP w/wo IV Cont (07.09.22 @ 12:19) >    ACC: [id number] EXAM:  MR MRCP WAW IC                          PROCEDURE DATE:  07/09/2022          INTERPRETATION:  CLINICAL INFORMATION: Biliary ductal dilatation on   7/6/2022 exam.    COMPARISON: CT abdomen/pelvis from 7/6/2022, 7/2/2022, 4/10/2022,   7/18/2017.    CONTRAST/COMPLICATIONS:  IV Contrast: Gadavist  7.5ml cc administered   0 cc discarded  Oral Contrast: NONE  Complications: None reported at time of study completion    PROCEDURE:  MRI of the abdomen was performed.  MRCP was performed.    FINDINGS:  LOWER CHEST: Cardiomegaly.    LIVER: Normal morphology. No significant steatosis or suspicious   enhancing lesion.  BILE DUCTS: Stable dilation of common bile duct up to 1.0 cm, likely   related to postcholecystectomy state. No choledocholithiasis. No   intrahepatic biliary dilatation.  GALLBLADDER: Cholecystectomy.  SPLEEN: Within normal limits.  PANCREAS: Within normal limits.  ADRENALS: Within normal limits.  KIDNEYS/URETERS: Bilateral renal cysts.    VISUALIZED PORTIONS:  BOWEL: Right hemicolectomy. Distal descending colonic wall thickening in   keeping with the history of recent colitis.  PERITONEUM: No ascites.  VESSELS: Within normal limits.  RETROPERITONEUM/LYMPH NODES: No lymphadenopathy.  ABDOMINAL WALL: Within normal limits.  BONES: Degenerative changes.    IMPRESSION:    Stable common bile duct dilation since 2017 exam, likely related to prior   cholecystectomy.. No choledocholithiasis.        --- End of Report ---          NATHALIE SALAZAR MD; Resident Radiology  This document has been electronically signed.  BRIDGET PAULINO MD; Attending Radiologist  This document has been electronically signed. Jul 9 2022  2:49PM    < end of copied text >

## 2022-07-10 NOTE — PROGRESS NOTE ADULT - REASON FOR ADMISSION
large bowel obstruction with fecalith

## 2022-07-10 NOTE — DISCHARGE NOTE NURSING/CASE MANAGEMENT/SOCIAL WORK - PATIENT PORTAL LINK FT
You can access the FollowMyHealth Patient Portal offered by [facility] by registering at the following website: [email or web address]. By joining [facility]’s FollowMyHealth portal, you will also be able to view your health information using other applications (apps) compatible with our system.

## 2022-07-10 NOTE — PROGRESS NOTE ADULT - PROVIDER SPECIALTY LIST ADULT
Gastroenterology
Internal Medicine
Surgery
Surgery
Gastroenterology
Surgery
Colorectal Surgery
Gastroenterology
Internal Medicine

## 2022-07-10 NOTE — PROGRESS NOTE ADULT - PROBLEM SELECTOR PROBLEM 1
Large bowel obstruction
Constipation

## 2022-07-10 NOTE — PROGRESS NOTE ADULT - ASSESSMENT
Patient is a 87 y/o Creole-speaking F with hx of colon ca s/p resection 20 yrs ago, s/p cholecystectomy, HTN, and GERD presenting with constipation admitted for partial LBO. Initial CTAP concerning for ischemic colitis but was monitored off antibiotics given low clinical concern, repeat CTAP without pneumatosis coli but re-demonstrated sigmoid stricture. Now s/p flexible sigmoidoscopy showing stricture, biopsies taken, pending path. MRCP showing stable CBD dilation post cholecystectomy. Patient now stable for discharge home with regular bowel regimen if no plans for inpatient intervention by surgery.

## 2022-07-10 NOTE — PROGRESS NOTE ADULT - PROBLEM SELECTOR PLAN 3
Cr from 1.03 to 1.6  - likely 2/2 poor PO intake and JJ  - 1L IVF  - f/u Jenna and UCr  - if Cr uptrends, switch to hepQ for DVT ppx home amlodipine-benazepril, atenolol  - restarted amlodipine 10 mg., lisinopril restarted at 20mg  - hold atenolol due to low HR

## 2022-07-10 NOTE — PROGRESS NOTE ADULT - PROBLEM SELECTOR PLAN 1
p/w constipation, initially concerning for partial LBO. Found to have sigmoid stricture, s/p flex-sig with biopsies taken, pending path  - Initial CT A/P without obstruction but demonstrated possible pneumatosis coli c/f ischemic colitis, however, pt clinically stable w/ benign exam, no lactate, so was monitored off abx. Repeat CT/AP showed colitis but no pneumatosis coli with stricture at sigmoid  - s/p flex-sig 7/8, stricture appears benign, biopsies were taken  - followed by surgery, no urgent intervention indicated at this time    - f/u surg re: any plans for inpatient intervention. if not, pt can be discharged home today   - f/u path results outpatient p/w constipation, initially concerning for partial LBO. Found to have sigmoid stricture, s/p flex-sig with biopsies taken, pending path  - Initial CT A/P without obstruction but demonstrated possible pneumatosis coli c/f ischemic colitis, however, pt clinically stable w/ benign exam, no lactate, so was monitored off abx. Repeat CT/AP showed colitis but no pneumatosis coli with stricture at sigmoid  - s/p flex-sig 7/8, stricture appears benign, biopsies were taken  - followed by surgery, no urgent intervention indicated at this time    - per surg: pt can follow up outpatient with Dr. Young   - f/u path results outpatient p/w constipation, initially concerning for partial LBO. Found to have sigmoid stricture, s/p flex-sig with biopsies taken, pending path  - Initial CT A/P without obstruction but demonstrated possible pneumatosis coli c/f ischemic colitis, however, pt clinically stable w/ benign exam, no lactate, so was monitored off abx. Repeat CT/AP showed colitis but no pneumatosis coli with stricture at sigmoid  - s/p flex-sig 7/8, stricture appears benign, biopsies were taken  - followed by surgery, no urgent intervention indicated at this time    - per surg: pt can follow up outpatient with Dr. Young   - f/u path results outpatient  - c/w miralax, senna, dulcolax

## 2022-07-10 NOTE — PROGRESS NOTE ADULT - PROBLEM SELECTOR PLAN 4
home amlodipine-benazepril, atenolol  - restarted amlodipine 10 mg., lisinopril restarted at 20mg  - hold atenolol due to low HR on pantoprazole 40mg qd  -continue pantoprazole

## 2022-07-10 NOTE — PROGRESS NOTE ADULT - PROBLEM SELECTOR PLAN 5
on pantoprazole 40mg qd  -continue pantoprazole Diet: regular diet  DVT ppx: lovenox  Dispo: home today, no PT needs

## 2022-07-10 NOTE — PROGRESS NOTE ADULT - PROBLEM SELECTOR PLAN 2
CTAP: Colonic stricture  - advancing diet as tolerated  - give miralax bid  - MRCP on schedule  - GI, surgery following, appreciate recs  - per GI, flexible sigmoidoscopy showed 1cm stricture, biopsy taken  - tumor markers wnl Cr from 1.03 to 1.6  - likely 2/2 poor PO intake and JJ  - 1L IVF  - f/u Jenna and UCr  - if Cr uptrends, switch to hepQ for DVT ppx

## 2022-07-10 NOTE — PROGRESS NOTE ADULT - PROBLEM SELECTOR PLAN 6
Diet: regular diet  DVT ppx: lovenox Diet: regular diet  DVT ppx: lovenox  Dispo: home today, no PT needs

## 2022-07-10 NOTE — PROGRESS NOTE ADULT - ATTENDING COMMENTS
25 minutes spent on total encounter; more than 50% of the visit was spent counseling and / or coordinating care by the attending physician.  The necessity of the time spent during the encounter on this date of service was due to:     clinical eval, review labs, discuss with patient/ team.    JORGE EDWARDS is a 86y F, Creole speaking, with h/o colon cancer s/p partial colectomy with ileocolic anastomosis (~20 yrs PTA, no chemoRT), SBO 2017 s/p medical management, HTN, prior CCY, and GERD who initially presented to the ED with complaints of worsening constipation x 2 weeks, abdominal pain and bloating, noted to have a LBO on CT for which GI was consulted. No evidence of LBO on repeat CT on 7/3 but possible pneumatosis coli vs stercoral colitis.     # ?LBO  - prior CT reported fecalith LBO, however, CT AP 7/3 said no findings of LBO and reporting L sided colon wall thickening with narrowing concerning for stricture and possible pneumatosis vs stercoral colitis. Abdominal XR 7/5 without evidence of obstruction.   # h/o colon cancer s/p resection  # biliary ductal dilatation with liver chemistries wnl - post CCY changes? MRCP ordered  - Initial CTAP 7/3 showed nondistended small bowel loops, R hemicolectomy. Mild distention of hepatic flexure measuring up to 6.6 cm. Remainder of the colon is not distended. Air-fluid level within the colon suggest diarrhea illness. Wall thickening of the splenic flexure, descending and sigmoid colon greatest involving the mid sigmoid colon where there is luminal narrowing and surrounding inflammation. Possible mild pneumatosis coli involving splenic flexure versus air trapped between colon wall and stool. And central intrahepatic and extrahepatic biliary dilation. Common bile duct maximally measures 1.1 cm however it appears to taper distally. Possible hypodensity suggested within the distal CBD suggestive of choledocholithiasis.  - Repeat Ct A/P 7/6 - no bowel obstruction, persistent luminal narrowing in the midsigmoid colon, decreased colonic distention. No evidence of pneumatosis, increased colonic stool burden throughout the colon    Recommendations:  #Luminal narrowing of midsigmoid colon. No pneumatosis coli seen on repeat CT 7/6  - will perform flexible sigmoidoscopy on patient today, please give two tap water enemas this am  - Surgery following  - Closely monitor for worsening sx or decline in condition  - Serial abdominal exams with Xray  - Daily CBC, BMP, coags  - Ensure adequate hydration  - Antiemetics as needed  - Pain control, avoid NSAIDs and opioids
25 minutes spent on total encounter; more than 50% of the visit was spent counseling and / or coordinating care by the attending physician.  The necessity of the time spent during the encounter on this date of service was due to:     clinical eval, review labs, discuss with patient/ team.    JORGE EDWARDS is a 86y F, Creole speaking, with h/o colon cancer s/p partial colectomy with ileocolic anastomosis (~20 yrs PTA, no chemoRT), SBO 2017 s/p medical management, HTN, prior CCY, and GERD who initially presented to the ED with complaints of worsening constipation x 2 weeks, abdominal pain and bloating, noted to have a LBO on CT for which GI was consulted. No evidence of LBO on repeat CT on 7/3 but possible pneumatosis coli vs stercoral colitis.   # ?LBO  - prior CT reported fecalith LBO, however, CT AP 7/3 said no findings of LBO and reporting L sided colon wall thickening with narrowing concerning for stricture and possible pneumatosis vs stercoral colitis. Abdominal XR 7/5 without evidence of obstruction.  # h/o colon cancer s/p resection  # biliary ductal dilatation with liver chemistries wnl - post CCY changes?    Recommendations:  - We will proceed with a colonoscopy on Friday given patient has a stricture  - CT A/P w/ IVC showed no pneumatosis coli
Agree w above. 87 yo female w hx of colon CA s/p partial colectomy over 20 yrs ago s/p flex sig with severe stenosis at 18 cm. Awaiting sigmoid biopsies. Patient without complaints of abdominal pain and ambulating.
25 minutes spent on total encounter; more than 50% of the visit was spent counseling and / or coordinating care by the attending physician.  The necessity of the time spent during the encounter on this date of service was due to:     clinical eval, review labs, discuss with patient/ team.    JORGE EDWARDS is a 86y F, Creole speaking, with h/o colon cancer s/p partial colectomy with ileocolic anastomosis (~20 yrs PTA, no chemoRT), SBO 2017 s/p medical management, HTN, prior CCY, and GERD who initially presented to the ED with complaints of worsening constipation x 2 weeks, abdominal pain and bloating, noted to have a LBO on CT for which GI was consulted. No evidence of LBO on repeat CT on 7/3 but possible pneumatosis coli vs stercoral colitis.   # ?LBO  - prior CT reported fecalith LBO, however, CT AP 7/3 said no findings of LBO and reporting L sided colon wall thickening with narrowing concerning for stricture and possible pneumatosis vs stercoral colitis. Abdominal XR 7/5 without evidence of obstruction.   # h/o colon cancer s/p resection  - Repeat Ct A/P 7/6 - no bowel obstruction, persistent luminal narrowing in the midsigmoid colon, decreased colonic distention. No evidence of pneumatosis, increased colonic stool burden throughout the colon    Recommendations:  #Luminal narrowing of midsigmoid colon. No pneumatosis coli seen on repeat CT 7/6  - plan for colonoscopy to evaluate for possible stricture. Likely early next week given persistent stool burden   - recommend 2 tap water enemas today
86y F, hx of colon cancer s/p resection, hx of SBO, admitted now w/ constipation, abd pain, bloating x 2 wks.   GI consulted yesterday as prelim ct reading suggested LBO 2/2 sigmoid colon fecalith.   Final read today of CT abd/pelvis --> ?choledocholithiasis, ?mild pneumatosis coli, colonic wall thickening with luminal narrowing and inflammation.   Patient reports having large bowel movement after enemas yesterday. States she has mild abd discomfort today.     Continue enemas   Consider repeat imaging for further evaluation of colonic findings   Defer to surgery re: possible pneumatosis coli although clinically does not have typical presenation  Depending on clinical course, will consider inpatient flex sig vs colonoscopy for further evaluation   MRCP for abnormality in biliary tree seen on ct scan  Trend cbc, cmp, lactate   serial abdominal exams   rest of care per primary  GI to follow, please all with questions
55 minutes spent on total encounter; more than 50% of the visit was spent counseling and / or coordinating care by the attending physician.  The necessity of the time spent during the encounter on this date of service was due to:     clinical eval, review labs, discuss with patient/ team.    JORGE EDWARDS is a 86y F, Creole speaking, with h/o colon cancer s/p partial colectomy with ileocolic anastomosis (~20 yrs PTA, no chemoRT), SBO 2017 s/p medical management, HTN, prior CCY, and GERD who initially presented to the ED with complaints of worsening constipation x 2 weeks, abdominal pain and bloating, noted to have a LBO on CT for which GI was consulted.      # ?LBO  - prior CT reported fecalith LBO, however, today the CT read said no findings of LBO and reporting L sided colon wall thickening with narrowing concerning for stricture and possible pneumatosis vs stercoral colitis  # h/o colon cancer s/p resection  # biliary ductal dilatation with liver chemistries wnl - post CCY changes?  - CTAP: Nondistended small bowel loops. Right hemicolectomy. Mild distention of hepatic flexure measuring up to 6.6 cm. Remainder of the colon is not distended. Air-fluid level within the colon suggest diarrhea illness. Wall thickening of the splenic flexure, descending and sigmoid colon greatest involving the mid sigmoid colon where there is luminal narrowing and surrounding inflammation. Possible mild pneumatosis coli involving splenic flexure versus air trapped between colon wall and stool. And  central intrahepatic and extrahepatic biliary dilation. Common bile duct maximally measures 1.1 cm however it appears to taper distally. Possible hypodensity suggested within the distal CBD suggestive of choledocholithiasis.    Recommendations:  - Unclear whether patient has a stricture and may benefit from colonoscopy for further evaluation at some point if there is not pneumatosis coli  - Please give multiple enemas to allow for BMs, and then repeat imaging r/o pneumatosis coli  - If negative for pneumatosis coli, will consider colonoscopy, otherwise would obtain surgical input  - Surgery following  - Closely monitor for worsening sx or decline in condition  - Serial abdominal exams with Xray  - Daily CBC, BMP, coags  - Check MRCP
86 year old [place] Creole-speaking female with a history of colon cancer s/p resection 20 yrs ago, s/p cholecystectomy, HTN, and GERD presenting with constipation, possible colitis.    patient seen and examined at bedside. patient's daughter and son in law at bedside, able to translate. patient feeling okay, only has abdominal pain when someone presses on her RUQ. denies nausea. no bowel movement so far today.    #Constipation, colitis  -CTAP with findings of "Nonspecific colitis from splenic flexure through sigmoid colon most severe at the sigmoid colon where there could be an   inflammatory stricture. Possible pneumatosis at splenic flexure raises concern for ischemic colitis"  -patient with 2 BMs yesterday, will give enema today, goal at least 1 BM per day  -repeat CTAP with IV contrast still showing colitis involving descending and sigmoid colon. No colonic pneumatosis. Persistent luminal narrowing in the mid sigmoid colon  -c/w Miralax BID, daily enemas as needed for daily bowel movements  -GI and surgery following  -patient agreeable for colonoscopy if necessary    #R/o choledocholithiasis  -CTAP with findings of "Common bile duct maximally measures 1.1 cm however it appears to taper distally. Possible hypodensity suggested within the distal CBD suggestive of choledocholithiasis"  -given RUQ pain on exam, will obtain MRCP    #Bradycardia  -chronic  -EKG reviewed with sinus bradycardia  -patient asymptomatic at this time  -needs outpatient cardiology follow up, will consider inpatient if patient develops symptoms or HR persistently <50  -hold home atenolol    #HTN  -c/w amlodipine    d/w HS 1
86 year old Citizen of [place] Creole-speaking female with a history of colon cancer s/p resection 20 yrs ago, s/p cholecystectomy, HTN, and GERD presenting with constipation, possible colitis.    patient seen and examined at bedside. patient's daughter and son in law at bedside, able to translate. patient c/o diffuse abd pain today, did not have BM yesterday. feels abdomen is more distended. tolerating clear liquid diet.    #Constipation, colitis  -CTAP with findings of "Nonspecific colitis from splenic flexure through sigmoid colon most severe at the sigmoid colon where there could be an   inflammatory stricture. Possible pneumatosis at splenic flexure raises concern for ischemic colitis"  -2 tap water enemas, mag citrate ordered  -repeat CTAP with IV contrast still showing colitis involving descending and sigmoid colon. No colonic pneumatosis. Persistent luminal narrowing in the mid sigmoid colon  -c/w Miralax BID, daily enemas as needed for daily bowel movements  -GI and surgery following  -initial plan for colonoscopy tomorrow however patient with significant stool burden, may be pushed back to early next week    #R/o choledocholithiasis  -CTAP with findings of "Common bile duct maximally measures 1.1 cm however it appears to taper distally. Possible hypodensity suggested within the distal CBD suggestive of choledocholithiasis"  -MRCP ordered    #Bradycardia  -chronic  -EKG reviewed with sinus bradycardia  -patient asymptomatic at this time  -needs outpatient cardiology follow up, will consider inpatient if patient develops symptoms or HR persistently <50  -hold home atenolol    #HTN  -c/w amlodipine    d/w HS 1
86 year old creole speaking female with hx colon ca s/p resection, prior cholecystectomy, HTN, GERD, presented with constipation found to have LBO 2/2 fecalith in prox sigmoid colon. Surgery and GI consulted.  [id number] used. Patient currently comfortable, mild discomfort with palpation of abdomen. Exam with soft, NT, ND abdomen. Patient reports passing gas still. Follow up additional surgery/GI recs. Remainder as above.
86 year old [place] Creole-speaking female with a history of colon cancer s/p resection 20 yrs ago, s/p cholecystectomy, HTN, and GERD presenting with constipation, possible colitis.    patient seen and examined at bedside. patient sleeping in bed.    #Constipation, colitis  -CTAP with findings of "Nonspecific colitis from splenic flexure through sigmoid colon most severe at the sigmoid colon where there could be an   inflammatory stricture. Possible pneumatosis at splenic flexure raises concern for ischemic colitis"  -repeat CTAP with IV contrast still showing colitis involving descending and sigmoid colon. No colonic pneumatosis. Persistent luminal narrowing in the mid sigmoid colon  -flex sig on 7/8 showing benign appearing severe stricture in the sigmoid colon  -unable to complete colonoscopy as gastroscope was unable to pass stricture  -f/u biopsy report  -c/w Miralax BID, daily enemas as needed for daily bowel movements  -GI and surgery following    #R/o choledocholithiasis  -CTAP with findings of "Common bile duct maximally measures 1.1 cm however it appears to taper distally. Possible hypodensity suggested within the distal CBD suggestive of choledocholithiasis"  -MRCP ordered    #Bradycardia  -chronic  -EKG reviewed with sinus bradycardia  -patient asymptomatic at this time  -needs outpatient cardiology follow up, will consider inpatient if patient develops symptoms or HR persistently <50  -hold home atenolol    #HTN  -c/w amlodipine    d/w HS 1
86 year old [place] Creole-speaking female with a history of colon cancer s/p resection 20 yrs ago, s/p cholecystectomy, HTN, and GERD presenting with constipation, possible colitis.    patient seen and examined at bedside. [employer]  #[id number] used for [place] Creole translation. patient had 2 BMs this morning per nursing staff. patient now reports resolution in her abdominal pain in the lower quadrants, but she is having some pain in her RUQ. denies nausea/vomiting. denies dysuria.    #Constipation  -CTAP with findings of "Nonspecific colitis from splenic flexure through sigmoid colon most severe at the sigmoid colon where there could be an   inflammatory stricture. Possible pneumatosis at splenic flexure raises concern for ischemic colitis"  -patient with 2 BMs today  -will obtain repeat CTAP with IV contrast to assess for area of colitis  -c/w Miralax BID, daily enemas as needed for daily bowel movements  -low suspicion for ischemic colitis with normal lactate and improvement in abdominal pain  -GI and surgery following    #R/o choledocholithiasis  -CTAP with findings of "Common bile duct maximally measures 1.1 cm however it appears to taper distally. Possible hypodensity suggested within the distal CBD suggestive of choledocholithiasis"  -given RUQ pain on exam, will obtain MRCP    #Bradycardia  -chronic  -EKG reviewed with sinus bradycardia  -patient asymptomatic  -needs outpatient cardiology follow up, will consider inpatient if patient develops symptoms or HR persistently <50  -hold home atenolol    #HTN  -c/w amlodipine    d/w HS 1
86 year old [place] Creole-speaking female with a history of colon cancer s/p resection 20 yrs ago, s/p cholecystectomy, HTN, and GERD presenting with constipation, possible colitis.    patient seen and examined at bedside. patient denies having any pain. had BM this morning. tolerating diet.    #Constipation, colitis  -CTAP with findings of "Nonspecific colitis from splenic flexure through sigmoid colon most severe at the sigmoid colon where there could be an   inflammatory stricture. Possible pneumatosis at splenic flexure raises concern for ischemic colitis"  -repeat CTAP with IV contrast still showing colitis involving descending and sigmoid colon. No colonic pneumatosis. Persistent luminal narrowing in the mid sigmoid colon  -flex sig on 7/8 showing benign appearing severe stricture in the sigmoid colon  -unable to complete colonoscopy as gastroscope was unable to pass stricture  -f/u biopsy report  -follow up with colorectal surgery outpatient  -c/w Miralax BID, daily enemas as needed for daily bowel movements  -GI and surgery following    #R/o choledocholithiasis  -CTAP with findings of "Common bile duct maximally measures 1.1 cm however it appears to taper distally. Possible hypodensity suggested within the distal CBD suggestive of choledocholithiasis"  -MRCP with findings of stable biliary ductal dilatation since 2017, likely 2/2 prior cholecystectomy    #Bradycardia  -chronic  -EKG reviewed with sinus bradycardia  -patient asymptomatic at this time  -needs outpatient cardiology follow up, will consider inpatient if patient develops symptoms or HR persistently <50  -hold home atenolol    #HTN  -c/w amlodipine    d/c home today    d/w HS 1
86 year old [place] Creole-speaking female with a history of colon cancer s/p resection 20 yrs ago, s/p cholecystectomy, HTN, and GERD presenting with constipation, possible colitis.    patient seen and examined at bedside. patient sleeping in bed. flex sig today showing benign appearing severe stricture in the sigmoid colon. biopsies taken. patient with BM this morning after 2 enemas.    #Constipation, colitis  -CTAP with findings of "Nonspecific colitis from splenic flexure through sigmoid colon most severe at the sigmoid colon where there could be an   inflammatory stricture. Possible pneumatosis at splenic flexure raises concern for ischemic colitis"  -repeat CTAP with IV contrast still showing colitis involving descending and sigmoid colon. No colonic pneumatosis. Persistent luminal narrowing in the mid sigmoid colon  -flex sig today showing benign appearing severe stricture in the sigmoid colon  -unable to complete colonoscopy as gastroscope was unable to pass stricture  -f/u biopsy report  -c/w Miralax BID, daily enemas as needed for daily bowel movements  -GI and surgery following    #R/o choledocholithiasis  -CTAP with findings of "Common bile duct maximally measures 1.1 cm however it appears to taper distally. Possible hypodensity suggested within the distal CBD suggestive of choledocholithiasis"  -MRCP ordered    #Bradycardia  -chronic  -EKG reviewed with sinus bradycardia  -patient asymptomatic at this time  -needs outpatient cardiology follow up, will consider inpatient if patient develops symptoms or HR persistently <50  -hold home atenolol    #HTN  -c/w amlodipine    d/w HS 1

## 2022-07-11 LAB — SURGICAL PATHOLOGY STUDY: SIGNIFICANT CHANGE UP

## 2022-07-12 PROBLEM — K21.9 GASTRO-ESOPHAGEAL REFLUX DISEASE WITHOUT ESOPHAGITIS: Chronic | Status: ACTIVE | Noted: 2022-07-03

## 2022-07-13 ENCOUNTER — APPOINTMENT (OUTPATIENT)
Dept: GASTROENTEROLOGY | Facility: CLINIC | Age: 87
End: 2022-07-13

## 2022-07-13 VITALS
BODY MASS INDEX: 31.8 KG/M2 | TEMPERATURE: 98.7 F | WEIGHT: 162 LBS | HEIGHT: 60 IN | OXYGEN SATURATION: 97 % | HEART RATE: 52 BPM | DIASTOLIC BLOOD PRESSURE: 80 MMHG | SYSTOLIC BLOOD PRESSURE: 120 MMHG

## 2022-07-13 DIAGNOSIS — Z85.038 PERSONAL HISTORY OF OTHER MALIGNANT NEOPLASM OF LARGE INTESTINE: ICD-10-CM

## 2022-07-13 PROCEDURE — 99204 OFFICE O/P NEW MOD 45 MIN: CPT

## 2022-07-13 RX ORDER — AMLODIPINE BESYLATE AND BENAZEPRIL HYDROCHLORIDE 10; 20 MG/1; MG/1
10-20 CAPSULE ORAL
Qty: 90 | Refills: 0 | Status: ACTIVE | COMMUNITY
Start: 2022-07-12

## 2022-07-13 RX ORDER — DOCUSATE SODIUM 100 MG/1
100 CAPSULE, LIQUID FILLED ORAL
Qty: 30 | Refills: 0 | Status: ACTIVE | COMMUNITY
Start: 2022-04-18

## 2022-07-13 NOTE — HISTORY OF PRESENT ILLNESS
[FreeTextEntry1] : This is an 86-year-old female with history of hypertension, colon cancer status post right hemicolectomy in 2004, status postcholecystectomy, presented to [facility] on July 3 with symptoms of constipation for 2 weeks.  Initially there was concern for large bowel obstruction and surgery was consulted.  Initial CT of the abdomen pelvis showed possible pneumatosis coli concerning for ischemic colitis.  However patient was hemodynamically stable with benign abdominal exam and negative lactate.  She was monitored off antibiotics given low clinical suspicion.  She was maintained on bowel regimen with senna and MiraLAX as well as Fleet enemas.  Repeat CT abdomen pelvis without evidence of ischemic colitis but did show a stricture at the sigmoid with CBD ductal dilatation.  A flexible sigmoidoscopy showed a benign strictures.  Biopsies were obtained which came back benign without evidence of malignancy.  The flexible sigmoidoscope was not able to traverse through the stricture for the CBD ductal dilatation, and MRCP was performed showing stable CBD dilation secondary to previous cholecystectomy.  Liver enzymes were within normal limits.  Her hospital course was complicated by NOMI secondary to poor p.o. intake and JJ.  This resolved with IV fluid hydration and increase p.o. intake.  She was discharged home on bowel regimen including MiraLAX and senna.  She has an appointment with colorectal surgery on Monday.  She is now here for follow-up visit as an outpatient.  She is accompanied by her daughter who is interpreting in English and Creole.  The patient relates to have mild lower abdominal pain.  She is passing gas and having bowel movements.  She had a small amount of bowel movement this morning.  However she has not been taking her MiraLAX since discharge from the hospital on Sunday.  They are aware that she needs to  the MiraLAX from the pharmacy.  She denies nausea or vomiting.  She is eating well.

## 2022-07-13 NOTE — PHYSICAL EXAM
[General Appearance - Alert] : alert [General Appearance - In No Acute Distress] : in no acute distress [Sclera] : the sclera and conjunctiva were normal [Outer Ear] : the ears and nose were normal in appearance [Auscultation Breath Sounds / Voice Sounds] : lungs were clear to auscultation bilaterally [Heart Rate And Rhythm] : heart rate was normal and rhythm regular [Heart Sounds] : normal S1 and S2 [Heart Sounds Gallop] : no gallops [Murmurs] : no murmurs [Heart Sounds Pericardial Friction Rub] : no pericardial rub [Edema] : there was no peripheral edema [Bowel Sounds] : normal bowel sounds [Abdomen Soft] : soft [Abdomen Tenderness] : non-tender [] : no hepato-splenomegaly [Abdomen Mass (___ Cm)] : no abdominal mass palpated [FreeTextEntry1] : healed abdominal scars [Skin Color & Pigmentation] : normal skin color and pigmentation [No Focal Deficits] : no focal deficits

## 2022-07-13 NOTE — REASON FOR VISIT
[Post Hospitalization] : a post hospitalization visit [FreeTextEntry1] : sigmoid stricture, constipation, hx of colon cancer

## 2022-07-18 ENCOUNTER — APPOINTMENT (OUTPATIENT)
Dept: COLORECTAL SURGERY | Facility: CLINIC | Age: 87
End: 2022-07-18

## 2022-07-18 VITALS
HEIGHT: 61.5 IN | HEART RATE: 57 BPM | WEIGHT: 168 LBS | TEMPERATURE: 97.8 F | RESPIRATION RATE: 16 BRPM | OXYGEN SATURATION: 97 % | BODY MASS INDEX: 31.31 KG/M2 | DIASTOLIC BLOOD PRESSURE: 68 MMHG | SYSTOLIC BLOOD PRESSURE: 129 MMHG

## 2022-07-18 DIAGNOSIS — Z87.891 PERSONAL HISTORY OF NICOTINE DEPENDENCE: ICD-10-CM

## 2022-07-18 DIAGNOSIS — Z78.9 OTHER SPECIFIED HEALTH STATUS: ICD-10-CM

## 2022-07-18 DIAGNOSIS — Z80.0 FAMILY HISTORY OF MALIGNANT NEOPLASM OF DIGESTIVE ORGANS: ICD-10-CM

## 2022-07-18 DIAGNOSIS — Z80.42 FAMILY HISTORY OF MALIGNANT NEOPLASM OF PROSTATE: ICD-10-CM

## 2022-07-18 DIAGNOSIS — I10 ESSENTIAL (PRIMARY) HYPERTENSION: ICD-10-CM

## 2022-07-18 PROCEDURE — 99202 OFFICE O/P NEW SF 15 MIN: CPT

## 2022-07-18 RX ORDER — ATENOLOL 50 MG/1
50 TABLET ORAL
Qty: 90 | Refills: 0 | Status: DISCONTINUED | COMMUNITY
Start: 2022-07-12 | End: 2022-07-18

## 2022-07-18 RX ORDER — UBIDECARENONE/VIT E ACET 100MG-5
CAPSULE ORAL
Refills: 0 | Status: ACTIVE | COMMUNITY

## 2022-07-18 RX ORDER — PANTOPRAZOLE 40 MG/1
40 TABLET, DELAYED RELEASE ORAL
Refills: 0 | Status: ACTIVE | COMMUNITY

## 2022-07-18 NOTE — PHYSICAL EXAM
[Normal Breath Sounds] : Normal breath sounds [Normal Heart Sounds] : normal heart sounds [Normal Rate and Rhythm] : normal rate and rhythm [Alert] : alert [Oriented to Person] : oriented to person [Oriented to Place] : oriented to place [Oriented to Time] : oriented to time [Calm] : calm [de-identified] : round soft +BS NT/ND [de-identified] : well nourished female [de-identified] : NC/AT [de-identified] : +ROM [de-identified] : intact

## 2022-07-18 NOTE — HISTORY OF PRESENT ILLNESS
[FreeTextEntry1] : 87yo F pt with hx of colon CA s/p right hemicolectomy presents for follow up of recent hospitalization at [facility] for 2 weeks of constipation.\par \par Currently c/o intermittent abd pain b/l.\par Pt has BM daily, a little bit at a time, soft formed stools. Taking colace. Notes one episode of vomiting on Saturday night.\par Denies rectal bleeding, nausea, vomiting currently.\par \par 7/3 CT AP: pneumatosis and splenic flexure, concern of ischemic colities. nonspecific colitis from splenic flexure through SC, most sever at SC.\par \par 7/6 CT AP: redemonstration of colitis. no colonic pneumatosis.\par FLS done in hospital: benign biopsy of SC, did not pass stricture.\par Pt does not recall when last full colonscopy was.\par

## 2022-07-20 ENCOUNTER — NON-APPOINTMENT (OUTPATIENT)
Age: 87
End: 2022-07-20

## 2022-09-15 ENCOUNTER — APPOINTMENT (OUTPATIENT)
Dept: GASTROENTEROLOGY | Facility: CLINIC | Age: 87
End: 2022-09-15

## 2022-09-15 VITALS
SYSTOLIC BLOOD PRESSURE: 131 MMHG | HEIGHT: 60 IN | BODY MASS INDEX: 30.82 KG/M2 | TEMPERATURE: 97.1 F | WEIGHT: 157 LBS | OXYGEN SATURATION: 98 % | DIASTOLIC BLOOD PRESSURE: 70 MMHG | HEART RATE: 65 BPM

## 2022-09-15 DIAGNOSIS — K59.00 CONSTIPATION, UNSPECIFIED: ICD-10-CM

## 2022-09-15 DIAGNOSIS — K56.699 OTHER INTESTINAL OBSTRUCTION UNSPECIFIED AS TO PARTIAL VERSUS COMPLETE OBSTRUCTION: ICD-10-CM

## 2022-09-15 PROCEDURE — 99214 OFFICE O/P EST MOD 30 MIN: CPT

## 2022-09-20 PROBLEM — K59.00 CONSTIPATION: Status: ACTIVE | Noted: 2022-09-20

## 2022-09-20 PROBLEM — K56.699 STRICTURE OF SIGMOID COLON: Status: ACTIVE | Noted: 2022-07-13

## 2022-09-20 NOTE — ASSESSMENT
[FreeTextEntry1] : Impression:\par \par #1.  Sigmoid colon stricture, benign-appearing, possibly diverticular or ischemic in etiology, with associated constipation and crampy abdominal pain and LLQ abd tenderness.  Status post flexible sigmoidoscopy, unable to traverse stricture.\par \par #2.  History of colon cancer status post right colonic colectomy in 2004\par \par Plan:\par \par #1.  Sigmoidoscopy with possible balloon dilation of stricture\par \par Risks, benefits, alternatives of the procedure were discussed with the patient and the patient was educated about the procedure. Risks include, but are not limited to, bleeding, infection, injury to internal organs, possible need for further procedures including emergency surgery, missed lesions, risk of anesthesia, and risk of IV site problems.  Patient understands and agrees to proceed.\par \par #2.  Golytely prep.\par \par #3.  Continue laxatives.

## 2022-09-20 NOTE — HISTORY OF PRESENT ILLNESS
[FreeTextEntry1] : Referred for colonic stricture (sigmoid colon)\par \par Has bowel movements with aid of laxatives.\par Crampy lower abd pain.\par No n/v.\par No abd distention.\par No fever, chills, sweats, melena, hematochezia, weight loss.\par No chest pain/dyspnea.\par \par \par

## 2022-09-20 NOTE — PHYSICAL EXAM
[Alert] : alert [Heart Rate And Rhythm] : heart rate was normal and rhythm regular [Bowel Sounds] : normal bowel sounds [No Masses] : no abdominal mass palpated [Abdomen Soft] : soft [LLQ] : in the left lower quadrant [Abnormal Walk] : normal gait [Normal Affect] : the affect was normal [FreeTextEntry1] : Anicteric sclera [de-identified] : Facemask in place [de-identified] : Supple [de-identified] : Mild tenderness left lower quadrant no rebound or guarding.  Nondistended [de-identified] : No jaundice [de-identified] : No confusion

## 2022-09-20 NOTE — CONSULT LETTER
[Dear  ___] : Dear  [unfilled], [Consult Letter:] : I had the pleasure of evaluating your patient, [unfilled]. [Please see my note below.] : Please see my note below. [Consult Closing:] : Thank you very much for allowing me to participate in the care of this patient.  If you have any questions, please do not hesitate to contact me. [Sincerely,] : Sincerely, [FreeTextEntry3] : Juan Black MD, MPH, JEREMIAH, DONNIE\par Chief of Clinical Quality in Gastroenterology, [facility]\par Associate Chief of Gastroenterology, [facility]\par Interim Director of Endoscopy, [facility]\par Director of Endoscopic Ultrasound, [facility]\par [address], [address]\par [facility], [address]\par 24 hours [phone]\par \par  [DrFede  ___] : Dr. PRECIADO [DrFede ___] : Dr. PRECIADO

## 2022-10-07 DIAGNOSIS — Z01.812 ENCOUNTER FOR PREPROCEDURAL LABORATORY EXAMINATION: ICD-10-CM

## 2022-10-20 NOTE — PRE-OP CHECKLIST - LATEX ALLERGY
PT DAILY TREATMENT NOTE     Patient Name: Chris Nichole  Date:10/20/2022  : 1944  [x]  Patient  Verified  Payor: [place] MEDICARE / Plan: [place] MEDICARE PART A & B / Product Type: Medicare /    In time:1404  Out time:1454  Total Treatment Time (min): 50  Total Timed Codes (min): 40  1:1 Treatment Time (min): 40   Visit #: 6     Treatment Area: Other fracture of upper and lower end of right fibula, subsequent encounter for closed fracture with routine healing [S89.490W]    SUBJECTIVE  Pt states her only difficulty is maintaining balance when walking. Pain Level (0-10 scale): 0    Any medication changes, allergies to medications, adverse drug reactions, diagnosis change, or new procedure performed?: [x] No    [] Yes (see summary sheet for update)    OBJECTIVE  Modality rationale: decrease edema, decrease inflammation, and decrease pain to improve the patients ability to optimally heal.    Min Type Additional Details    [] Estim: []Att   []Unatt  []TENS instruct                 []IFC  []Premod []NMES                       []Other:  []w/US   []w/ice   []w/heat  Position:  Location:    []  Traction: [] Cervical       []Lumbar                       [] Prone          []Supine                       []Intermittent   []Continuous Lbs:  [] before manual  [] after manual    []  Ultrasound: []Continuous   [] Pulsed                           []1MHz   []3MHz Location:  W/cm2:    []  Ice     []  heat  []  Ice massage Position:  Location:   10 [x]  Vasopneumatic Device Pressure: [x] lo [] med [] hi   Temp: [] lo [x] med [] hi   [] Skin assessment post-treatment:  []intact []redness- no adverse reaction       []redness - adverse reaction:     35 min Therapeutic Exercise:  [x] See flow sheet :   Rationale: increase ROM, increase strength, improve coordination, improve balance, and increase proprioception to improve the patients ability to restore achieve full AROM and strength.             With TE  TA   NR  GT   Misc Patient Education: [x] Review HEP    [] Progressed/Changed HEP based on:   [] positioning   [] body mechanics   [] transfers   [] heat/ice application        Pain Level (0-10 scale) post treatment: 2    ASSESSMENT/Changes in Function: Continued with exercises per flowsheet to achieve short term goals which are Patient will report the knowledge of 3 exercises that can be used to help reduce symptoms to be able to ind reduce symptoms while at home, to demonstrate a 1/2 grade improvement in R ankle MMT to be able to better ambulate with a normalized gait without pain, to demonstrate R ankle PROM improvement of 15 degrees in sagittal plane and 10 degrees in frontal plane to facilitate increased ability to ascend/descend stairs, and to increase LEFS > 10 points to facilitate increased ability to perform functional activities of choice. Balance board was introduced challenged ankle stability and control. Will continue POC progressing as able. Patient will continue to benefit from skilled PT services to modify and progress therapeutic interventions, address functional mobility deficits, address ROM deficits, address strength deficits, analyze and address soft tissue restrictions, analyze and cue movement patterns, analyze and modify body mechanics/ergonomics, assess and modify postural abnormalities, and address imbalance/dizziness to attain remaining goals.      [x]  See Plan of Care  []  See progress note/recertification  []  See Discharge Summary         PLAN  []  Upgrade activities as tolerated     [x]  Continue plan of care  []  Update interventions per flow sheet       []  Discharge due to:_  []  Other:    Kendrick Perrin  10/20/2022  2:47 PM no

## 2022-10-28 RX ORDER — POLYETHYLENE GLYCOL 3350 AND ELECTROLYTES WITH LEMON FLAVOR 236; 22.74; 6.74; 5.86; 2.97 G/4L; G/4L; G/4L; G/4L; G/4L
236 POWDER, FOR SOLUTION ORAL
Qty: 1 | Refills: 0 | Status: ACTIVE | COMMUNITY
Start: 2022-10-28 | End: 1900-01-01

## 2022-11-16 ENCOUNTER — APPOINTMENT (OUTPATIENT)
Dept: GASTROENTEROLOGY | Facility: CLINIC | Age: 87
End: 2022-11-16

## 2023-01-13 ENCOUNTER — APPOINTMENT (OUTPATIENT)
Dept: RADIOLOGY | Facility: HOSPITAL | Age: 88
End: 2023-01-13
Payer: MEDICARE

## 2023-01-13 ENCOUNTER — OUTPATIENT (OUTPATIENT)
Dept: OUTPATIENT SERVICES | Facility: HOSPITAL | Age: 88
LOS: 1 days | End: 2023-01-13

## 2023-01-13 DIAGNOSIS — Z90.49 ACQUIRED ABSENCE OF OTHER SPECIFIED PARTS OF DIGESTIVE TRACT: Chronic | ICD-10-CM

## 2023-01-13 DIAGNOSIS — K56.699 OTHER INTESTINAL OBSTRUCTION UNSPECIFIED AS TO PARTIAL VERSUS COMPLETE OBSTRUCTION: ICD-10-CM

## 2023-01-13 PROCEDURE — 74270 X-RAY XM COLON 1CNTRST STD: CPT | Mod: 26

## 2023-01-15 ENCOUNTER — OUTPATIENT (OUTPATIENT)
Dept: OUTPATIENT SERVICES | Facility: HOSPITAL | Age: 88
LOS: 1 days | End: 2023-01-15
Payer: MEDICARE

## 2023-01-15 DIAGNOSIS — Z90.49 ACQUIRED ABSENCE OF OTHER SPECIFIED PARTS OF DIGESTIVE TRACT: Chronic | ICD-10-CM

## 2023-01-15 DIAGNOSIS — Z11.52 ENCOUNTER FOR SCREENING FOR COVID-19: ICD-10-CM

## 2023-01-15 LAB — SARS-COV-2 RNA SPEC QL NAA+PROBE: SIGNIFICANT CHANGE UP

## 2023-01-15 PROCEDURE — C9803: CPT

## 2023-01-15 PROCEDURE — U0003: CPT

## 2023-01-15 PROCEDURE — U0005: CPT

## 2023-01-18 ENCOUNTER — RESULT REVIEW (OUTPATIENT)
Age: 88
End: 2023-01-18

## 2023-01-18 ENCOUNTER — APPOINTMENT (OUTPATIENT)
Dept: GASTROENTEROLOGY | Facility: HOSPITAL | Age: 88
End: 2023-01-18

## 2023-01-18 ENCOUNTER — OUTPATIENT (OUTPATIENT)
Dept: OUTPATIENT SERVICES | Facility: HOSPITAL | Age: 88
LOS: 1 days | End: 2023-01-18
Payer: MEDICARE

## 2023-01-18 ENCOUNTER — TRANSCRIPTION ENCOUNTER (OUTPATIENT)
Age: 88
End: 2023-01-18

## 2023-01-18 VITALS
RESPIRATION RATE: 24 BRPM | TEMPERATURE: 97 F | OXYGEN SATURATION: 99 % | HEIGHT: 62 IN | SYSTOLIC BLOOD PRESSURE: 153 MMHG | HEART RATE: 66 BPM | DIASTOLIC BLOOD PRESSURE: 68 MMHG | WEIGHT: 160.06 LBS

## 2023-01-18 VITALS
HEART RATE: 68 BPM | DIASTOLIC BLOOD PRESSURE: 63 MMHG | RESPIRATION RATE: 20 BRPM | SYSTOLIC BLOOD PRESSURE: 146 MMHG | OXYGEN SATURATION: 99 %

## 2023-01-18 DIAGNOSIS — K56.699 OTHER INTESTINAL OBSTRUCTION UNSPECIFIED AS TO PARTIAL VERSUS COMPLETE OBSTRUCTION: ICD-10-CM

## 2023-01-18 DIAGNOSIS — Z90.49 ACQUIRED ABSENCE OF OTHER SPECIFIED PARTS OF DIGESTIVE TRACT: Chronic | ICD-10-CM

## 2023-01-18 PROCEDURE — 88305 TISSUE EXAM BY PATHOLOGIST: CPT | Mod: 26

## 2023-01-18 PROCEDURE — 45331 SIGMOIDOSCOPY AND BIOPSY: CPT | Mod: GC

## 2023-01-18 PROCEDURE — 45331 SIGMOIDOSCOPY AND BIOPSY: CPT

## 2023-01-18 PROCEDURE — 76000 FLUOROSCOPY <1 HR PHYS/QHP: CPT

## 2023-01-18 PROCEDURE — 88305 TISSUE EXAM BY PATHOLOGIST: CPT

## 2023-01-18 PROCEDURE — 74330 X-RAY BILE/PANC ENDOSCOPY: CPT

## 2023-01-18 NOTE — ASU DISCHARGE PLAN (ADULT/PEDIATRIC) - NSDISCH_DIET_ENDO_ALL_CORE_FT
Bina Pedroza, a  at 38w3d with an BETZY of 2018, by Ultrasound, was seen at [facility] LABOR DELIVERY for a nonstress test.    Chief Complaint   Patient presents with   • Contractions     Pt c/o UC which started around midnight and were 5-6 min apart. Pt also reports that she had sex last night and the UC started right after that.-VB,-ROM, +FM       Interpretation A  Nonstress Test Interpretation A: Reactive (18 [id number] : Chantal Price RN)  Comments A: strip reviewed with GILES Ivey RN (18 0650 : Chantal Price RN)        No cervical change, UA and vag panel normal   You should resume your previous diet unless otherwise instructed by your doctor. Do not drink alcoholic beverages for the next 24 hours.

## 2023-01-18 NOTE — ASU DISCHARGE PLAN (ADULT/PEDIATRIC) - NSDISCH_BIOPSY_ENDO_ALL_CORE_FT
If you had a biopsy, you should not take aspirin or aspirin like products for the next 10 days unless instructed to do so by your doctor. If you had a biopsy, check with your doctor before taking any blood thinners such as warfarin (Coumadin). Self

## 2023-01-18 NOTE — PRE PROCEDURE NOTE - PRE PROCEDURE EVALUATION
Attending Physician:  Dr Black                       Procedure: Flex SIg    Indication for Procedure: sigmoid stricture  ________________________________________________________  PAST MEDICAL & SURGICAL HISTORY:  HTN (hypertension)      Colon cancer      GERD (gastroesophageal reflux disease)      S/P colon resection      History of cholecystectomy        ALLERGIES:  No Known Allergies    HOME MEDICATIONS:  amlodipine-benazepril 10 mg-20 mg oral capsule: 1 cap(s) orally once a day  donepezil 5 mg oral tablet: 1 tab(s) orally once a day (at bedtime)  pancrelipase 10,000 units-30,000 units-30,000 units oral delayed release capsule: 1 dose(s) orally once a day  pantoprazole 40 mg oral delayed release tablet: 1 tab(s) orally once a day  Vitamin D3 400 intl units (10 mcg) oral tablet: 1 tab(s) orally once a day    AICD/PPM: [ ] yes   [x ] no    PERTINENT LAB DATA:                      PHYSICAL EXAMINATION:    Height (cm): 157.5  Weight (kg): 72.6  BMI (kg/m2): 29.3  BSA (m2): 1.74T(C): 35.9  HR: 66  BP: 153/68  RR: 24  SpO2: 99%    Constitutional: NAD  HEENT: PERRLA, EOMI,    Neck:  No JVD  Respiratory: CTAB/L  Cardiovascular: S1 and S2  Gastrointestinal: BS+, soft, NT/ND  Extremities: No peripheral edema  Neurological: A/O x 3, no focal deficits  Psychiatric: Normal mood, normal affect  Skin: No rashes    ASA Class: I [ ]  II [x ]  III [ ]  IV [ ]    COMMENTS:    The patient is a suitable candidate for the planned procedure unless box checked [ ]  No, explain:

## 2023-01-24 LAB — SURGICAL PATHOLOGY STUDY: SIGNIFICANT CHANGE UP

## 2023-03-22 NOTE — PROGRESS NOTE ADULT - PROBLEM SELECTOR PLAN 3
Pt informed that Dr. Cruz recommends starting with an OV first.  Pt is concerned waiting for an ultrasound as she leaves for [place] 4/7 to 4/23 and wants her pelvic pain figured out before she leaves.  Pt asks if there are any sooner appts available.   Pt scheduled with Dr. Beltrán tomorrow 3/23 at 3pm.  Pt verbalizes understanding, agrees with POC.     on pantoprazole 40mg qd  - hold due to NPO

## 2023-07-08 ENCOUNTER — INPATIENT (INPATIENT)
Facility: HOSPITAL | Age: 88
LOS: 1 days | Discharge: HOME | End: 2023-07-10
Attending: SURGERY | Admitting: SURGERY
Payer: MEDICARE

## 2023-07-08 VITALS
TEMPERATURE: 99 F | DIASTOLIC BLOOD PRESSURE: 50 MMHG | RESPIRATION RATE: 16 BRPM | OXYGEN SATURATION: 100 % | HEART RATE: 52 BPM | SYSTOLIC BLOOD PRESSURE: 165 MMHG

## 2023-07-08 DIAGNOSIS — Z90.49 ACQUIRED ABSENCE OF OTHER SPECIFIED PARTS OF DIGESTIVE TRACT: Chronic | ICD-10-CM

## 2023-07-08 DIAGNOSIS — K56.609 UNSPECIFIED INTESTINAL OBSTRUCTION, UNSPECIFIED AS TO PARTIAL VERSUS COMPLETE OBSTRUCTION: ICD-10-CM

## 2023-07-08 LAB
ALBUMIN SERPL ELPH-MCNC: 4.4 G/DL — SIGNIFICANT CHANGE UP (ref 3.3–5)
ALP SERPL-CCNC: 61 U/L — SIGNIFICANT CHANGE UP (ref 40–120)
ALT FLD-CCNC: 11 U/L — SIGNIFICANT CHANGE UP (ref 4–33)
ANION GAP SERPL CALC-SCNC: 9 MMOL/L — SIGNIFICANT CHANGE UP (ref 7–14)
APTT BLD: 31.1 SEC — SIGNIFICANT CHANGE UP (ref 27–36.3)
AST SERPL-CCNC: 19 U/L — SIGNIFICANT CHANGE UP (ref 4–32)
BASE EXCESS BLDV CALC-SCNC: 0.3 MMOL/L — SIGNIFICANT CHANGE UP (ref -2–3)
BASOPHILS # BLD AUTO: 0.05 K/UL — SIGNIFICANT CHANGE UP (ref 0–0.2)
BASOPHILS NFR BLD AUTO: 0.7 % — SIGNIFICANT CHANGE UP (ref 0–2)
BILIRUB SERPL-MCNC: 0.9 MG/DL — SIGNIFICANT CHANGE UP (ref 0.2–1.2)
BLOOD GAS VENOUS COMPREHENSIVE RESULT: SIGNIFICANT CHANGE UP
BUN SERPL-MCNC: 16 MG/DL — SIGNIFICANT CHANGE UP (ref 7–23)
CALCIUM SERPL-MCNC: 10.5 MG/DL — SIGNIFICANT CHANGE UP (ref 8.4–10.5)
CHLORIDE BLDV-SCNC: 110 MMOL/L — HIGH (ref 96–108)
CHLORIDE SERPL-SCNC: 108 MMOL/L — HIGH (ref 98–107)
CO2 BLDV-SCNC: 29.7 MMOL/L — HIGH (ref 22–26)
CO2 SERPL-SCNC: 25 MMOL/L — SIGNIFICANT CHANGE UP (ref 22–31)
CREAT SERPL-MCNC: 1.32 MG/DL — HIGH (ref 0.5–1.3)
EGFR: 39 ML/MIN/1.73M2 — LOW
EOSINOPHIL # BLD AUTO: 0.05 K/UL — SIGNIFICANT CHANGE UP (ref 0–0.5)
EOSINOPHIL NFR BLD AUTO: 0.7 % — SIGNIFICANT CHANGE UP (ref 0–6)
GAS PNL BLDV: 139 MMOL/L — SIGNIFICANT CHANGE UP (ref 136–145)
GLUCOSE BLDV-MCNC: 96 MG/DL — SIGNIFICANT CHANGE UP (ref 70–99)
GLUCOSE SERPL-MCNC: 100 MG/DL — HIGH (ref 70–99)
HCO3 BLDV-SCNC: 28 MMOL/L — SIGNIFICANT CHANGE UP (ref 22–29)
HCT VFR BLD CALC: 36.4 % — SIGNIFICANT CHANGE UP (ref 34.5–45)
HCT VFR BLDA CALC: 36 % — SIGNIFICANT CHANGE UP (ref 34.5–46.5)
HGB BLD CALC-MCNC: 11.9 G/DL — SIGNIFICANT CHANGE UP (ref 11.7–16.1)
HGB BLD-MCNC: 12 G/DL — SIGNIFICANT CHANGE UP (ref 11.5–15.5)
IANC: 5.33 K/UL — SIGNIFICANT CHANGE UP (ref 1.8–7.4)
IMM GRANULOCYTES NFR BLD AUTO: 0.3 % — SIGNIFICANT CHANGE UP (ref 0–0.9)
INR BLD: 1.08 RATIO — SIGNIFICANT CHANGE UP (ref 0.88–1.16)
LACTATE BLDV-MCNC: 1.9 MMOL/L — SIGNIFICANT CHANGE UP (ref 0.5–2)
LIDOCAIN IGE QN: 21 U/L — SIGNIFICANT CHANGE UP (ref 7–60)
LYMPHOCYTES # BLD AUTO: 1.03 K/UL — SIGNIFICANT CHANGE UP (ref 1–3.3)
LYMPHOCYTES # BLD AUTO: 14.8 % — SIGNIFICANT CHANGE UP (ref 13–44)
MCHC RBC-ENTMCNC: 30.2 PG — SIGNIFICANT CHANGE UP (ref 27–34)
MCHC RBC-ENTMCNC: 33 GM/DL — SIGNIFICANT CHANGE UP (ref 32–36)
MCV RBC AUTO: 91.7 FL — SIGNIFICANT CHANGE UP (ref 80–100)
MONOCYTES # BLD AUTO: 0.5 K/UL — SIGNIFICANT CHANGE UP (ref 0–0.9)
MONOCYTES NFR BLD AUTO: 7.2 % — SIGNIFICANT CHANGE UP (ref 2–14)
NEUTROPHILS # BLD AUTO: 5.33 K/UL — SIGNIFICANT CHANGE UP (ref 1.8–7.4)
NEUTROPHILS NFR BLD AUTO: 76.3 % — SIGNIFICANT CHANGE UP (ref 43–77)
NRBC # BLD: 0 /100 WBCS — SIGNIFICANT CHANGE UP (ref 0–0)
NRBC # FLD: 0 K/UL — SIGNIFICANT CHANGE UP (ref 0–0)
PCO2 BLDV: 58 MMHG — HIGH (ref 39–52)
PH BLDV: 7.29 — LOW (ref 7.32–7.43)
PLATELET # BLD AUTO: 299 K/UL — SIGNIFICANT CHANGE UP (ref 150–400)
PO2 BLDV: 28 MMHG — SIGNIFICANT CHANGE UP (ref 25–45)
POTASSIUM BLDV-SCNC: 5.8 MMOL/L — HIGH (ref 3.5–5.1)
POTASSIUM SERPL-MCNC: 5.2 MMOL/L — SIGNIFICANT CHANGE UP (ref 3.5–5.3)
POTASSIUM SERPL-SCNC: 5.2 MMOL/L — SIGNIFICANT CHANGE UP (ref 3.5–5.3)
PROT SERPL-MCNC: 7 G/DL — SIGNIFICANT CHANGE UP (ref 6–8.3)
PROTHROM AB SERPL-ACNC: 12.5 SEC — SIGNIFICANT CHANGE UP (ref 10.5–13.4)
RBC # BLD: 3.97 M/UL — SIGNIFICANT CHANGE UP (ref 3.8–5.2)
RBC # FLD: 14.1 % — SIGNIFICANT CHANGE UP (ref 10.3–14.5)
SAO2 % BLDV: 37.1 % — LOW (ref 67–88)
SODIUM SERPL-SCNC: 142 MMOL/L — SIGNIFICANT CHANGE UP (ref 135–145)
WBC # BLD: 6.98 K/UL — SIGNIFICANT CHANGE UP (ref 3.8–10.5)
WBC # FLD AUTO: 6.98 K/UL — SIGNIFICANT CHANGE UP (ref 3.8–10.5)

## 2023-07-08 PROCEDURE — 99285 EMERGENCY DEPT VISIT HI MDM: CPT

## 2023-07-08 PROCEDURE — 74177 CT ABD & PELVIS W/CONTRAST: CPT | Mod: 26,MA

## 2023-07-08 RX ORDER — SODIUM CHLORIDE 9 MG/ML
500 INJECTION INTRAMUSCULAR; INTRAVENOUS; SUBCUTANEOUS ONCE
Refills: 0 | Status: COMPLETED | OUTPATIENT
Start: 2023-07-08 | End: 2023-07-08

## 2023-07-08 RX ORDER — FAMOTIDINE 10 MG/ML
20 INJECTION INTRAVENOUS ONCE
Refills: 0 | Status: COMPLETED | OUTPATIENT
Start: 2023-07-08 | End: 2023-07-08

## 2023-07-08 RX ORDER — ACETAMINOPHEN 500 MG
1000 TABLET ORAL ONCE
Refills: 0 | Status: COMPLETED | OUTPATIENT
Start: 2023-07-08 | End: 2023-07-08

## 2023-07-08 RX ORDER — IOHEXOL 300 MG/ML
15 INJECTION, SOLUTION INTRAVENOUS ONCE
Refills: 0 | Status: COMPLETED | OUTPATIENT
Start: 2023-07-08 | End: 2023-07-08

## 2023-07-08 RX ADMIN — Medication 400 MILLIGRAM(S): at 17:05

## 2023-07-08 RX ADMIN — SODIUM CHLORIDE 500 MILLILITER(S): 9 INJECTION INTRAMUSCULAR; INTRAVENOUS; SUBCUTANEOUS at 19:11

## 2023-07-08 RX ADMIN — IOHEXOL 15 MILLILITER(S): 300 INJECTION, SOLUTION INTRAVENOUS at 18:17

## 2023-07-08 RX ADMIN — Medication 30 MILLILITER(S): at 17:05

## 2023-07-08 RX ADMIN — FAMOTIDINE 20 MILLIGRAM(S): 10 INJECTION INTRAVENOUS at 17:05

## 2023-07-08 NOTE — ED PROVIDER NOTE - CLINICAL SUMMARY MEDICAL DECISION MAKING FREE TEXT BOX
87-year-old female complains of increasing abdominal pain x1 week.  History of colon cancer status post resection and status postcholecystectomy.  History of constipation and small bowel movement this morning.  Abdominal pain likely from GI etiology.  No  or GYN complaints.  Diffuse pain unlikely AAA.  Consider SBO, colitis, appendicitis (unsure how much colon and if appendix still present), constipation, or other GI etiology.  We will check labs including lipase and VBG.  UA.  CT of abdomen and pelvis with oral and IV contrast if creatinine not significantly elevated.  As decreased p.o. intake will give gentle IV hydration and pain medication.

## 2023-07-08 NOTE — H&P ADULT - ASSESSMENT
Pt is an 88 y/o female w/ PMHx of colon cancer (s/p resection ~20 years ago), possible cervical cancer treated with radiation (daughter is unsure), cholecystectomy (3-4 years ago) HTN and previous LBO (2022) who presented for 1 week of diffuse abdominal pain and 3 days of constipation. Pt denies nausea, vomiting but endorses hiccuping, . CTAP w/ PO/IV contrast concerning for partial small bowel obstruction  2/2 stricture d/t adhesions vs inflammation. Of note, CT scan also showed a second focal stricture in the sigmoid colon corresponding to findings on previous imaging.     Plan:   - NGT   - Diet: NPO   - IVF: LR  - no pain meds prior to abd exam  - DVT ppx + SCDs   - possible GG challenge tomorrow    Discussed with Dr. Handley Team Surgery  [id number]  Pt is an 88 y/o female w/ PMHx of colon cancer (s/p resection ~20 years ago), possible cervical cancer treated with radiation (daughter is unsure), cholecystectomy (3-4 years ago) HTN and previous LBO (2022) who presented for 1 week of diffuse abdominal pain and 3 days of constipation. Pt denies nausea, vomiting but endorses hiccuping, Abd exam notable for mild tenderness diffusely. Labs notable for NOMI Cr 1.3. CTAP w/ PO/IV contrast concerning for partial small bowel obstruction  2/2 stricture d/t adhesions vs inflammation. Of note, CT scan also showed a second focal stricture in the sigmoid colon corresponding to findings on previous imaging.     Plan:   - NGT   - Diet: NPO   - IVF: LR  - no pain meds prior to abd exam  - DVT ppx + SCDs   - possible GG challenge tomorrow    Discussed with Dr. Handley Team Surgery  [id number]

## 2023-07-08 NOTE — ED PROVIDER NOTE - OBJECTIVE STATEMENT
[place] - 87-year-old female speaks [place] Creole.  Daughter at bedside assisting with history and translation.  Offered patient  service, requests daughter for translation.    Complains of diffuse abdominal pain for past week which has been increasing in intensity and severe since last night.  Decreased p.o. intake.  Ate soup today.  No nausea or vomiting.  Last bowel movement was this morning but only a little stool.  No fever.  Denies chest pain, shortness of breath, recent illness or trauma.  Previous [facility] records reviewed.  Noted past medical history including colon cancer status postresection.  Also history of GERD and constipation for which she takes senna.  Past surgical history includes a cholecystectomy.

## 2023-07-08 NOTE — ED ADULT TRIAGE NOTE - CHIEF COMPLAINT QUOTE
Presents to ED complaining of worsening abdominal pain x 1 week. Denies nausea, vomiting, diarrhea. History of GERD, colon CA, HTN.

## 2023-07-08 NOTE — H&P ADULT - NSHPREVIEWOFSYSTEMS_GEN_ALL_CORE
REVIEW OF SYSTEMS:  RESPIRATORY: mild congestion that started today  CARDIOVASCULAR: No chest pain   GASTROINTESTINAL: per HPI

## 2023-07-08 NOTE — ED PROVIDER NOTE - PROGRESS NOTE DETAILS
Louie Arguello MD (PGY-4): CT abdomen pelvis showing small bowel stricture with associated partial small bowel obstruction.  Patient informed and surgery consulted.  They will see the patient at bedside shortly.  She continues to rest comfortably no nausea no vomiting.

## 2023-07-08 NOTE — ED ADULT NURSE NOTE - NSFALLUNIVINTERV_ED_ALL_ED
Bed/Stretcher in lowest position, wheels locked, appropriate side rails in place/Call bell, personal items and telephone in reach/Instruct patient to call for assistance before getting out of bed/chair/stretcher/Non-slip footwear applied when patient is off stretcher/[place] to call system/Physically safe environment - no spills, clutter or unnecessary equipment/Purposeful proactive rounding/Room/bathroom lighting operational, light cord in reach

## 2023-07-08 NOTE — H&P ADULT - HISTORY OF PRESENT ILLNESS
Pt is an 86 y/o female w/ PMHx of colon cancer (s/p resection ~20 years ago), possible cervical cancer treated with radiation (daughter/patient is unsure), cholecystectomy (3-4 years ago) HTN and previous ?LBO 2/2 colonic stricture (2022) who presented for 1 week of diffuse abdominal pain and 3 days of constipation. Pt states that her abdominal pain worsened throughout the week and is currently 8/10 and responds minimally to Tylenol. She has been passing flatus and had a small hard stool this AM but previously had not had a BM for 3 days. She also endorses hiccuping that has been present for the past few months. Patient denies nausea, vomiting and chest pain. She endorses intermittent SOB.     In the ED patient is HDS, byron with Cr 1.3, acidotic with ph 7.29, CT AP revealed enlarged stomach with air fluid level partial small bowel obstruction 2/2 possible stricture from adhesions vs inflammation. Of note, CT scan also showed a second focal stricture in the sigmoid colon corresponding to findings on previous imaging.

## 2023-07-08 NOTE — ED PROVIDER NOTE - NSICDXFAMILYHX_GEN_ALL_CORE_FT
FAMILY HISTORY:  Sibling  Still living? Unknown  FHx: colon cancer, Age at diagnosis: Age Unknown

## 2023-07-08 NOTE — ED ADULT NURSE REASSESSMENT NOTE - NS ED NURSE REASSESS COMMENT FT1
Received report from Day RN, pt came due to abdominal pain, pt creole speaking with family at bedside to translate. Pt states her pain came back but only 3 out fo 10. Pt pain is tolerable at this moment.  Pt safety maintained. pt waiting for surgical consult.

## 2023-07-08 NOTE — ED PROVIDER NOTE - PHYSICAL EXAMINATION
ATTENDING PHYSICAL EXAM  GEN - NAD; A+O x3  HEAD - NC/AT; EYES/NOSE - PERRL, EOMI, mucous membranes slightly dry, no discharge; THROAT: Oral cavity and pharynx normal. No inflammation, swelling, exudate, or lesions  NECK: Neck supple, non-tender without lymphadenopathy, no masses, no JVD  PULMONARY - CTA b/l, symmetric breath sounds, no w/r/r  CARDIAC -s1s2, RRR (mild saw in triage vitals, HR now 64reg), no M,R,G  ABDOMEN - Noted old surgical scars, +NABS, ND, + tenderness x 4 quadrants, soft, no guarding, no rebound, no mass appreciated  BACK - no CVA tenderness, No vertebral or paravertebral tenderness  EXTREMITIES - symmetric pulses, 2+ dp, capillary refill < 2 seconds, no clubbing, no cyanosis, no edema  SKIN - no rash or bruising

## 2023-07-08 NOTE — H&P ADULT - NSHPLABSRESULTS_GEN_ALL_CORE
07-08    142  |  108<H>  |  16  ----------------------------<  100<H>  5.2   |  25  |  1.32<H>    Ca    10.5      08 Jul 2023 16:36    TPro  7.0  /  Alb  4.4  /  TBili  0.9  /  DBili  x   /  AST  19  /  ALT  11  /  AlkPhos  61  07-08                          12.0   6.98  )-----------( 299      ( 08 Jul 2023 16:36 )             36.4

## 2023-07-08 NOTE — H&P ADULT - NSHPPHYSICALEXAM_GEN_ALL_CORE
VITAL SIGNS:  Vital Signs Last 24 Hrs  T(C): 37 (08 Jul 2023 21:01), Max: 37.1 (08 Jul 2023 15:04)  T(F): 98.6 (08 Jul 2023 21:01), Max: 98.8 (08 Jul 2023 15:04)  HR: 60 (08 Jul 2023 21:01) (52 - 61)  BP: 156/66 (08 Jul 2023 21:01) (156/66 - 178/68)  BP(mean): --  RR: 16 (08 Jul 2023 21:01) (16 - 16)  SpO2: 99% (08 Jul 2023 21:01) (99% - 100%)    Parameters below as of 08 Jul 2023 17:42  Patient On (Oxygen Delivery Method): room air      PHYSICAL EXAM:    General: NAD, resting in bed   HEENT: NC/AT, EOMI  Cardio: NSR  Resp: Good effort, normal cw expansion  Thorax: No chest wall tenderness  GI/Abd: Soft, diffusely tender to palpation, mildly distended  Vascular: Extremities warm well perfused   neuro: alert and oriented  Skin: Intact, no breakdown  Musculoskeletal: All 4 extremities moving spontaneously

## 2023-07-09 LAB
ANION GAP SERPL CALC-SCNC: 13 MMOL/L — SIGNIFICANT CHANGE UP (ref 7–14)
BUN SERPL-MCNC: 14 MG/DL — SIGNIFICANT CHANGE UP (ref 7–23)
CALCIUM SERPL-MCNC: 10 MG/DL — SIGNIFICANT CHANGE UP (ref 8.4–10.5)
CHLORIDE SERPL-SCNC: 108 MMOL/L — HIGH (ref 98–107)
CO2 SERPL-SCNC: 21 MMOL/L — LOW (ref 22–31)
CREAT SERPL-MCNC: 1.19 MG/DL — SIGNIFICANT CHANGE UP (ref 0.5–1.3)
EGFR: 45 ML/MIN/1.73M2 — LOW
GLUCOSE SERPL-MCNC: 91 MG/DL — SIGNIFICANT CHANGE UP (ref 70–99)
HCT VFR BLD CALC: 35.5 % — SIGNIFICANT CHANGE UP (ref 34.5–45)
HGB BLD-MCNC: 11.5 G/DL — SIGNIFICANT CHANGE UP (ref 11.5–15.5)
MAGNESIUM SERPL-MCNC: 1.9 MG/DL — SIGNIFICANT CHANGE UP (ref 1.6–2.6)
MCHC RBC-ENTMCNC: 28.9 PG — SIGNIFICANT CHANGE UP (ref 27–34)
MCHC RBC-ENTMCNC: 32.4 GM/DL — SIGNIFICANT CHANGE UP (ref 32–36)
MCV RBC AUTO: 89.2 FL — SIGNIFICANT CHANGE UP (ref 80–100)
NRBC # BLD: 0 /100 WBCS — SIGNIFICANT CHANGE UP (ref 0–0)
NRBC # FLD: 0 K/UL — SIGNIFICANT CHANGE UP (ref 0–0)
PHOSPHATE SERPL-MCNC: 3 MG/DL — SIGNIFICANT CHANGE UP (ref 2.5–4.5)
PLATELET # BLD AUTO: 284 K/UL — SIGNIFICANT CHANGE UP (ref 150–400)
POTASSIUM SERPL-MCNC: 4.6 MMOL/L — SIGNIFICANT CHANGE UP (ref 3.5–5.3)
POTASSIUM SERPL-SCNC: 4.6 MMOL/L — SIGNIFICANT CHANGE UP (ref 3.5–5.3)
RBC # BLD: 3.98 M/UL — SIGNIFICANT CHANGE UP (ref 3.8–5.2)
RBC # FLD: 14 % — SIGNIFICANT CHANGE UP (ref 10.3–14.5)
SODIUM SERPL-SCNC: 142 MMOL/L — SIGNIFICANT CHANGE UP (ref 135–145)
WBC # BLD: 5.65 K/UL — SIGNIFICANT CHANGE UP (ref 3.8–10.5)
WBC # FLD AUTO: 5.65 K/UL — SIGNIFICANT CHANGE UP (ref 3.8–10.5)

## 2023-07-09 PROCEDURE — 99232 SBSQ HOSP IP/OBS MODERATE 35: CPT

## 2023-07-09 PROCEDURE — 71045 X-RAY EXAM CHEST 1 VIEW: CPT | Mod: 26

## 2023-07-09 PROCEDURE — 74018 RADEX ABDOMEN 1 VIEW: CPT | Mod: 26

## 2023-07-09 PROCEDURE — 99238 HOSP IP/OBS DSCHRG MGMT 30/<: CPT | Mod: GC

## 2023-07-09 RX ORDER — CHOLECALCIFEROL (VITAMIN D3) 125 MCG
1 CAPSULE ORAL
Qty: 0 | Refills: 0 | DISCHARGE

## 2023-07-09 RX ORDER — DEXTROSE MONOHYDRATE, SODIUM CHLORIDE, AND POTASSIUM CHLORIDE 50; .745; 4.5 G/1000ML; G/1000ML; G/1000ML
1000 INJECTION, SOLUTION INTRAVENOUS
Refills: 0 | Status: DISCONTINUED | OUTPATIENT
Start: 2023-07-09 | End: 2023-07-10

## 2023-07-09 RX ORDER — IOHEXOL 300 MG/ML
30 INJECTION, SOLUTION INTRAVENOUS ONCE
Refills: 0 | Status: COMPLETED | OUTPATIENT
Start: 2023-07-09 | End: 2023-07-09

## 2023-07-09 RX ORDER — HEPARIN SODIUM 5000 [USP'U]/ML
5000 INJECTION INTRAVENOUS; SUBCUTANEOUS EVERY 8 HOURS
Refills: 0 | Status: DISCONTINUED | OUTPATIENT
Start: 2023-07-09 | End: 2023-07-10

## 2023-07-09 RX ORDER — DEXTROSE MONOHYDRATE, SODIUM CHLORIDE, AND POTASSIUM CHLORIDE 50; .745; 4.5 G/1000ML; G/1000ML; G/1000ML
1000 INJECTION, SOLUTION INTRAVENOUS
Refills: 0 | Status: DISCONTINUED | OUTPATIENT
Start: 2023-07-09 | End: 2023-07-09

## 2023-07-09 RX ORDER — LIPASE/PROTEASE/AMYLASE 16-48-48K
1 CAPSULE,DELAYED RELEASE (ENTERIC COATED) ORAL
Qty: 0 | Refills: 0 | DISCHARGE

## 2023-07-09 RX ORDER — MAGNESIUM SULFATE 500 MG/ML
1 VIAL (ML) INJECTION ONCE
Refills: 0 | Status: COMPLETED | OUTPATIENT
Start: 2023-07-09 | End: 2023-07-09

## 2023-07-09 RX ORDER — SODIUM CHLORIDE 9 MG/ML
1000 INJECTION, SOLUTION INTRAVENOUS
Refills: 0 | Status: DISCONTINUED | OUTPATIENT
Start: 2023-07-09 | End: 2023-07-09

## 2023-07-09 RX ORDER — IOHEXOL 300 MG/ML
30 INJECTION, SOLUTION INTRAVENOUS ONCE
Refills: 0 | Status: DISCONTINUED | OUTPATIENT
Start: 2023-07-09 | End: 2023-07-09

## 2023-07-09 RX ORDER — AMLODIPINE BESYLATE AND BENAZEPRIL HYDROCHLORIDE 10; 20 MG/1; MG/1
1 CAPSULE ORAL
Qty: 0 | Refills: 0 | DISCHARGE

## 2023-07-09 RX ORDER — PANTOPRAZOLE SODIUM 20 MG/1
1 TABLET, DELAYED RELEASE ORAL
Qty: 0 | Refills: 0 | DISCHARGE

## 2023-07-09 RX ORDER — DONEPEZIL HYDROCHLORIDE 10 MG/1
1 TABLET, FILM COATED ORAL
Qty: 0 | Refills: 0 | DISCHARGE

## 2023-07-09 RX ADMIN — DEXTROSE MONOHYDRATE, SODIUM CHLORIDE, AND POTASSIUM CHLORIDE 100 MILLILITER(S): 50; .745; 4.5 INJECTION, SOLUTION INTRAVENOUS at 11:12

## 2023-07-09 RX ADMIN — HEPARIN SODIUM 5000 UNIT(S): 5000 INJECTION INTRAVENOUS; SUBCUTANEOUS at 05:05

## 2023-07-09 RX ADMIN — IOHEXOL 30 MILLILITER(S): 300 INJECTION, SOLUTION INTRAVENOUS at 09:26

## 2023-07-09 RX ADMIN — HEPARIN SODIUM 5000 UNIT(S): 5000 INJECTION INTRAVENOUS; SUBCUTANEOUS at 21:30

## 2023-07-09 RX ADMIN — DEXTROSE MONOHYDRATE, SODIUM CHLORIDE, AND POTASSIUM CHLORIDE 100 MILLILITER(S): 50; .745; 4.5 INJECTION, SOLUTION INTRAVENOUS at 21:30

## 2023-07-09 RX ADMIN — Medication 100 GRAM(S): at 11:12

## 2023-07-09 RX ADMIN — SODIUM CHLORIDE 115 MILLILITER(S): 9 INJECTION, SOLUTION INTRAVENOUS at 00:46

## 2023-07-09 RX ADMIN — HEPARIN SODIUM 5000 UNIT(S): 5000 INJECTION INTRAVENOUS; SUBCUTANEOUS at 14:10

## 2023-07-09 NOTE — PATIENT PROFILE ADULT - TOBACCO USE
Caller: Bev Ferrari    Relationship: Self    Best call back number: [phone]    Preferred pharmacy: [address] AT [facility] [phone] [facility] [phone]    What medications are you currently taking:   Current Outpatient Medications on File Prior to Visit   Medication Sig Dispense Refill   • azelastine (ASTELIN) 0.1 % nasal spray azelastine 137 mcg (0.1 %) nasal spray aerosol (Patient not taking: Reported on 4/15/2022)     • baclofen (LIORESAL) 10 MG tablet baclofen 10 mg tablet     • loratadine (CLARITIN) 10 MG tablet Take 10 mg by mouth Daily.     • multivitamin with minerals tablet tablet      • omeprazole (priLOSEC) 20 MG capsule Take 20 mg by mouth Daily.     • OXcarbazepine (TRILEPTAL) 150 MG tablet Take 150 mg by mouth 2 (Two) Times a Day.     • pregabalin (LYRICA) 200 MG capsule        No current facility-administered medications on file prior to visit.     Which medication are you concerned about: OXcarbazepine (TRILEPTAL) 150 MG tablet- Take 150 mg by mouth 2 (Two) Times a Day.    Who prescribed you this medication: DR. PERAZA    When did you start taking these medications: RESTARTED MEDICATION FOLLOWING LAST VISIT ON 4/1/22    What are your concerns: PT CALLED STATING THAT SHE HAS TITRATED UP TO OXCARBAZEPINE 450MG 3X DAILY. PT STATES THAT WHEN SHE IS TAKING THE 2ND DOSAGE OF THE DAY, SHE IS EXPERIENCING SIDE EFFECTS OF MODERATE DIZZINESS, GROGGINESS, & LIGHTHEADEDNESS. PT STATES THAT MEDICATION SIDE EFFECTS LAST FOR APPROXIMATELY 1-2 HOURS BEFORE WEARING OFF.    How long have you had these concerns: SINCE UPPING TO CURRENT DOSAGE OF OXCARBAZEPINE 450MG 3X DAILY.    PLEASE REVIEW AND ADVISE.   Never smoker

## 2023-07-09 NOTE — PROGRESS NOTE ADULT - ASSESSMENT
83F Creole speaking with hx of HTN, cholecystectomy and colon ca s/p resection 20 years ago presents with LBO. Patient not completely obstructed, currently passing flatus.    Plan:   - NPO  - IV fluids  - gastrograffin challenge today  - Serial abdominal exams  - Continue to monitor bowel function  - DVT ppx    B team Surgery  [id number]

## 2023-07-09 NOTE — PATIENT PROFILE ADULT - FALL HARM RISK - HARM RISK INTERVENTIONS

## 2023-07-09 NOTE — PROGRESS NOTE ADULT - SUBJECTIVE AND OBJECTIVE BOX
Surgery Progress Note    S: Patient seen and examined. No acute events overnight. NG tube placed yesterday with 600cc of output. No specific complaints this morning.    O:  Physical Exam:  Gen: Laying in bed, NAD  HEENT: atrumatic, EMOI  Resp: Unlabored breathing  Abd: soft, non-tender, mildly distended. No rebound or guarding.  Ext: Moves 4 extremities spontaneously    Vital Signs Last 24 Hrs  T(C): 37 (09 Jul 2023 09:49), Max: 37.1 (08 Jul 2023 15:04)  T(F): 98.6 (09 Jul 2023 09:49), Max: 98.8 (08 Jul 2023 15:04)  HR: 78 (09 Jul 2023 09:49) (52 - 78)  BP: 148/68 (09 Jul 2023 09:49) (145/71 - 178/68)  BP(mean): --  RR: 18 (09 Jul 2023 09:49) (16 - 18)  SpO2: 100% (09 Jul 2023 09:49) (99% - 100%)    Parameters below as of 09 Jul 2023 09:49  Patient On (Oxygen Delivery Method): room air        I&O's Detail    08 Jul 2023 07:01  -  09 Jul 2023 07:00  --------------------------------------------------------  IN:    Lactated Ringers: 610 mL  Total IN: 610 mL    OUT:    Nasogastric/Oral tube (mL): 650 mL    Voided (mL): 400 mL  Total OUT: 1050 mL    Total NET: -440 mL      09 Jul 2023 07:01  -  09 Jul 2023 12:27  --------------------------------------------------------  IN:  Total IN: 0 mL    OUT:    Nasogastric/Oral tube (mL): 100 mL    Voided (mL): 200 mL  Total OUT: 300 mL    Total NET: -300 mL                                11.5   5.65  )-----------( 284      ( 09 Jul 2023 05:44 )             35.5       07-09    142  |  108<H>  |  14  ----------------------------<  91  4.6   |  21<L>  |  1.19    Ca    10.0      09 Jul 2023 05:44  Phos  3.0     07-09  Mg     1.90     07-09    TPro  7.0  /  Alb  4.4  /  TBili  0.9  /  DBili  x   /  AST  19  /  ALT  11  /  AlkPhos  61  07-08

## 2023-07-10 ENCOUNTER — TRANSCRIPTION ENCOUNTER (OUTPATIENT)
Age: 88
End: 2023-07-10

## 2023-07-10 VITALS
DIASTOLIC BLOOD PRESSURE: 58 MMHG | HEART RATE: 62 BPM | TEMPERATURE: 98 F | RESPIRATION RATE: 18 BRPM | SYSTOLIC BLOOD PRESSURE: 152 MMHG | OXYGEN SATURATION: 100 %

## 2023-07-10 LAB
ANION GAP SERPL CALC-SCNC: 5 MMOL/L — LOW (ref 7–14)
BUN SERPL-MCNC: 12 MG/DL — SIGNIFICANT CHANGE UP (ref 7–23)
CALCIUM SERPL-MCNC: 10.2 MG/DL — SIGNIFICANT CHANGE UP (ref 8.4–10.5)
CHLORIDE SERPL-SCNC: 109 MMOL/L — HIGH (ref 98–107)
CO2 SERPL-SCNC: 23 MMOL/L — SIGNIFICANT CHANGE UP (ref 22–31)
CREAT SERPL-MCNC: 1.22 MG/DL — SIGNIFICANT CHANGE UP (ref 0.5–1.3)
EGFR: 43 ML/MIN/1.73M2 — LOW
GLUCOSE SERPL-MCNC: 82 MG/DL — SIGNIFICANT CHANGE UP (ref 70–99)
HCT VFR BLD CALC: 33.1 % — LOW (ref 34.5–45)
HGB BLD-MCNC: 10.7 G/DL — LOW (ref 11.5–15.5)
MAGNESIUM SERPL-MCNC: 2.3 MG/DL — SIGNIFICANT CHANGE UP (ref 1.6–2.6)
MCHC RBC-ENTMCNC: 29.2 PG — SIGNIFICANT CHANGE UP (ref 27–34)
MCHC RBC-ENTMCNC: 32.3 GM/DL — SIGNIFICANT CHANGE UP (ref 32–36)
MCV RBC AUTO: 90.2 FL — SIGNIFICANT CHANGE UP (ref 80–100)
NRBC # BLD: 0 /100 WBCS — SIGNIFICANT CHANGE UP (ref 0–0)
NRBC # FLD: 0 K/UL — SIGNIFICANT CHANGE UP (ref 0–0)
PHOSPHATE SERPL-MCNC: 2.5 MG/DL — SIGNIFICANT CHANGE UP (ref 2.5–4.5)
PLATELET # BLD AUTO: 269 K/UL — SIGNIFICANT CHANGE UP (ref 150–400)
POTASSIUM SERPL-MCNC: 4.7 MMOL/L — SIGNIFICANT CHANGE UP (ref 3.5–5.3)
POTASSIUM SERPL-SCNC: 4.7 MMOL/L — SIGNIFICANT CHANGE UP (ref 3.5–5.3)
RBC # BLD: 3.67 M/UL — LOW (ref 3.8–5.2)
RBC # FLD: 14.1 % — SIGNIFICANT CHANGE UP (ref 10.3–14.5)
SODIUM SERPL-SCNC: 137 MMOL/L — SIGNIFICANT CHANGE UP (ref 135–145)
WBC # BLD: 4.88 K/UL — SIGNIFICANT CHANGE UP (ref 3.8–10.5)
WBC # FLD AUTO: 4.88 K/UL — SIGNIFICANT CHANGE UP (ref 3.8–10.5)

## 2023-07-10 RX ORDER — LISINOPRIL 2.5 MG/1
20 TABLET ORAL EVERY 24 HOURS
Refills: 0 | Status: DISCONTINUED | OUTPATIENT
Start: 2023-07-10 | End: 2023-07-10

## 2023-07-10 RX ORDER — SODIUM CHLORIDE 9 MG/ML
1000 INJECTION, SOLUTION INTRAVENOUS
Refills: 0 | Status: DISCONTINUED | OUTPATIENT
Start: 2023-07-10 | End: 2023-07-10

## 2023-07-10 RX ORDER — DONEPEZIL HYDROCHLORIDE 10 MG/1
5 TABLET, FILM COATED ORAL AT BEDTIME
Refills: 0 | Status: DISCONTINUED | OUTPATIENT
Start: 2023-07-10 | End: 2023-07-10

## 2023-07-10 RX ORDER — AMLODIPINE BESYLATE 2.5 MG/1
10 TABLET ORAL EVERY 24 HOURS
Refills: 0 | Status: DISCONTINUED | OUTPATIENT
Start: 2023-07-10 | End: 2023-07-10

## 2023-07-10 RX ORDER — PANTOPRAZOLE SODIUM 20 MG/1
40 TABLET, DELAYED RELEASE ORAL
Refills: 0 | Status: DISCONTINUED | OUTPATIENT
Start: 2023-07-10 | End: 2023-07-10

## 2023-07-10 RX ADMIN — AMLODIPINE BESYLATE 10 MILLIGRAM(S): 2.5 TABLET ORAL at 09:51

## 2023-07-10 RX ADMIN — HEPARIN SODIUM 5000 UNIT(S): 5000 INJECTION INTRAVENOUS; SUBCUTANEOUS at 05:57

## 2023-07-10 RX ADMIN — SODIUM CHLORIDE 50 MILLILITER(S): 9 INJECTION, SOLUTION INTRAVENOUS at 04:50

## 2023-07-10 RX ADMIN — PANTOPRAZOLE SODIUM 40 MILLIGRAM(S): 20 TABLET, DELAYED RELEASE ORAL at 09:51

## 2023-07-10 NOTE — DISCHARGE NOTE PROVIDER - HOSPITAL COURSE
86 y/o female w/ PMHx of colon cancer (s/p resection ~20 years ago), possible cervical cancer treated with radiation (daughter is unsure), cholecystectomy (3-4 years ago) HTN and previous LBO (2022) who presented for 1 week of diffuse abdominal pain and 3 days of constipation.  CTAP w/ PO/IV contrast concerning for partial small bowel obstruction  2/2 stricture d/t adhesions vs inflammation. Of note, CT scan also showed a second focal stricture in the sigmoid colon corresponding to findings on previous imaging.   Patient got gastrograffin and bowel obstruction resolved.   Diet was restarted and advanced as tolerated.  At this time, patient is currently ambulating, voiding, tolerating a regular diet.  Patient has been deemed stable for discharge home with follow up as an outpatient.

## 2023-07-10 NOTE — DISCHARGE NOTE NURSING/CASE MANAGEMENT/SOCIAL WORK - NSDCPECAREGIVERED_GEN_ALL_CORE
Medline and carenotes for Bowel Obstruction, as well as DC Medications and side effects literature for patient reference. Medline and carenotes for LBO,  as well as DC Medications and side effects literature for patient reference.

## 2023-07-10 NOTE — PROGRESS NOTE ADULT - ASSESSMENT
83F Cre[place] speaking with hx of HTN, cholecystectomy and colon ca s/p resection 20 years ago presents with LBO. Patient not completely obstructed, currently passing flatus.    Plan: 83F Creole speaking with hx of HTN, cholecystectomy and colon ca s/p resection 20 years ago presents with SBO tolerating clears    Plan:  Regular diet  Start home meds  DC home if tolerates lunch  D/w  B team

## 2023-07-10 NOTE — DISCHARGE NOTE PROVIDER - NSDCCPCAREPLAN_GEN_ALL_CORE_FT
PRINCIPAL DISCHARGE DIAGNOSIS  Diagnosis: Small bowel obstruction  Assessment and Plan of Treatment: Resolved  Follow up with your Primary care doctor

## 2023-07-10 NOTE — DISCHARGE NOTE PROVIDER - CARE PROVIDER_API CALL
Sony uDnne  Surgery  [address] [place] [address], Level [place] [address]  Phone: [phone]  Fax: [phone]  Follow Up Time: 2 weeks

## 2023-07-10 NOTE — PROGRESS NOTE ADULT - ATTENDING COMMENTS
Pt seen and examined with surgical housestaff.  Agree with assessment and plan. Imp: Small bowel obstruction likely adhesive.  Gastrograffin challenge.
resolved aSBO  discharge planning once tolerates regular diet

## 2023-07-10 NOTE — DISCHARGE NOTE NURSING/CASE MANAGEMENT/SOCIAL WORK - NSDCPNINST_GEN_ALL_CORE
Call MD with any further signs of bowel obstruction, inability to pass flatus, persistent constipation or nausea and vomiting, unrelieved abdominal discomfort as well as with signs of bleeding.  Follow-up with your PMD and surgeon as instructed for Continuity of care.

## 2023-07-10 NOTE — DISCHARGE NOTE NURSING/CASE MANAGEMENT/SOCIAL WORK - NSDCPEFALRISK_GEN_ALL_CORE
For information on Fall & Injury Prevention, visit: [email or web address] OR  [email or web address] OR  https://www.cdc.gov/steadi/patient.html

## 2023-07-10 NOTE — DISCHARGE NOTE PROVIDER - NSDCMRMEDTOKEN_GEN_ALL_CORE_FT
amlodipine-benazepril 10 mg-20 mg oral capsule: 1 cap(s) orally once a day  donepezil 5 mg oral tablet: 1 tab(s) orally once a day (at bedtime)  pantoprazole 40 mg oral delayed release tablet: 1 tab(s) orally once a day  polyethylene glycol 3350 oral powder for reconstitution: 17 gram(s) orally 1 to 2 times a day  senna (sennosides) 8.6 mg oral tablet: 2 tab(s) orally once a day (at bedtime)

## 2023-07-10 NOTE — PROGRESS NOTE ADULT - SUBJECTIVE AND OBJECTIVE BOX
Surgery Progress Note     24hour Events:     Subjective:  Patient seen and examined.       Vital Signs:  Vital Signs Last 24 Hrs  T(C): 36.9 (09 Jul 2023 21:41), Max: 37 (09 Jul 2023 09:49)  T(F): 98.5 (09 Jul 2023 21:41), Max: 98.6 (09 Jul 2023 09:49)  HR: 59 (09 Jul 2023 21:41) (55 - 78)  BP: 150/50 (09 Jul 2023 21:41) (136/42 - 163/55)  BP(mean): --  RR: 17 (09 Jul 2023 21:41) (16 - 18)  SpO2: 99% (09 Jul 2023 21:41) (99% - 100%)    Parameters below as of 09 Jul 2023 21:41  Patient On (Oxygen Delivery Method): room air        CAPILLARY BLOOD GLUCOSE          I&O's Detail    08 Jul 2023 07:01  -  09 Jul 2023 07:00  --------------------------------------------------------  IN:    Lactated Ringers: 610 mL  Total IN: 610 mL    OUT:    Nasogastric/Oral tube (mL): 650 mL    Voided (mL): 400 mL  Total OUT: 1050 mL    Total NET: -440 mL      09 Jul 2023 07:01  -  10 Jul 2023 01:43  --------------------------------------------------------  IN:  Total IN: 0 mL    OUT:    Nasogastric/Oral tube (mL): 100 mL    Voided (mL): 650 mL  Total OUT: 750 mL    Total NET: -750 mL            Physical Exam:  General: NAD, resting comfortably in bed  HEENT: Normocephalic atraumatic  Respiratory: Nonlabored respirations  Cardio: pulse present  Abdomen:   Vascular: extremities are warm and well perfused.       Labs:    07-09    142  |  108<H>  |  14  ----------------------------<  91  4.6   |  21<L>  |  1.19    Ca    10.0      09 Jul 2023 05:44  Phos  3.0     07-09  Mg     1.90     07-09    TPro  7.0  /  Alb  4.4  /  TBili  0.9  /  DBili  x   /  AST  19  /  ALT  11  /  AlkPhos  61  07-08    LIVER FUNCTIONS - ( 08 Jul 2023 16:36 )  Alb: 4.4 g/dL / Pro: 7.0 g/dL / ALK PHOS: 61 U/L / ALT: 11 U/L / AST: 19 U/L / GGT: x                                 11.5   5.65  )-----------( 284      ( 09 Jul 2023 05:44 )             35.5     PT/INR - ( 08 Jul 2023 16:36 )   PT: 12.5 sec;   INR: 1.08 ratio         PTT - ( 08 Jul 2023 16:36 )  PTT:31.1 sec     Surgery Progress Note     24hour Events: none    Subjective:  Patient seen and examined. Tolerating clears, states she is passing gas and having bs and denies pain      Vital Signs:  Vital Signs Last 24 Hrs  T(C): 36.9 (09 Jul 2023 21:41), Max: 37 (09 Jul 2023 09:49)  T(F): 98.5 (09 Jul 2023 21:41), Max: 98.6 (09 Jul 2023 09:49)  HR: 59 (09 Jul 2023 21:41) (55 - 78)  BP: 150/50 (09 Jul 2023 21:41) (136/42 - 163/55)  BP(mean): --  RR: 17 (09 Jul 2023 21:41) (16 - 18)  SpO2: 99% (09 Jul 2023 21:41) (99% - 100%)    Parameters below as of 09 Jul 2023 21:41  Patient On (Oxygen Delivery Method): room air        CAPILLARY BLOOD GLUCOSE          I&O's Detail    08 Jul 2023 07:01  -  09 Jul 2023 07:00  --------------------------------------------------------  IN:    Lactated Ringers: 610 mL  Total IN: 610 mL    OUT:    Nasogastric/Oral tube (mL): 650 mL    Voided (mL): 400 mL  Total OUT: 1050 mL    Total NET: -440 mL      09 Jul 2023 07:01  -  10 Jul 2023 01:43  --------------------------------------------------------  IN:  Total IN: 0 mL    OUT:    Nasogastric/Oral tube (mL): 100 mL    Voided (mL): 650 mL  Total OUT: 750 mL    Total NET: -750 mL            Physical Exam:  General: NAD, resting comfortably in bed  Abdomen: soft nontender  Vascular: extremities are warm and well perfused.       Labs:    07-09    142  |  108<H>  |  14  ----------------------------<  91  4.6   |  21<L>  |  1.19    Ca    10.0      09 Jul 2023 05:44  Phos  3.0     07-09  Mg     1.90     07-09    TPro  7.0  /  Alb  4.4  /  TBili  0.9  /  DBili  x   /  AST  19  /  ALT  11  /  AlkPhos  61  07-08    LIVER FUNCTIONS - ( 08 Jul 2023 16:36 )  Alb: 4.4 g/dL / Pro: 7.0 g/dL / ALK PHOS: 61 U/L / ALT: 11 U/L / AST: 19 U/L / GGT: x                                 11.5   5.65  )-----------( 284      ( 09 Jul 2023 05:44 )             35.5     PT/INR - ( 08 Jul 2023 16:36 )   PT: 12.5 sec;   INR: 1.08 ratio         PTT - ( 08 Jul 2023 16:36 )  PTT:31.1 sec

## 2023-07-10 NOTE — DISCHARGE NOTE NURSING/CASE MANAGEMENT/SOCIAL WORK - PATIENT PORTAL LINK FT
You can access the FollowMyHealth Patient Portal offered by [facility] by registering at the following website: [email or web address]. By joining [email or web address]’s FollowMyHealth portal, you will also be able to view your health information using other applications (apps) compatible with our system.

## 2023-07-13 NOTE — ED ADULT TRIAGE NOTE - NS ED NURSE BANDS TYPE
Writer called and updated pt on medications and timeframe for UA.    Milady HORN RN Urology 7/13/2023 1:57 PM        
----- Message from Josh Emery MD sent at 7/13/2023  9:58 AM CDT -----  Regarding: RE: UC results  Put him on cipro 500 po bid for 4 weeks.  Repeat UA/UC in six weeks.      ----- Message -----  From: Josefa Patrick RN  Sent: 7/13/2023   9:42 AM CDT  To: Josh Emery MD  Subject: FW: UC results                                   UC results are back and PCP put him on ABX, any new orders?  ----- Message -----  From: Josefa Patrick RN  Sent: 7/11/2023   8:32 AM CDT  To:  Urology Patient Care Pool  Subject: UC results                                       Await UC results from 7/8/23 and notify Yuly        
Writer called and spoke with patient. Pt has a CIPRO allergy.    He is currently on Cefdinir, wondering if he can continue this for 4 weeks.  Routing to Dr. Emery    Pt uses [facility] in [place].    Milady HORN RN Urology 7/13/2023 1:38 PM        
Name band;

## 2023-08-01 ENCOUNTER — APPOINTMENT (OUTPATIENT)
Dept: TRAUMA SURGERY | Facility: HOSPITAL | Age: 88
End: 2023-08-01

## 2023-08-08 NOTE — ED ADULT NURSE NOTE - PRIMARY CARE PROVIDER
Unk
Pt presenting with chronic hoarse voice and inability to tolerate PO intake. Follows up with ENT. No SOB/CP. No drooling airway maintained.

## 2023-08-20 NOTE — ASSESSMENT
[FreeTextEntry1] : This is an 86-year-old female with history of colon cancer almost 20 years ago status post right hemicolectomy presented to the hospital with constipation.  CT with sigmoid stricture.  Flexible sigmoidoscopy with benign sigmoid stricture biopsies without evidence of malignancy.  I recommend CT colonography to complete evaluation of the colon.  She has an appointment with colorectal surgery on Monday for possible sigmoid resection.  An alternative would be sigmoid stricture dilation.  I explained to the patient and the daughter that with this alternative there is risk for bleeding and perforation.  In the meantime while awaiting results of CT colonography and consultation with colorectal surgery, she is to take MiraLAX on a nightly basis and increase her water intake to avoid constipation.  She is to call me if she has any questions or concerns. strength: grossly 5/5 flexion-extension b/l LE and UE

## 2024-07-09 ENCOUNTER — INPATIENT (INPATIENT)
Facility: HOSPITAL | Age: 89
LOS: 1 days | Discharge: HOME | End: 2024-07-11
Attending: STUDENT IN AN ORGANIZED HEALTH CARE EDUCATION/TRAINING PROGRAM | Admitting: STUDENT IN AN ORGANIZED HEALTH CARE EDUCATION/TRAINING PROGRAM
Payer: MEDICARE

## 2024-07-09 VITALS
RESPIRATION RATE: 18 BRPM | HEART RATE: 95 BPM | DIASTOLIC BLOOD PRESSURE: 72 MMHG | OXYGEN SATURATION: 96 % | TEMPERATURE: 99 F | WEIGHT: 158.07 LBS | SYSTOLIC BLOOD PRESSURE: 160 MMHG

## 2024-07-09 DIAGNOSIS — K56.609 UNSPECIFIED INTESTINAL OBSTRUCTION, UNSPECIFIED AS TO PARTIAL VERSUS COMPLETE OBSTRUCTION: ICD-10-CM

## 2024-07-09 DIAGNOSIS — Z90.49 ACQUIRED ABSENCE OF OTHER SPECIFIED PARTS OF DIGESTIVE TRACT: Chronic | ICD-10-CM

## 2024-07-09 LAB
ALBUMIN SERPL ELPH-MCNC: 4 G/DL — SIGNIFICANT CHANGE UP (ref 3.3–5)
ALP SERPL-CCNC: 57 U/L — SIGNIFICANT CHANGE UP (ref 40–120)
ALT FLD-CCNC: 8 U/L — SIGNIFICANT CHANGE UP (ref 4–33)
ANION GAP SERPL CALC-SCNC: 12 MMOL/L — SIGNIFICANT CHANGE UP (ref 7–14)
APPEARANCE UR: CLEAR — SIGNIFICANT CHANGE UP
APTT BLD: 28.5 SEC — SIGNIFICANT CHANGE UP (ref 24.5–35.6)
AST SERPL-CCNC: 15 U/L — SIGNIFICANT CHANGE UP (ref 4–32)
BASE EXCESS BLDV CALC-SCNC: 0.9 MMOL/L — SIGNIFICANT CHANGE UP (ref -2–3)
BASOPHILS # BLD AUTO: 0.02 K/UL — SIGNIFICANT CHANGE UP (ref 0–0.2)
BASOPHILS NFR BLD AUTO: 0.3 % — SIGNIFICANT CHANGE UP (ref 0–2)
BILIRUB SERPL-MCNC: 1.2 MG/DL — SIGNIFICANT CHANGE UP (ref 0.2–1.2)
BILIRUB UR-MCNC: NEGATIVE — SIGNIFICANT CHANGE UP
BLD GP AB SCN SERPL QL: NEGATIVE — SIGNIFICANT CHANGE UP
BLOOD GAS VENOUS COMPREHENSIVE RESULT: SIGNIFICANT CHANGE UP
BUN SERPL-MCNC: 18 MG/DL — SIGNIFICANT CHANGE UP (ref 7–23)
CALCIUM SERPL-MCNC: 10.4 MG/DL — SIGNIFICANT CHANGE UP (ref 8.4–10.5)
CHLORIDE BLDV-SCNC: 107 MMOL/L — SIGNIFICANT CHANGE UP (ref 96–108)
CHLORIDE SERPL-SCNC: 108 MMOL/L — HIGH (ref 98–107)
CO2 BLDV-SCNC: 28 MMOL/L — HIGH (ref 22–26)
CO2 SERPL-SCNC: 22 MMOL/L — SIGNIFICANT CHANGE UP (ref 22–31)
COLOR SPEC: YELLOW — SIGNIFICANT CHANGE UP
CREAT SERPL-MCNC: 1.23 MG/DL — SIGNIFICANT CHANGE UP (ref 0.5–1.3)
DIFF PNL FLD: NEGATIVE — SIGNIFICANT CHANGE UP
EGFR: 43 ML/MIN/1.73M2 — LOW
EOSINOPHIL # BLD AUTO: 0 K/UL — SIGNIFICANT CHANGE UP (ref 0–0.5)
EOSINOPHIL NFR BLD AUTO: 0 % — SIGNIFICANT CHANGE UP (ref 0–6)
GAS PNL BLDV: 138 MMOL/L — SIGNIFICANT CHANGE UP (ref 136–145)
GLUCOSE BLDV-MCNC: 116 MG/DL — HIGH (ref 70–99)
GLUCOSE SERPL-MCNC: 114 MG/DL — HIGH (ref 70–99)
GLUCOSE UR QL: NEGATIVE MG/DL — SIGNIFICANT CHANGE UP
HCO3 BLDV-SCNC: 27 MMOL/L — SIGNIFICANT CHANGE UP (ref 22–29)
HCT VFR BLD CALC: 34.3 % — LOW (ref 34.5–45)
HCT VFR BLDA CALC: 36 % — SIGNIFICANT CHANGE UP (ref 34.5–46.5)
HGB BLD CALC-MCNC: 12 G/DL — SIGNIFICANT CHANGE UP (ref 11.7–16.1)
HGB BLD-MCNC: 11.5 G/DL — SIGNIFICANT CHANGE UP (ref 11.5–15.5)
IANC: 5.64 K/UL — SIGNIFICANT CHANGE UP (ref 1.8–7.4)
IMM GRANULOCYTES NFR BLD AUTO: 0.4 % — SIGNIFICANT CHANGE UP (ref 0–0.9)
INR BLD: 1.09 RATIO — SIGNIFICANT CHANGE UP (ref 0.85–1.18)
KETONES UR-MCNC: NEGATIVE MG/DL — SIGNIFICANT CHANGE UP
LACTATE BLDV-MCNC: 1 MMOL/L — SIGNIFICANT CHANGE UP (ref 0.5–2)
LEUKOCYTE ESTERASE UR-ACNC: NEGATIVE — SIGNIFICANT CHANGE UP
LYMPHOCYTES # BLD AUTO: 0.84 K/UL — LOW (ref 1–3.3)
LYMPHOCYTES # BLD AUTO: 11.8 % — LOW (ref 13–44)
MCHC RBC-ENTMCNC: 29.9 PG — SIGNIFICANT CHANGE UP (ref 27–34)
MCHC RBC-ENTMCNC: 33.5 GM/DL — SIGNIFICANT CHANGE UP (ref 32–36)
MCV RBC AUTO: 89.1 FL — SIGNIFICANT CHANGE UP (ref 80–100)
MONOCYTES # BLD AUTO: 0.57 K/UL — SIGNIFICANT CHANGE UP (ref 0–0.9)
MONOCYTES NFR BLD AUTO: 8 % — SIGNIFICANT CHANGE UP (ref 2–14)
NEUTROPHILS # BLD AUTO: 5.64 K/UL — SIGNIFICANT CHANGE UP (ref 1.8–7.4)
NEUTROPHILS NFR BLD AUTO: 79.5 % — HIGH (ref 43–77)
NITRITE UR-MCNC: NEGATIVE — SIGNIFICANT CHANGE UP
NRBC # BLD: 0 /100 WBCS — SIGNIFICANT CHANGE UP (ref 0–0)
NRBC # FLD: 0 K/UL — SIGNIFICANT CHANGE UP (ref 0–0)
PCO2 BLDV: 46 MMHG — SIGNIFICANT CHANGE UP (ref 39–52)
PH BLDV: 7.37 — SIGNIFICANT CHANGE UP (ref 7.32–7.43)
PH UR: 6.5 — SIGNIFICANT CHANGE UP (ref 5–8)
PLATELET # BLD AUTO: 308 K/UL — SIGNIFICANT CHANGE UP (ref 150–400)
PO2 BLDV: 48 MMHG — HIGH (ref 25–45)
POTASSIUM BLDV-SCNC: 4.1 MMOL/L — SIGNIFICANT CHANGE UP (ref 3.5–5.1)
POTASSIUM SERPL-MCNC: 4.2 MMOL/L — SIGNIFICANT CHANGE UP (ref 3.5–5.3)
POTASSIUM SERPL-SCNC: 4.2 MMOL/L — SIGNIFICANT CHANGE UP (ref 3.5–5.3)
PROT SERPL-MCNC: 6.7 G/DL — SIGNIFICANT CHANGE UP (ref 6–8.3)
PROT UR-MCNC: NEGATIVE MG/DL — SIGNIFICANT CHANGE UP
PROTHROM AB SERPL-ACNC: 12.3 SEC — SIGNIFICANT CHANGE UP (ref 9.5–13)
RBC # BLD: 3.85 M/UL — SIGNIFICANT CHANGE UP (ref 3.8–5.2)
RBC # FLD: 13.6 % — SIGNIFICANT CHANGE UP (ref 10.3–14.5)
RH IG SCN BLD-IMP: POSITIVE — SIGNIFICANT CHANGE UP
SAO2 % BLDV: 79.6 % — SIGNIFICANT CHANGE UP (ref 67–88)
SODIUM SERPL-SCNC: 142 MMOL/L — SIGNIFICANT CHANGE UP (ref 135–145)
SP GR SPEC: 1 — LOW (ref 1–1.03)
UROBILINOGEN FLD QL: 0.2 MG/DL — SIGNIFICANT CHANGE UP (ref 0.2–1)
WBC # BLD: 7.1 K/UL — SIGNIFICANT CHANGE UP (ref 3.8–10.5)
WBC # FLD AUTO: 7.1 K/UL — SIGNIFICANT CHANGE UP (ref 3.8–10.5)

## 2024-07-09 PROCEDURE — 71045 X-RAY EXAM CHEST 1 VIEW: CPT | Mod: 26

## 2024-07-09 PROCEDURE — 74176 CT ABD & PELVIS W/O CONTRAST: CPT | Mod: 26,MC

## 2024-07-09 PROCEDURE — 99285 EMERGENCY DEPT VISIT HI MDM: CPT

## 2024-07-09 PROCEDURE — 99222 1ST HOSP IP/OBS MODERATE 55: CPT | Mod: GC

## 2024-07-09 RX ORDER — ACETAMINOPHEN 325 MG
1000 TABLET ORAL ONCE
Refills: 0 | Status: COMPLETED | OUTPATIENT
Start: 2024-07-09 | End: 2024-07-09

## 2024-07-09 RX ORDER — DEXTROSE MONOHYDRATE AND SODIUM CHLORIDE 5; .3 G/100ML; G/100ML
1000 INJECTION, SOLUTION INTRAVENOUS
Refills: 0 | Status: DISCONTINUED | OUTPATIENT
Start: 2024-07-09 | End: 2024-07-10

## 2024-07-09 RX ORDER — DONEPEZIL HYDROCHLORIDE 10 MG/1
1 TABLET, FILM COATED ORAL
Refills: 0 | DISCHARGE

## 2024-07-09 RX ORDER — ENOXAPARIN SODIUM 100 MG/ML
40 INJECTION SUBCUTANEOUS EVERY 24 HOURS
Refills: 0 | Status: DISCONTINUED | OUTPATIENT
Start: 2024-07-10 | End: 2024-07-11

## 2024-07-09 RX ORDER — AMLODIPINE BESYLATE 2.5 MG/1
1 TABLET ORAL
Refills: 0 | DISCHARGE

## 2024-07-09 RX ADMIN — DEXTROSE MONOHYDRATE AND SODIUM CHLORIDE 100 MILLILITER(S): 5; .3 INJECTION, SOLUTION INTRAVENOUS at 15:56

## 2024-07-09 RX ADMIN — Medication 400 MILLIGRAM(S): at 12:54

## 2024-07-09 RX ADMIN — Medication 1000 MILLIGRAM(S): at 13:24

## 2024-07-09 NOTE — ED ADULT NURSE NOTE - OBJECTIVE STATEMENT
Pt came in complaining of midline abdominal pain and constipation for 3 days. Pt endorses vomiting and nausea. Pt vomiting yesterday said it was yellow in color. Pt rates pain 8 out of 10. Denies any pain medication at this time. Normal bowel movement is 3 times a day. Pm Hx of hypertension, colon cancer. Denies any recent travel, SOB, chest pain, trouble urinating, pain on urination.     Pt A&O x3, neurologically intact, respirations even and unlabored with normal breath sounds, s1 and s2 sounds heard on auscultation with regular rhythm and rate, hyperactive bowel sounds heard on upper quadrant, tender to touch midline and slight abdominal distention noted, pulses in all extremities, skin intact. Labs sent and awaiting results, plan of care educated with patient. Safety maintained. Daughter at bedside. Call bell within reach. Pt came in complaining of midline abdominal pain and constipation for 3 days. Pt endorses vomiting and nausea. Pt vomiting yesterday said it was yellow in color. Pt rates pain 8 out of 10. MD aware. Normal bowel movement is 3 times a day. Pm Hx of hypertension, colon cancer. Denies any recent travel, SOB, chest pain, trouble urinating, pain on urination.     Pt A&O x3, neurologically intact, respirations even and unlabored with normal breath sounds, s1 and s2 sounds heard on auscultation with regular rhythm and rate, hyperactive bowel sounds heard on upper quadrant, tender to touch midline and slight abdominal distention noted, pulses in all extremities, skin intact. Labs sent and awaiting results, plan of care educated with patient. Safety maintained. Daughter at bedside. Call bell within reach. Pt came in complaining of midline abdominal pain and constipation for 3 days. Pt endorses vomiting and nausea. Pt vomiting yesterday said it was yellow in color. Pt rates pain 8 out of 10. MD aware. Normal bowel movement is 3 times a day. Pm Hx of hypertension, colon cancer. Denies any recent travel, passing gas, SOB, chest pain, trouble urinating, pain on urination.     Pt A&O x3, neurologically intact, respirations even and unlabored with normal breath sounds, s1 and s2 sounds heard on auscultation with regular rhythm and rate, hyperactive bowel sounds heard on upper quadrant, tender to touch midline and slight abdominal distention noted, pulses in all extremities, skin intact. Labs sent and awaiting results, plan of care educated with patient. Safety maintained. Daughter at bedside. Call bell within reach.

## 2024-07-09 NOTE — ED PROVIDER NOTE - OBJECTIVE STATEMENT
88-year-old female past medical history of colon CA status post colectomy greater than 20 years ago, cervical CA, hypertension, history of large bowel obstruction, small bowel obstruction 1 year ago presenting with 3 days of constipation.  States she normally has 3 bowel movements a day, however has not been able to move her bowels in 3 days.  Decreased p.o. intake, started vomiting last night.  No fevers or dysuria.

## 2024-07-09 NOTE — PATIENT PROFILE ADULT - NSPROGENPREVTRANSF_GEN_A_NUR
What Type Of Note Output Would You Prefer (Optional)?: Bullet Format How Severe Is Your Rash?: severe Is This A New Presentation, Or A Follow-Up?: Rash no history of blood product transfusion

## 2024-07-09 NOTE — H&P ADULT - HISTORY OF PRESENT ILLNESS
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 Reports n/v began last night where she has been unable to keep anything down. Otherwise, patient denies subjective fever/chills, CP, or SOB. -Flatus/-BM.     In the ED patient is HDS, labs unremarkable, and CT+pPartial small bowel obstruction with transition point in the left lower.

## 2024-07-09 NOTE — H&P ADULT - ASSESSMENT
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 CT+ partial SBO with TP in LLQ.     Plan:  - Admit to B Team surg (Dr. Black)  - NPO/IVF/NGT  - F/u XR for NGT placement   - GG challenge tomorrow  - Monitor bowel fxn and abd exam  - DVT PPX  - Plan discussed with attending      B Team Surgery [id number]  Toby Palma MD (PGY2)

## 2024-07-09 NOTE — H&P ADULT - ATTENDING COMMENTS
Patient with partial sbo based on clinical exam, labs, and imaging. CT scan showing partial sbo no signs of intestinal ischemia  plan  npo, ivf, ngt  gastrografin challenge    I have personally interviewed and examined this patient, reviewed pertinent labs and imaging, and discussed the case with colleagues, residents, and physician assistants on B Team rounds.    The active care issues are:  1. partial sbo    The Acute Care Surgery (B Team) Attending Group Practice:  Dr. Quyen Torre, Dr. Sony Dunne, Dr. Spencer Cornejo, Dr. Alex Black,     urgent issues - spectra [phone]  nonurgent issues - [phone]  patient appointments or afterhours - [phone]

## 2024-07-09 NOTE — ED PROVIDER NOTE - ATTENDING APP SHARED VISIT CONTRIBUTION OF CARE
DR. HARRELL, ATTENDING MD-  I personally saw the patient with the PA and performed a substantive portion of the visit including all aspects of the medical decision making.    87 y/o female h/o htn lbo sbo with n/v constipation x2 days.  Eval for lbo sbo constipation colitis.  Obtain cbc cmp ct a/p give ivf bolus antiemetic reassess.

## 2024-07-09 NOTE — PATIENT PROFILE ADULT - FALL HARM RISK - HARM RISK INTERVENTIONS

## 2024-07-09 NOTE — ED PROVIDER NOTE - WR ORDER ID 1
Post-partum Note,   She is a  28y woman who is now post-partum day: 1    Subjective:  The patient feels well.  She is ambulating.   She is tolerating regular diet.  She denies nausea and vomiting; denies fever.  She is voiding.  Her pain is controlled.  She reports normal postpartum bleeding.  She is breastfeeding.  She is bonding with baby.   She feels ready to go home.     Physical exam:    Vital Signs Last 24 Hrs  T(C): 36.8 (15 Nov 2022 10:00), Max: 36.9 (15 Nov 2022 01:31)  T(F): 98.2 (15 Nov 2022 10:00), Max: 98.5 (15 Nov 2022 01:31)  HR: 79 (15 Nov 2022 06:30) (67 - 103)  BP: 114/63 (15 Nov 2022 06:30) (107/64 - 131/81)  BP(mean): --  RR: 18 (15 Nov 2022 10:00) (16 - 18)  SpO2: 100% (15 Nov 2022 10:00) (85% - 100%)    Parameters below as of 15 Nov 2022 10:00  Patient On (Oxygen Delivery Method): room air        Gen: NAD  Breast: Soft, nontender, not engorged.  Abdomen: Soft, nontender, no distension , firm uterine fundus at umbilicus.  Pelvic: Normal lochia noted  Ext: No calf tenderness    LABS:                        10.4   15.75 )-----------( 169      ( 15 Nov 2022 05:55 )             31.2       Rubella status:     Allergies    No Known Allergies    Intolerances      MEDICATIONS  (STANDING):  acetaminophen     Tablet .. 975 milliGRAM(s) Oral <User Schedule>  calcium carbonate    500 mG (Tums) Chewable 1 Tablet(s) Chew every 8 hours  diphtheria/tetanus/pertussis (acellular) Vaccine (Adacel) 0.5 milliLiter(s) IntraMuscular once  ibuprofen  Tablet. 600 milliGRAM(s) Oral every 6 hours  prenatal multivitamin 1 Tablet(s) Oral daily  sodium chloride 0.9% lock flush 3 milliLiter(s) IV Push every 8 hours    MEDICATIONS  (PRN):  benzocaine 20%/menthol 0.5% Spray 1 Spray(s) Topical every 6 hours PRN for Perineal discomfort  dibucaine 1% Ointment 1 Application(s) Topical every 6 hours PRN Perineal discomfort  diphenhydrAMINE 25 milliGRAM(s) Oral every 6 hours PRN Pruritus  hydrocortisone 1% Cream 1 Application(s) Topical every 6 hours PRN Moderate Pain (4-6)  lanolin Ointment 1 Application(s) Topical every 6 hours PRN nipple soreness  magnesium hydroxide Suspension 30 milliLiter(s) Oral two times a day PRN Constipation  oxyCODONE    IR 5 milliGRAM(s) Oral every 3 hours PRN Moderate to Severe Pain (4-10)  oxyCODONE    IR 5 milliGRAM(s) Oral once PRN Moderate to Severe Pain (4-10)  pramoxine 1%/zinc 5% Cream 1 Application(s) Topical every 4 hours PRN Moderate Pain (4-6)  simethicone 80 milliGRAM(s) Chew every 4 hours PRN Gas  witch hazel Pads 1 Application(s) Topical every 4 hours PRN Perineal discomfort        Assessment and Plan  PPD #1 s/p . +Gproteinuria.   Doing well.  Encourage ambulation.  PP & PPD Instructions reviewed.  PP education materials provided  Pt has a BP cuff at home.   Advised to cont to monitor BPs x 6 weeks bid.   BP parameters reviewed. Reviewed when to notify office/ when to return to hospital.   D/C home today.   Follow up with [facility] in 2 weeks via telehealth to review blood pressure log. Advised to follow up sooner if any elevations.   Plan reviewed with Dr. Betancourt.          [id number]

## 2024-07-09 NOTE — H&P ADULT - NSHPPHYSICALEXAM_GEN_ALL_CORE
General Appearance: no acute distress, NTND   Chest: airway intact, non-labored breathing  CV: no JVD, palpable pulses b/l  Abdomen: softly distended, mild TTP, hyperactive BS   Extremities: WWP

## 2024-07-09 NOTE — ED PROVIDER NOTE - CLINICAL SUMMARY MEDICAL DECISION MAKING FREE TEXT BOX
88-year-old female past medical history of colon CA status post colectomy greater than 20 years ago, cervical CA, hypertension, history of large bowel obstruction, small bowel obstruction 1 year ago presenting with 3 days of constipation.  States she normally has 3 bowel movements a day, however has not been able to move her bowels in 3 days.  Decreased p.o. intake, started vomiting last night.  No fevers or dysuria.  On exam patient does have hyperactive bowel sounds in the right upper quadrant and left upper quadrant, and hypoactive bowel sounds in the bilateral lower quadrants.  No abdominal tenderness.  Concern for recurrent SBO as no stool obstructing on rectal exam.  Will obtain labs, CT abdomen reassess.

## 2024-07-09 NOTE — H&P ADULT - NSHPLABSRESULTS_GEN_ALL_CORE
CT of the Abdomen and Pelvis was performed.  Sagittal and coronal reformats were performed.    FINDINGS:  LOWER CHEST: Within normal limits.    LIVER: Within normal limits.  BILE DUCTS: Postcholecystectomy biliary ductal dilatation.  GALLBLADDER: Surgically removed  SPLEEN: Within normal limits.  PANCREAS: Within normal limits.  ADRENALS: Within normal limits.  KIDNEYS/URETERS: Left renal cyst measuring 2.7 cm in the upper pole and   2.3 cm in the lower pole.    BLADDER: Within normal limits.  REPRODUCTIVE ORGANS: Calcified uterine myoma.    BOWEL: Right colectomy with ileocolic anastomosis. Redemonstrated dilated   small bowel loops in mid abdomen with fecalization of the intraluminal   contents and focal transition point in the left lower quadrant (602:46).   Findings are consistent with partial small bowel obstruction with   improvement of bowel distention and mesenteric edema as compared to prior   examination 7/8/2023. Previously noted narrowing of the sigmoid colon is   less prominent on the current exam. Uncomplicated diverticulosis.  PERITONEUM/RETROPERITONEUM: Within normal limits.  VESSELS: Atherosclerotic calcifications.  LYMPH NODES: No lymphadenopathy.  ABDOMINAL WALL: Postoperative changes. Fat-containing right inguinal   hernia. The left adnexa is contained within a left inguinal hernia.  BONES: Degenerative changes.    IMPRESSION:  Partial small bowel obstruction with transition point in the left lower   quadrant, improved compared to prior examination 7/8/2023.

## 2024-07-10 LAB
ANION GAP SERPL CALC-SCNC: 11 MMOL/L — SIGNIFICANT CHANGE UP (ref 7–14)
BUN SERPL-MCNC: 17 MG/DL — SIGNIFICANT CHANGE UP (ref 7–23)
CALCIUM SERPL-MCNC: 10.4 MG/DL — SIGNIFICANT CHANGE UP (ref 8.4–10.5)
CHLORIDE SERPL-SCNC: 107 MMOL/L — SIGNIFICANT CHANGE UP (ref 98–107)
CO2 SERPL-SCNC: 25 MMOL/L — SIGNIFICANT CHANGE UP (ref 22–31)
CREAT SERPL-MCNC: 1.15 MG/DL — SIGNIFICANT CHANGE UP (ref 0.5–1.3)
CULTURE RESULTS: SIGNIFICANT CHANGE UP
EGFR: 46 ML/MIN/1.73M2 — LOW
GLUCOSE SERPL-MCNC: 87 MG/DL — SIGNIFICANT CHANGE UP (ref 70–99)
HCT VFR BLD CALC: 32.5 % — LOW (ref 34.5–45)
HGB BLD-MCNC: 11 G/DL — LOW (ref 11.5–15.5)
MAGNESIUM SERPL-MCNC: 2 MG/DL — SIGNIFICANT CHANGE UP (ref 1.6–2.6)
MCHC RBC-ENTMCNC: 30.4 PG — SIGNIFICANT CHANGE UP (ref 27–34)
MCHC RBC-ENTMCNC: 33.8 GM/DL — SIGNIFICANT CHANGE UP (ref 32–36)
MCV RBC AUTO: 89.8 FL — SIGNIFICANT CHANGE UP (ref 80–100)
NRBC # BLD: 0 /100 WBCS — SIGNIFICANT CHANGE UP (ref 0–0)
NRBC # FLD: 0 K/UL — SIGNIFICANT CHANGE UP (ref 0–0)
PHOSPHATE SERPL-MCNC: 2.8 MG/DL — SIGNIFICANT CHANGE UP (ref 2.5–4.5)
PLATELET # BLD AUTO: 292 K/UL — SIGNIFICANT CHANGE UP (ref 150–400)
POTASSIUM SERPL-MCNC: 4.4 MMOL/L — SIGNIFICANT CHANGE UP (ref 3.5–5.3)
POTASSIUM SERPL-SCNC: 4.4 MMOL/L — SIGNIFICANT CHANGE UP (ref 3.5–5.3)
RBC # BLD: 3.62 M/UL — LOW (ref 3.8–5.2)
RBC # FLD: 13.7 % — SIGNIFICANT CHANGE UP (ref 10.3–14.5)
SODIUM SERPL-SCNC: 143 MMOL/L — SIGNIFICANT CHANGE UP (ref 135–145)
SPECIMEN SOURCE: SIGNIFICANT CHANGE UP
WBC # BLD: 6.15 K/UL — SIGNIFICANT CHANGE UP (ref 3.8–10.5)
WBC # FLD AUTO: 6.15 K/UL — SIGNIFICANT CHANGE UP (ref 3.8–10.5)

## 2024-07-10 PROCEDURE — 93010 ELECTROCARDIOGRAM REPORT: CPT

## 2024-07-10 PROCEDURE — 74018 RADEX ABDOMEN 1 VIEW: CPT | Mod: 26

## 2024-07-10 PROCEDURE — 99232 SBSQ HOSP IP/OBS MODERATE 35: CPT | Mod: GC

## 2024-07-10 RX ORDER — POTASSIUM PHOSPHATE, MONOBASIC POTASSIUM PHOSPHATE, DIBASIC INJECTION, 236; 224 MG/ML; MG/ML
15 SOLUTION, CONCENTRATE INTRAVENOUS ONCE
Refills: 0 | Status: COMPLETED | OUTPATIENT
Start: 2024-07-10 | End: 2024-07-10

## 2024-07-10 RX ORDER — MELOXICAM 7.5 MG/1
1 TABLET ORAL
Refills: 0 | DISCHARGE

## 2024-07-10 RX ORDER — ACETAMINOPHEN 325 MG
1000 TABLET ORAL EVERY 6 HOURS
Refills: 0 | Status: COMPLETED | OUTPATIENT
Start: 2024-07-10 | End: 2024-07-10

## 2024-07-10 RX ORDER — HYDRALAZINE HYDROCHLORIDE 50 MG/1
5 TABLET ORAL ONCE
Refills: 0 | Status: COMPLETED | OUTPATIENT
Start: 2024-07-10 | End: 2024-07-10

## 2024-07-10 RX ORDER — ACETAMINOPHEN 325 MG
1000 TABLET ORAL ONCE
Refills: 0 | Status: DISCONTINUED | OUTPATIENT
Start: 2024-07-10 | End: 2024-07-10

## 2024-07-10 RX ORDER — HYDRALAZINE HYDROCHLORIDE 50 MG/1
10 TABLET ORAL ONCE
Refills: 0 | Status: COMPLETED | OUTPATIENT
Start: 2024-07-10 | End: 2024-07-10

## 2024-07-10 RX ORDER — AMLODIPINE BESYLATE 2.5 MG/1
5 TABLET ORAL DAILY
Refills: 0 | Status: DISCONTINUED | OUTPATIENT
Start: 2024-07-10 | End: 2024-07-11

## 2024-07-10 RX ORDER — DEXTROSE MONOHYDRATE AND SODIUM CHLORIDE 5; .3 G/100ML; G/100ML
1000 INJECTION, SOLUTION INTRAVENOUS
Refills: 0 | Status: DISCONTINUED | OUTPATIENT
Start: 2024-07-10 | End: 2024-07-11

## 2024-07-10 RX ORDER — PANTOPRAZOLE SODIUM 40 MG/10ML
40 INJECTION, POWDER, FOR SOLUTION INTRAVENOUS
Refills: 0 | Status: DISCONTINUED | OUTPATIENT
Start: 2024-07-10 | End: 2024-07-11

## 2024-07-10 RX ORDER — DIATRIZOATE MEGLUMINE 60 %
100 VIAL (ML) INJECTION ONCE
Refills: 0 | Status: DISCONTINUED | OUTPATIENT
Start: 2024-07-10 | End: 2024-07-10

## 2024-07-10 RX ORDER — TRAZODONE HYDROCHLORIDE 50 MG/1
50 TABLET, FILM COATED ORAL
Refills: 0 | DISCHARGE

## 2024-07-10 RX ADMIN — POTASSIUM PHOSPHATE, MONOBASIC POTASSIUM PHOSPHATE, DIBASIC INJECTION, 62.5 MILLIMOLE(S): 236; 224 SOLUTION, CONCENTRATE INTRAVENOUS at 11:50

## 2024-07-10 RX ADMIN — HYDRALAZINE HYDROCHLORIDE 10 MILLIGRAM(S): 50 TABLET ORAL at 11:46

## 2024-07-10 RX ADMIN — DEXTROSE MONOHYDRATE AND SODIUM CHLORIDE 50 MILLILITER(S): 5; .3 INJECTION, SOLUTION INTRAVENOUS at 18:13

## 2024-07-10 RX ADMIN — Medication 400 MILLIGRAM(S): at 23:42

## 2024-07-10 RX ADMIN — Medication 400 MILLIGRAM(S): at 18:12

## 2024-07-10 RX ADMIN — HYDRALAZINE HYDROCHLORIDE 5 MILLIGRAM(S): 50 TABLET ORAL at 00:50

## 2024-07-10 RX ADMIN — Medication 400 MILLIGRAM(S): at 01:53

## 2024-07-10 RX ADMIN — Medication 1000 MILLIGRAM(S): at 18:42

## 2024-07-10 RX ADMIN — Medication 1000 MILLIGRAM(S): at 02:23

## 2024-07-10 RX ADMIN — Medication 400 MILLIGRAM(S): at 07:19

## 2024-07-10 NOTE — PROGRESS NOTE ADULT - ATTENDING COMMENTS
Mrs. Tucker is admitted with an adhesive SBO  -gastrograffin challenge and f.u xrays  -NPO/IVF  -monitor labs  -home meds  -DVT prophylaxis       Spencer Cornejo MD  Acute and Critical Care Surgery    The Acute Care Surgery (B Team) Attending Group Practice:  Dr. Quyen Torre, Dr. Sony Dunne, Dr. Spencer Cornejo,  Dr. Alex Black and Dr. Mirtha Garcia     Urgent issues - spectra [phone] or [phone]  Nonurgent issues - [phone]  Patient appointments or after hours - [phone]

## 2024-07-10 NOTE — PROVIDER CONTACT NOTE (OTHER) - BACKGROUND
Patient used to be on metformin 500 mg daily. We discontinued to try if she is okay with just diet. A1c is 6.7. Lipid panel, on file. She is up-to-date on monofilament. She is ace, aspirin, statin. No complications. I advise to restart metformin    SBO, NGT lws intermittent suction

## 2024-07-10 NOTE — CHART NOTE - NSCHARTNOTEFT_GEN_A_CORE
Team was paged for evaluation of tachycardia. Patient was tachycardic to 107; remaining vital signs were stable.     Patient states that she has abdominal pain and has had multiple bowel movements while on gastrografin challenge. She states that she needed to go to the bathroom multiple times. She states that she can feel heart palpitations in the context of the abdominal pain and pressure she feels to go to the bathroom. She stated she had 3 bowel movements precipitously.     Patients tachycardia was non-sustaining and resolved without intervention. An EKG was performed for symptomatic palpitations which did not show any ST elevations consistent with acute MI and was normal sinus rhythm on evaluation (EKG in patient's chart). Patient's tachycardia is likely 2/2 abdominal pain and pressure from gastrografin challenge.     -Will continue to monitor vitals now since completing gastrografin challenge.  -Repeat EKG as needed if tachycardia persists or patient becomes symptomatic     Marta Robledo PGY1 #[id number]

## 2024-07-11 ENCOUNTER — TRANSCRIPTION ENCOUNTER (OUTPATIENT)
Age: 89
End: 2024-07-11

## 2024-07-11 VITALS
HEART RATE: 66 BPM | OXYGEN SATURATION: 99 % | DIASTOLIC BLOOD PRESSURE: 66 MMHG | TEMPERATURE: 98 F | RESPIRATION RATE: 18 BRPM | SYSTOLIC BLOOD PRESSURE: 165 MMHG

## 2024-07-11 LAB
ANION GAP SERPL CALC-SCNC: 8 MMOL/L — SIGNIFICANT CHANGE UP (ref 7–14)
BUN SERPL-MCNC: 15 MG/DL — SIGNIFICANT CHANGE UP (ref 7–23)
CALCIUM SERPL-MCNC: 9.9 MG/DL — SIGNIFICANT CHANGE UP (ref 8.4–10.5)
CHLORIDE SERPL-SCNC: 108 MMOL/L — HIGH (ref 98–107)
CO2 SERPL-SCNC: 26 MMOL/L — SIGNIFICANT CHANGE UP (ref 22–31)
CREAT SERPL-MCNC: 1.21 MG/DL — SIGNIFICANT CHANGE UP (ref 0.5–1.3)
EGFR: 43 ML/MIN/1.73M2 — LOW
GLUCOSE SERPL-MCNC: 89 MG/DL — SIGNIFICANT CHANGE UP (ref 70–99)
HCT VFR BLD CALC: 31.5 % — LOW (ref 34.5–45)
HGB BLD-MCNC: 10.8 G/DL — LOW (ref 11.5–15.5)
MAGNESIUM SERPL-MCNC: 1.9 MG/DL — SIGNIFICANT CHANGE UP (ref 1.6–2.6)
MCHC RBC-ENTMCNC: 30.9 PG — SIGNIFICANT CHANGE UP (ref 27–34)
MCHC RBC-ENTMCNC: 34.3 GM/DL — SIGNIFICANT CHANGE UP (ref 32–36)
MCV RBC AUTO: 90 FL — SIGNIFICANT CHANGE UP (ref 80–100)
NRBC # BLD: 0 /100 WBCS — SIGNIFICANT CHANGE UP (ref 0–0)
NRBC # FLD: 0 K/UL — SIGNIFICANT CHANGE UP (ref 0–0)
PHOSPHATE SERPL-MCNC: 3.2 MG/DL — SIGNIFICANT CHANGE UP (ref 2.5–4.5)
PLATELET # BLD AUTO: 288 K/UL — SIGNIFICANT CHANGE UP (ref 150–400)
POTASSIUM SERPL-MCNC: 4.2 MMOL/L — SIGNIFICANT CHANGE UP (ref 3.5–5.3)
POTASSIUM SERPL-SCNC: 4.2 MMOL/L — SIGNIFICANT CHANGE UP (ref 3.5–5.3)
RBC # BLD: 3.5 M/UL — LOW (ref 3.8–5.2)
RBC # FLD: 13.9 % — SIGNIFICANT CHANGE UP (ref 10.3–14.5)
SODIUM SERPL-SCNC: 142 MMOL/L — SIGNIFICANT CHANGE UP (ref 135–145)
WBC # BLD: 5.79 K/UL — SIGNIFICANT CHANGE UP (ref 3.8–10.5)
WBC # FLD AUTO: 5.79 K/UL — SIGNIFICANT CHANGE UP (ref 3.8–10.5)

## 2024-07-11 RX ORDER — SENNOSIDES 8.6 MG
1 TABLET ORAL DAILY
Refills: 0 | Status: DISCONTINUED | OUTPATIENT
Start: 2024-07-11 | End: 2024-07-11

## 2024-07-11 RX ORDER — POLYETHYLENE GLYCOL 3350 1 G/G
17 POWDER ORAL DAILY
Refills: 0 | Status: DISCONTINUED | OUTPATIENT
Start: 2024-07-11 | End: 2024-07-11

## 2024-07-11 RX ORDER — DONEPEZIL HYDROCHLORIDE 10 MG/1
10 TABLET, FILM COATED ORAL AT BEDTIME
Refills: 0 | Status: DISCONTINUED | OUTPATIENT
Start: 2024-07-11 | End: 2024-07-11

## 2024-07-11 RX ADMIN — AMLODIPINE BESYLATE 5 MILLIGRAM(S): 2.5 TABLET ORAL at 05:59

## 2024-07-11 RX ADMIN — PANTOPRAZOLE SODIUM 40 MILLIGRAM(S): 40 INJECTION, POWDER, FOR SOLUTION INTRAVENOUS at 05:59

## 2024-07-11 RX ADMIN — ENOXAPARIN SODIUM 40 MILLIGRAM(S): 100 INJECTION SUBCUTANEOUS at 11:13

## 2024-07-11 NOTE — DISCHARGE NOTE NURSING/CASE MANAGEMENT/SOCIAL WORK - PATIENT PORTAL LINK FT
You can access the FollowMyHealth Patient Portal offered by [facility] by registering at the following website: [email or web address]. By joining [employer]’s FollowMyHealth portal, you will also be able to view your health information using other applications (apps) compatible with our system.

## 2024-07-11 NOTE — PROGRESS NOTE ADULT - ASSESSMENT
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 CT+ partial SBO with TP in LLQ. She had a GG challenge which she passed with multiple bowel movements and return of flatus by 3 hours of GG challenge. 4 hour abdominal Xray demonstrated GG contrast in cecum. Patient NGT had been removed and patient transition to clears yesterday which she tolerated well.     Plan:  - Transition to regular diet from Clears  - If patient tolerates breakfast and lunch plan for DC to home pending PT eval and social support   - Monitor bowel fxn and abd exam  - DVT PPX  - Home mediations resumed patient hypertensive episodes have resolved.     Plan discussed with chief resident and attending on service.     Marta Robledo PGY1 #[id number]
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 CT+ partial SBO with TP in LLQ. Patient reports feeling better this AM with improved abdominal pain. She had a hypertensive episode overnight which she received hydralazine for.     Plan:  - NPO/IVF/NGT  - F/u XR for NGT placement   - GG today  - Monitor bowel fxn and abd exam  - DVT PPX  - Medication Rec needed given hypertensive episode. Can give 10mg of hydralazine if SBP >180.     Plan discussed with senior resident and attending on service.     Marta Robledo PGY1 #[id number]

## 2024-07-11 NOTE — DISCHARGE NOTE PROVIDER - NSDCFUADDAPPT_GEN_ALL_CORE_FT
Please call [phone] to schedule an appointment with Dr Black within 1-2 weeks of discharge for outpatient follow up.    Please call your primary care provider to schedule an appointment within 1-2 weeks of discharge to discuss your recent hospitalization.

## 2024-07-11 NOTE — DISCHARGE NOTE PROVIDER - CARE PROVIDER_API CALL
Alex Black [facility], [facility]  Surgery  [id number] [place] [id number]  Phone: [phone]  Fax: [phone]  Follow Up Time: 2 weeks

## 2024-07-11 NOTE — DISCHARGE NOTE PROVIDER - CARE PROVIDERS DIRECT ADDRESSES
[email or web address]

## 2024-07-11 NOTE — DISCHARGE NOTE PROVIDER - NSDCCPCAREPLAN_GEN_ALL_CORE_FT
PRINCIPAL DISCHARGE DIAGNOSIS  Diagnosis: SBO (small bowel obstruction)  Assessment and Plan of Treatment: Please return to ER/call your surgeon if you have symptoms of bowel obstruction. Symptoms include inability to pass gas/stool, abdominal bloating/increased pain, nausea/vomiting.

## 2024-07-11 NOTE — PROGRESS NOTE ADULT - SUBJECTIVE AND OBJECTIVE BOX
Morning Surgical Progress Note  Patient is a 88y old  Female who presents with a chief complaint of SBO (10 Jul 2024 13:12)    SUBJECTIVE: Patient reports feeling well after having multiple bowel movement yesterday following her GG challenge. She states she has had multiple bowel movements and has passed flatus without nausea or vomiting.     Vital Signs Last 24 Hrs  T(C): 36.7 (11 Jul 2024 05:57), Max: 36.7 (10 Jul 2024 17:24)  T(F): 98.1 (11 Jul 2024 05:57), Max: 98.1 (10 Jul 2024 17:24)  HR: 73 (11 Jul 2024 05:57) (62 - 107)  BP: 178/63 (11 Jul 2024 07:02) (148/66 - 193/89)  BP(mean): --  RR: 17 (11 Jul 2024 05:57) (16 - 18)  SpO2: 98% (11 Jul 2024 05:57) (98% - 99%)    Parameters below as of 11 Jul 2024 05:57  Patient On (Oxygen Delivery Method): room air    I&O's Detail    10 Jul 2024 07:01  -  11 Jul 2024 07:00  --------------------------------------------------------  IN:    dextrose 5% + lactated ringers: 600 mL    IV PiggyBack: 100 mL    Lactated Ringers: 400 mL    Oral Fluid: 480 mL  Total IN: 1580 mL    OUT:    Nasogastric/Oral tube (mL): 0 mL    Voided (mL): 400 mL  Total OUT: 400 mL    Total NET: 1180 mL    Medications  MEDICATIONS  (STANDING):  amLODIPine   Tablet 5 milliGRAM(s) Oral daily  donepezil 10 milliGRAM(s) Oral at bedtime  enoxaparin Injectable 40 milliGRAM(s) SubCutaneous every 24 hours  pantoprazole    Tablet 40 milliGRAM(s) Oral before breakfast  polyethylene glycol 3350 17 Gram(s) Oral daily  senna 1 Tablet(s) Oral daily    MEDICATIONS  (PRN):    Physical Exam  Constitutional: A&Ox3, NAD  Gastrointestinal: Soft nontender, nondistended  Extremities: Moving all extremities, no edema    LABS:                        10.8   5.79  )-----------( 288      ( 11 Jul 2024 06:00 )             31.5     07-11    142  |  108<H>  |  15  ----------------------------<  89  4.2   |  26  |  1.21    Ca    9.9      11 Jul 2024 06:00  Phos  3.2     07-11  Mg     1.90     07-11    TPro  6.7  /  Alb  4.0  /  TBili  1.2  /  DBili  x   /  AST  15  /  ALT  8   /  AlkPhos  57  07-09    PT/INR - ( 09 Jul 2024 12:21 )   PT: 12.3 sec;   INR: 1.09 ratio         PTT - ( 09 Jul 2024 12:21 )  PTT:28.5 sec  LIVER FUNCTIONS - ( 09 Jul 2024 12:21 )  Alb: 4.0 g/dL / Pro: 6.7 g/dL / ALK PHOS: 57 U/L / ALT: 8 U/L / AST: 15 U/L / GGT: x           Urinalysis Basic - ( 11 Jul 2024 06:00 )    Color: x / Appearance: x / SG: x / pH: x  Gluc: 89 mg/dL / Ketone: x  / Bili: x / Urobili: x   Blood: x / Protein: x / Nitrite: x   Leuk Esterase: x / RBC: x / WBC x   Sq Epi: x / Non Sq Epi: x / Bacteria: x      
Morning Surgical Progress Note  Patient is a 88y old  Female who presents with a chief complaint of SBO (09 Jul 2024 15:18)    Overnight Events:   Patient was hypertensive and received hydralazine for a hypertensive episode >180.    SUBJECTIVE: Patient seen and examined at bedside with surgical team, patient reports feeling slightly better. She states she has not passed gas yet and has not had a bowel movement. She denies any nausea or vomiting.     Vital Signs Last 24 Hrs  T(C): 36.6 (10 Jul 2024 12:55), Max: 36.8 (09 Jul 2024 21:30)  T(F): 97.8 (10 Jul 2024 12:55), Max: 98.2 (09 Jul 2024 21:30)  HR: 107 (10 Jul 2024 12:55) (55 - 107)  BP: 153/67 (10 Jul 2024 12:55) (153/67 - 193/89)  BP(mean): --  RR: 18 (10 Jul 2024 12:55) (17 - 18)  SpO2: 98% (10 Jul 2024 12:55) (97% - 100%)    Parameters below as of 10 Jul 2024 12:55  Patient On (Oxygen Delivery Method): room air    I&O's Detail    09 Jul 2024 07:01  -  10 Jul 2024 07:00  --------------------------------------------------------  IN:    Lactated Ringers: 1600 mL  Total IN: 1600 mL    OUT:    Drain (mL): 100 mL    IV PiggyBack: 0 mL    Nasogastric/Oral tube (mL): 120 mL    Oral Fluid: 0 mL    Voided (mL): 1000 mL  Total OUT: 1220 mL    Total NET: 380 mL      10 Jul 2024 07:01  -  10 Jul 2024 13:13  --------------------------------------------------------  IN:    Lactated Ringers: 400 mL  Total IN: 400 mL    OUT:    Nasogastric/Oral tube (mL): 0 mL    Oral Fluid: 0 mL  Total OUT: 0 mL    Total NET: 400 mL        Medications  MEDICATIONS  (STANDING):  acetaminophen   IVPB .. 1000 milliGRAM(s) IV Intermittent every 6 hours  diatrizoate meglumine/diatrizoate sodium. 100 milliLiter(s) Oral once  enoxaparin Injectable 40 milliGRAM(s) SubCutaneous every 24 hours  lactated ringers. 1000 milliLiter(s) (100 mL/Hr) IV Continuous <Continuous>    MEDICATIONS  (PRN):    Physical Exam  Constitutional: A&Ox3, NAD  Gastrointestinal: Soft nontender, nondistended  Extremities: Moving all extremities, no edema  Skin: No Rashes, Hematoma, Ecchymosis  LABS:                        11.0   6.15  )-----------( 292      ( 10 Jul 2024 06:25 )             32.5     07-10    143  |  107  |  17  ----------------------------<  87  4.4   |  25  |  1.15    Ca    10.4      10 Jul 2024 06:25  Phos  2.8     07-10  Mg     2.00     07-10    TPro  6.7  /  Alb  4.0  /  TBili  1.2  /  DBili  x   /  AST  15  /  ALT  8   /  AlkPhos  57  07-09    PT/INR - ( 09 Jul 2024 12:21 )   PT: 12.3 sec;   INR: 1.09 ratio         PTT - ( 09 Jul 2024 12:21 )  PTT:28.5 sec  LIVER FUNCTIONS - ( 09 Jul 2024 12:21 )  Alb: 4.0 g/dL / Pro: 6.7 g/dL / ALK PHOS: 57 U/L / ALT: 8 U/L / AST: 15 U/L / GGT: x           Urinalysis Basic - ( 10 Jul 2024 06:25 )    Color: x / Appearance: x / SG: x / pH: x  Gluc: 87 mg/dL / Ketone: x  / Bili: x / Urobili: x   Blood: x / Protein: x / Nitrite: x   Leuk Esterase: x / RBC: x / WBC x   Sq Epi: x / Non Sq Epi: x / Bacteria: x

## 2024-07-11 NOTE — DISCHARGE NOTE NURSING/CASE MANAGEMENT/SOCIAL WORK - NSDCFUADDAPPT_GEN_ALL_CORE_FT
Please call [phone] to schedule an appointment with Dr Black within 1-2 weeks of discharge for outpatient follow up.    Please call your primary care provider to schedule an appointment within 1-2 weeks of discharge to discuss your recent hospitalization.

## 2024-07-11 NOTE — DISCHARGE NOTE PROVIDER - NSDCMRMEDTOKEN_GEN_ALL_CORE_FT
amLODIPine 5 mg oral tablet: 1 tab(s) orally once a day  donepezil 10 mg oral tablet: 1 tab(s) orally once a day  ibuprofen 600 mg oral tablet: 1 tab(s) orally 3 times a day as needed for  moderate pain  meloxicam 7.5 mg oral tablet: 1 tab(s) orally once a day as needed for  moderate pain  pantoprazole 40 mg oral delayed release tablet: 1 tab(s) orally once a day  polyethylene glycol 3350 oral powder for reconstitution: 17 gram(s) orally 1 to 2 times a day  senna (sennosides) 8.6 mg oral tablet: 2 tab(s) orally once a day (at bedtime)  traZODone: 50 milligram(s) orally once a day

## 2024-07-11 NOTE — DISCHARGE NOTE PROVIDER - HOSPITAL COURSE
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 CT in ER showed evidence of partial SBO with TP in LLQ. Patient was admitted to B team surgery, made NPO, started on IV fluids, and NGT placed to [facility]. 7/10 patient underwent Gastrografin challenge, follow up xray with evidence of contrast progression to colon. Patient with return of bowel function, + flatus / + BM. Subsequently NGT was removed, diet was slowly advanced as tolerated to CLD and regular. Patient tolerated diet well without nausea or vomiting. Patient with history of hypertension, on Amlodipine at home, treated with Hydralazine IVP prn while NPO, restarted PO Amlodipine when diet was resumed.     At the time of discharge, the patient was hemodynamically stable, was tolerating PO diet, was voiding urine and passing stool. Adequate pain control.  The patient was instructed to follow up with Dr. Black within 1-2 weeks after discharge from the hospital.  The patient felt comfortable with discharge.  The patient had no other issues.    Per attending, patient deemed medically stable and ready for discharge at this time.

## 2024-08-21 NOTE — ED ADULT NURSE NOTE - NS ED NURSE REPORT GIVEN DT
Problem: Skin/Tissue Integrity  Goal: Absence of new skin breakdown  Description: 1.  Monitor for areas of redness and/or skin breakdown  2.  Assess vascular access sites hourly  3.  Every 4-6 hours minimum:  Change oxygen saturation probe site  4.  Every 4-6 hours:  If on nasal continuous positive airway pressure, respiratory therapy assess nares and determine need for appliance change or resting period.  8/20/2024 2109 by Dorothy Wesley RN  Outcome: Progressing  8/20/2024 1251 by Clementina Hackett RN  Outcome: Progressing     Problem: ABCDS Injury Assessment  Goal: Absence of physical injury  8/20/2024 2109 by Dorothy Wesley RN  Outcome: Progressing  8/20/2024 1251 by Clementina Hackett RN  Outcome: Progressing     Problem: Safety - Adult  Goal: Free from fall injury  8/20/2024 2109 by Dorothy Wesley RN  Outcome: Progressing  8/20/2024 1251 by Clementina Hackett RN  Outcome: Progressing     Problem: Musculoskeletal - Adult  Goal: Return mobility to safest level of function  8/20/2024 2109 by Dorothy Wesley RN  Outcome: Progressing  8/20/2024 1251 by Clementina Hackett RN  Outcome: Progressing  Goal: Maintain proper alignment of affected body part  8/20/2024 2109 by Dorothy Wesley RN  Outcome: Progressing  8/20/2024 1251 by Clementina Hackett RN  Outcome: Progressing  Goal: Return ADL status to a safe level of function  8/20/2024 2109 by Dorothy Wesley RN  Outcome: Progressing  8/20/2024 1251 by Clementina Hackett RN  Outcome: Progressing     Problem: Pain  Goal: Verbalizes/displays adequate comfort level or baseline comfort level  8/20/2024 2109 by Dorothy Wesley RN  Outcome: Progressing  8/20/2024 1251 by Clementina Hackett RN  Outcome: Progressing      09-Jul-2024 17:07

## 2025-02-12 ENCOUNTER — EMERGENCY (EMERGENCY)
Facility: HOSPITAL | Age: 89
LOS: 1 days | Discharge: HOME | End: 2025-02-12
Attending: STUDENT IN AN ORGANIZED HEALTH CARE EDUCATION/TRAINING PROGRAM | Admitting: STUDENT IN AN ORGANIZED HEALTH CARE EDUCATION/TRAINING PROGRAM
Payer: MEDICARE

## 2025-02-12 VITALS
SYSTOLIC BLOOD PRESSURE: 147 MMHG | OXYGEN SATURATION: 99 % | HEART RATE: 71 BPM | TEMPERATURE: 97 F | DIASTOLIC BLOOD PRESSURE: 71 MMHG | RESPIRATION RATE: 18 BRPM

## 2025-02-12 VITALS
WEIGHT: 160.06 LBS | TEMPERATURE: 97 F | DIASTOLIC BLOOD PRESSURE: 56 MMHG | HEART RATE: 66 BPM | OXYGEN SATURATION: 94 % | SYSTOLIC BLOOD PRESSURE: 146 MMHG | RESPIRATION RATE: 18 BRPM

## 2025-02-12 DIAGNOSIS — Z90.49 ACQUIRED ABSENCE OF OTHER SPECIFIED PARTS OF DIGESTIVE TRACT: Chronic | ICD-10-CM

## 2025-02-12 LAB
ALBUMIN SERPL ELPH-MCNC: 4.1 G/DL — SIGNIFICANT CHANGE UP (ref 3.3–5)
ALP SERPL-CCNC: 59 U/L — SIGNIFICANT CHANGE UP (ref 40–120)
ALT FLD-CCNC: 12 U/L — SIGNIFICANT CHANGE UP (ref 4–33)
ANION GAP SERPL CALC-SCNC: 11 MMOL/L — SIGNIFICANT CHANGE UP (ref 7–14)
APTT BLD: 29.6 SEC — SIGNIFICANT CHANGE UP (ref 24.5–35.6)
AST SERPL-CCNC: 21 U/L — SIGNIFICANT CHANGE UP (ref 4–32)
BASOPHILS # BLD AUTO: 0.05 K/UL — SIGNIFICANT CHANGE UP (ref 0–0.2)
BASOPHILS NFR BLD AUTO: 0.5 % — SIGNIFICANT CHANGE UP (ref 0–2)
BILIRUB SERPL-MCNC: 0.6 MG/DL — SIGNIFICANT CHANGE UP (ref 0.2–1.2)
BUN SERPL-MCNC: 13 MG/DL — SIGNIFICANT CHANGE UP (ref 7–23)
CALCIUM SERPL-MCNC: 10.1 MG/DL — SIGNIFICANT CHANGE UP (ref 8.4–10.5)
CHLORIDE SERPL-SCNC: 108 MMOL/L — HIGH (ref 98–107)
CO2 SERPL-SCNC: 20 MMOL/L — LOW (ref 22–31)
CREAT SERPL-MCNC: 1.15 MG/DL — SIGNIFICANT CHANGE UP (ref 0.5–1.3)
EGFR: 46 ML/MIN/1.73M2 — LOW
EGFR: 46 ML/MIN/1.73M2 — LOW
EOSINOPHIL # BLD AUTO: 0.02 K/UL — SIGNIFICANT CHANGE UP (ref 0–0.5)
EOSINOPHIL NFR BLD AUTO: 0.2 % — SIGNIFICANT CHANGE UP (ref 0–6)
GLUCOSE SERPL-MCNC: 108 MG/DL — HIGH (ref 70–99)
HCT VFR BLD CALC: 33.7 % — LOW (ref 34.5–45)
HGB BLD-MCNC: 11.3 G/DL — LOW (ref 11.5–15.5)
IANC: 8.38 K/UL — HIGH (ref 1.8–7.4)
IMM GRANULOCYTES NFR BLD AUTO: 0.3 % — SIGNIFICANT CHANGE UP (ref 0–0.9)
INR BLD: 1.03 RATIO — SIGNIFICANT CHANGE UP (ref 0.85–1.16)
LYMPHOCYTES # BLD AUTO: 1.01 K/UL — SIGNIFICANT CHANGE UP (ref 1–3.3)
LYMPHOCYTES # BLD AUTO: 10 % — LOW (ref 13–44)
MCHC RBC-ENTMCNC: 30 PG — SIGNIFICANT CHANGE UP (ref 27–34)
MCHC RBC-ENTMCNC: 33.5 G/DL — SIGNIFICANT CHANGE UP (ref 32–36)
MCV RBC AUTO: 89.4 FL — SIGNIFICANT CHANGE UP (ref 80–100)
MONOCYTES # BLD AUTO: 0.56 K/UL — SIGNIFICANT CHANGE UP (ref 0–0.9)
MONOCYTES NFR BLD AUTO: 5.6 % — SIGNIFICANT CHANGE UP (ref 2–14)
NEUTROPHILS # BLD AUTO: 8.38 K/UL — HIGH (ref 1.8–7.4)
NEUTROPHILS NFR BLD AUTO: 83.4 % — HIGH (ref 43–77)
NRBC # BLD AUTO: 0 K/UL — SIGNIFICANT CHANGE UP (ref 0–0)
NRBC # FLD: 0 K/UL — SIGNIFICANT CHANGE UP (ref 0–0)
NRBC BLD AUTO-RTO: 0 /100 WBCS — SIGNIFICANT CHANGE UP (ref 0–0)
PLATELET # BLD AUTO: 333 K/UL — SIGNIFICANT CHANGE UP (ref 150–400)
POTASSIUM SERPL-MCNC: 4.2 MMOL/L — SIGNIFICANT CHANGE UP (ref 3.5–5.3)
POTASSIUM SERPL-SCNC: 4.2 MMOL/L — SIGNIFICANT CHANGE UP (ref 3.5–5.3)
PROT SERPL-MCNC: 7 G/DL — SIGNIFICANT CHANGE UP (ref 6–8.3)
PROTHROM AB SERPL-ACNC: 11.9 SEC — SIGNIFICANT CHANGE UP (ref 9.9–13.4)
RBC # BLD: 3.77 M/UL — LOW (ref 3.8–5.2)
RBC # FLD: 14.1 % — SIGNIFICANT CHANGE UP (ref 10.3–14.5)
SODIUM SERPL-SCNC: 139 MMOL/L — SIGNIFICANT CHANGE UP (ref 135–145)
WBC # BLD: 10.05 K/UL — SIGNIFICANT CHANGE UP (ref 3.8–10.5)
WBC # FLD AUTO: 10.05 K/UL — SIGNIFICANT CHANGE UP (ref 3.8–10.5)

## 2025-02-12 PROCEDURE — 70450 CT HEAD/BRAIN W/O DYE: CPT | Mod: 26

## 2025-02-12 PROCEDURE — 72170 X-RAY EXAM OF PELVIS: CPT | Mod: 26

## 2025-02-12 PROCEDURE — 99285 EMERGENCY DEPT VISIT HI MDM: CPT | Mod: 25

## 2025-02-12 PROCEDURE — 74177 CT ABD & PELVIS W/CONTRAST: CPT | Mod: 26

## 2025-02-12 PROCEDURE — 71046 X-RAY EXAM CHEST 2 VIEWS: CPT | Mod: 26

## 2025-02-12 PROCEDURE — 72125 CT NECK SPINE W/O DYE: CPT | Mod: 26

## 2025-02-12 PROCEDURE — 93010 ELECTROCARDIOGRAM REPORT: CPT

## 2025-02-12 PROCEDURE — 12011 RPR F/E/E/N/L/M 2.5 CM/<: CPT

## 2025-02-12 PROCEDURE — 71260 CT THORAX DX C+: CPT | Mod: 26

## 2025-07-02 NOTE — PHYSICAL THERAPY INITIAL EVALUATION ADULT - LEVEL OF INDEPENDENCE: SIT/STAND, REHAB EVAL
[FreeTextEntry1] : This note was written by Zofia Kong on 07/02/2025 acting solely as a scribe for Dr. Raphael Sanchez.    All medical record entries made by the Scribe were at my, Dr. Raphael Sanchez, direction and personally dictated by me on 07/02/2025. I have personally reviewed the chart and agree that the record accurately reflects my personal performance of the history, physical exam, assessment and plan.  contact guard

## (undated) DEVICE — ELCTR ECG CONDUCTIVE ADHESIVE

## (undated) DEVICE — TUBING CAP SET ENDO 24HR USE GI

## (undated) DEVICE — SOL INJ NS 0.9% 500ML 2 PORT

## (undated) DEVICE — TUBING SUCTION CONN 6FT STERILE

## (undated) DEVICE — TUBING IV SET GRAVITY 3Y 100" MACRO

## (undated) DEVICE — TUBING SUCTION 20FT

## (undated) DEVICE — SENSOR O2 FINGER ADULT

## (undated) DEVICE — SYR ALLIANCE II INFLATION 60ML

## (undated) DEVICE — FACESHIELD FULL VISOR

## (undated) DEVICE — CONTAINER FORMALIN 80ML YELLOW

## (undated) DEVICE — BIOPSY FORCEP RADIAL JAW 4 STANDARD WITH NEEDLE

## (undated) DEVICE — BIOPSY FORCEP COLD DISP

## (undated) DEVICE — PACK IV START WITH CHG

## (undated) DEVICE — TUBING SUCTION NONCONDUCTIVE 6MM X 12FT

## (undated) DEVICE — GOWN LG

## (undated) DEVICE — ELCTR GROUNDING PAD ADULT COVIDIEN

## (undated) DEVICE — CATH IV SAFE BC 22G X 1" (BLUE)

## (undated) DEVICE — TUBING MEDI-VAC W MAXIGRIP CONNECTORS 1/4"X6'

## (undated) DEVICE — BASIN EMESIS 10IN GRADUATED MAUVE

## (undated) DEVICE — DRSG BANDAID 0.75X3"

## (undated) DEVICE — DRSG 2X2

## (undated) DEVICE — CATH IV SAFE BC 20G X 1.16" (PINK)

## (undated) DEVICE — DRSG CURITY GAUZE SPONGE 4 X 4" 12-PLY NON-STERILE

## (undated) DEVICE — FORCEP RADIAL JAW 4 PEDIATRIC W NDL 1.8MM 2MM 160CM DISP

## (undated) DEVICE — FOLEY HOLDER STATLOCK 2 WAY ADULT

## (undated) DEVICE — LINE BREATHE SAMPLNG

## (undated) DEVICE — Device

## (undated) DEVICE — LUBRICATING JELLY HR ONE SHOT 3G

## (undated) DEVICE — SUCTION YANKAUER NO CONTROL VENT

## (undated) DEVICE — SALIVA EJECTOR (BLUE)